# Patient Record
Sex: FEMALE | Race: BLACK OR AFRICAN AMERICAN | Employment: OTHER | URBAN - METROPOLITAN AREA
[De-identification: names, ages, dates, MRNs, and addresses within clinical notes are randomized per-mention and may not be internally consistent; named-entity substitution may affect disease eponyms.]

---

## 2017-01-05 ENCOUNTER — OFFICE VISIT (OUTPATIENT)
Dept: INTERNAL MEDICINE CLINIC | Age: 67
End: 2017-01-05

## 2017-01-05 VITALS
OXYGEN SATURATION: 99 % | TEMPERATURE: 97.7 F | DIASTOLIC BLOOD PRESSURE: 80 MMHG | HEART RATE: 80 BPM | BODY MASS INDEX: 26.01 KG/M2 | RESPIRATION RATE: 18 BRPM | WEIGHT: 175.6 LBS | HEIGHT: 69 IN | SYSTOLIC BLOOD PRESSURE: 132 MMHG

## 2017-01-05 DIAGNOSIS — R06.83 SNORING: ICD-10-CM

## 2017-01-05 DIAGNOSIS — D57.3 SICKLE CELL TRAIT (HCC): ICD-10-CM

## 2017-01-05 DIAGNOSIS — E55.9 VITAMIN D DEFICIENCY: ICD-10-CM

## 2017-01-05 DIAGNOSIS — E78.5 DYSLIPIDEMIA: Primary | ICD-10-CM

## 2017-01-05 NOTE — PROGRESS NOTES
Chief Complaint   Patient presents with    Cholesterol Problem    Vitamin D Deficiency       HPI:     Danay Palacios is a 78 y.o.  female with history of dyslipidemia and vitamin D deficiency  here for the above complaint. She denies any chest pain, abdominal pain, dizziness, shortness of breath. She has headaches, snores at night, but does not know if have apneic spells. Past Medical History   Diagnosis Date    Advance directive discussed with patient 09/06/2016    Dyslipidemia     H/O colonoscopy 08/12/14     Done by Dr. Gandhi Scales: diverticular dz & hemorrhoids, repeat in 10 years    Microcalcifications of the breast 2013     left breast s/p Vacuum-assisted core needle biopsy of the left breast with placement of localizing clip  6/18/2013. Being followed by  Gianni Bon Aqua. Suppose to have mammoram in 6 months. Path showed firbrocystic changes    Sickle cell trait ([facility] [address]     Vitamin D deficiency      Past Surgical History   Procedure Laterality Date    Hx tonsil and adenoidectomy  1962    Pr knee scope,med or lat menis repair  2006     right knee    Hx hysterectomy  1994     unsure of exact date [id number], partial hysterectomy. kept both ovaries    Harriet biopsy breast stereotactic Left 2013     benign    Hx breast biopsy Bilateral 1994     b[facility]     Current Outpatient Prescriptions   Medication Sig    atorvastatin (LIPITOR) 10 mg tablet TAKE 1 TABLET BY MOUTH DAILY    cholecalciferol, vitamin D3, (VITAMIN D3) 2,000 unit tab Take 2,000 Int'l Units by mouth daily.  omega-3 fatty acids-vitamin e (FISH OIL) 1,000 mg cap Take 2 Caps by mouth daily.  iron 50 mg iron Tab Take  by mouth daily. Take one po daily    acetaminophen (TYLENOL ARTHRITIS PAIN) 650 mg CR tablet Take 650 mg by mouth every six (6) hours as needed. No current facility-administered medications for this visit.       Health Maintenance   Topic Date Due    MEDICARE YEARLY EXAM 09/07/2017    GLAUCOMA SCREENING Q2Y  01/15/2018    Pneumococcal 65+ Low/Medium Risk (2 of 2 - PPSV23) 06/10/2018    BREAST CANCER SCRN MAMMOGRAM  07/25/2018    COLONOSCOPY  08/12/2024    DTaP/Tdap/Td series (2 - Td) 03/12/2026    Hepatitis C Screening  Completed    OSTEOPOROSIS SCREENING (DEXA)  Completed    ZOSTER VACCINE AGE 60>  Completed    INFLUENZA AGE 9 TO ADULT  Addressed     Immunization History   Administered Date(s) Administered    Influenza High Dose Vaccine PF 09/27/2015    Pneumococcal Conjugate (PCV-13) 04/25/2016    Pneumococcal Vaccine (Unspecified Type) 06/10/2013    Tdap 03/12/2016     No LMP recorded. Patient has had a hysterectomy. Allergies and Intolerances:   No Known Allergies    Family History:   Family History   Problem Relation Age of Onset    Hypertension Mother     Diabetes Mother     Hypertension Father     Hypertension Maternal Grandmother     Hypertension Maternal Grandfather        Social History:   She  reports that she has never smoked. She has never used smokeless tobacco.  She  reports that she drinks alcohol.               ROS:  · General: negative for - chills, fever, weight changes or malaise, night sweats  · HEENT: no sore throat, nasal congestion,  vision problems or ear problems  · Respiratory: no cough, shortness of breath, or wheezing  · Cardiovascular: no chest pain, palpitations, or dyspnea on exertion; no orthopnea or PND  · Gastrointestinal: no abdominal pain, N/V, change in bowel habits,  black or bloody stools, constipation or diarrhea  · Musculoskeletal: no back pain, joint pain, joint stiffness, muscle pain or muscle weakness  · Neurological: no numbness, tingling, headache or dizziness  · Psychological: negative for - anxiety, depression, sleep disturbances, suicidal or homicidal ideations    OBJECTIVE:   Physical exam:   Visit Vitals    /80 (BP 1 Location: Left arm, BP Patient Position: Sitting)    Pulse 80    Temp 97.7 °F (36.5 °C) (Oral)    Resp 18    Ht 5' 9\" (1.753 m)    Wt 175 lb 9.6 oz (79.7 kg)    SpO2 99%    BMI 25.93 kg/m2        Generally: Pleasant female in no acute distress  HEENT Exam: Head: atraumatic, acephalic                Eyes: PERRLA     Ears: bilaterally normal TM, no erythema or exudate, normal light reflex    Nares: mucosal membranes moist, no erythema    Mouth: Clear, no erythema or exudate    Neck: supple, no LAD    Cardiac Exam: regular, rate, and rhythm. Normal S1 and S2. No murmurs, gallops, or rubs  Pulmonary exam: Clear to auscultation bilaterally  Abdominal exam: Positive bowel sounds in all four quadrants, soft, nondistended, nontender  Extremities: 2+ dorsalis pedis pulses bilaterally. No pedal edema    bilaterally    LABS/RADIOLOGICAL TESTS:  Lab Results   Component Value Date/Time    WBC 5.3 06/17/2015 09:49 AM    HGB 13.3 06/17/2015 09:49 AM    HCT 39.6 06/17/2015 09:49 AM    PLATELET 572 12/85/1884 09:49 AM     Lab Results   Component Value Date/Time    Sodium 140 09/06/2016 10:06 AM    Potassium 3.9 09/06/2016 10:06 AM    Chloride 104 09/06/2016 10:06 AM    CO2 29 09/06/2016 10:06 AM    Glucose 80 09/06/2016 10:06 AM    BUN 16 09/06/2016 10:06 AM    Creatinine 0.62 09/06/2016 10:06 AM     Lab Results   Component Value Date/Time    Cholesterol, total 238 09/06/2016 10:06 AM    HDL Cholesterol 133 09/06/2016 10:06 AM    LDL, calculated 85.6 09/06/2016 10:06 AM    Triglyceride 97 09/06/2016 10:06 AM     No results found for: GPT    Previous labs    ASSESSMENT/PLAN:    1. Dyslipidemia: we will see what the labs show. Continue diet, exercise, and lipitor.   -     METABOLIC PANEL, COMPREHENSIVE; Future  -     LIPID PANEL; Future    2. Sickle cell trait ([facility] [address]): stable. -     CBC W/O DIFF; Future    3. Vitamin D deficiency: stable. Continue the vitamin D.   -     VITAMIN D, 25 HYDROXY; Future    4. Snoring  -     REFERRAL TO SLEEP STUDIES      5.  Patient verbalized understanding and agreement with the plan.    6. Patient was given an after-visit summary. 7. Follow-up Disposition:  Return in about 4 months (around 5/5/2017) for f/u lipids. or sooner if worsening symptoms.           Christine Calvo MD

## 2017-01-05 NOTE — PROGRESS NOTES
ROOM # 1    Pt presents today for 3 month follow up. Pt preferred language for health care discussion is english. Is someone accompanying this pt? no    Is the patient using any DME equipment during OV? no    Depression Screening completed. Yes    Learning Assessment completed. Yes    Abuse Screening completed. Yes    Health Maintenance reviewed and discussed per provider. Yes, up to date    Advance Directive:  1. Do you have an advance directive in place? Patient Reply: No    2. If not, would you like material regarding how to put one in place? Patient Reply: No    Coordination of Care:  1. Have you been to the ER, urgent care clinic since your last visit? Hospitalized since your last visit? No    2. Have you seen or consulted any other health care providers outside of the [address] [place] [address] since your last visit? Include any pap smears or colon screening.  No

## 2017-01-05 NOTE — MR AVS SNAPSHOT
Visit Information Date & Time Provider Department Dept. Phone Encounter #  
 1/5/2017  9:30 AM Wolf Gordon MD [phone] [id number] Follow-up Instructions Return in about 4 months (around 5/5/2017) for f/u lipids. Upcoming Health Maintenance Date Due  
 MEDICARE YEARLY EXAM 9/7/2017 GLAUCOMA SCREENING Q2Y 1/15/2018 Pneumococcal 65+ Low/Medium Risk (2 of 2 - PPSV23) 6/10/2018 BREAST CANCER SCRN MAMMOGRAM 7/25/2018 COLONOSCOPY 8/12/2024 DTaP/Tdap/Td series (2 - Td) 3/12/2026 Allergies as of 1/5/2017  Review Complete On: 1/5/2017 By: Wolf Gordon MD  
 No Known Allergies Current Immunizations  Reviewed on 9/6/2016 Name Date Influenza High Dose Vaccine PF 9/27/2015 Pneumococcal Conjugate (PCV-13) 4/25/2016 Pneumococcal Vaccine (Unspecified Type) 6/10/2013 Tdap 3/12/2016 11:04 AM  
  
 Not reviewed this visit You Were Diagnosed With   
  
 Codes Comments Dyslipidemia    -  Primary ICD-10-CM: E78.5 ICD-9-CM: 272.4 [facility]     ICD-10-CM: D57.3 ICD-9-CM: 282.5 Vitamin D deficiency     ICD-10-CM: E55.9 ICD-9-CM: 268.9 Vitals BP Pulse Temp Resp Height(growth percentile) Weight(growth percentile) 132/80 (BP 1 Location: Left arm, BP Patient Position: Sitting) 80 97.7 °F (36.5 °C) (Oral) 18 5' 9\" (1.753 m) 175 lb 9.6 oz (79.7 kg) SpO2 BMI OB Status Smoking Status 99% 25.93 kg/m2 Hysterectomy Never Smoker Vitals History BMI and BSA Data Body Mass Index Body Surface Area  
 25.93 kg/m 2 1.97 m 2 Preferred Pharmacy Pharmacy Name Phone [facility] [address] [place] [address] [facility] Susan Perdomo Your Updated Medication List  
  
   
This list is accurate as of: 1/5/17 10:04 AM.  Always use your most recent med list.  
  
  
  
  
 atorvastatin 10 mg tablet Commonly known as:  LIPITOR  
TAKE 1 TABLET BY MOUTH DAILY FISH OIL 1,000 mg Cap Generic drug:  omega-3 fatty acids-vitamin e Take 2 Caps by mouth daily. iron 50 mg iron Tab Take  by mouth daily. Take one po daily TYLENOL ARTHRITIS PAIN 650 mg CR tablet Generic drug:  acetaminophen Take 650 mg by mouth every six (6) hours as needed. VITAMIN D3 2,000 unit Tab Generic drug:  cholecalciferol (vitamin D3) Take 2,000 Int'l Units by mouth daily. Follow-up Instructions Return in about 4 months (around 5/5/2017) for f/u lipids. To-Do List   
 01/05/2017 Lab:  CBC W/O DIFF   
  
 01/05/2017 Lab:  LIPID PANEL   
  
 01/05/2017 Lab:  METABOLIC PANEL, COMPREHENSIVE   
  
 01/05/2017 Lab:  VITAMIN D, 25 HYDROXY Patient Instructions 1) follow-up in 4 months or sooner if worsening symptoms. Introducing [facility]! Marquez Martini introduces [employer] patient portal. Now you can access parts of your medical record, email your doctor's office, and request medication refills online. 1. In your internet browser, go to [email or web address] 2. Click on the First Time User? Click Here link in the Sign In box. You will see the New Member Sign Up page. 3. Enter your EUDOWEB Access Code exactly as it appears below. You will not need to use this code after youve completed the sign-up process. If you do not sign up before the expiration date, you must request a new code. · E[employer] Access Code: [email or web address] Expires: 4/5/2017 10:03 AM 
 
4. Enter the last four digits of your Social Security Number (xxxx) and Date of Birth (mm/dd/yyyy) as indicated and click Submit. You will be taken to the next sign-up page. 5. Create a EUDOWEB ID. This will be your EUDOWEB login ID and cannot be changed, so think of one that is secure and easy to remember. 6. Create a EUDOWEB password. You can change your password at any time. 7. Enter your Password Reset Question and Answer. This can be used at a later time if you forget your password. 8. Enter your e-mail address. You will receive e-mail notification when new information is available in [address] 9. Click Sign Up. You can now view and download portions of your medical record. 10. Click the Download Summary menu link to download a portable copy of your medical information. If you have questions, please visit the Frequently Asked Questions section of the [employer] website. Remember, [employer] is NOT to be used for urgent needs. For medical emergencies, dial 911. Now available from your iPhone and Android! Please provide this summary of care documentation to your next provider. Your primary care clinician is listed as Lolly Bruno. If you have any questions after today's visit, please call [phone].

## 2017-01-06 ENCOUNTER — HOSPITAL ENCOUNTER (OUTPATIENT)
Dept: LAB | Age: 67
Discharge: HOME | End: 2017-01-06
Payer: MEDICARE

## 2017-01-06 LAB
ALBUMIN SERPL BCP-MCNC: 3.9 G/DL (ref 3.4–5)
ALBUMIN/GLOB SERPL: 1.4 {RATIO} (ref 0.8–1.7)
ALP SERPL-CCNC: 108 U/L (ref 45–117)
ALT SERPL-CCNC: 29 U/L (ref 13–56)
ANION GAP BLD CALC-SCNC: 6 MMOL/L (ref 3–18)
AST SERPL W P-5'-P-CCNC: 19 U/L (ref 15–37)
BILIRUB SERPL-MCNC: 0.7 MG/DL (ref 0.2–1)
BUN SERPL-MCNC: 13 MG/DL (ref 7–18)
BUN/CREAT SERPL: 25 (ref 12–20)
CALCIUM SERPL-MCNC: 8.8 MG/DL (ref 8.5–10.1)
CHLORIDE SERPL-SCNC: 105 MMOL/L (ref 100–108)
CHOLEST SERPL-MCNC: 209 MG/DL
CO2 SERPL-SCNC: 30 MMOL/L (ref 21–32)
CREAT SERPL-MCNC: 0.53 MG/DL (ref 0.6–1.3)
ERYTHROCYTE [DISTWIDTH] IN BLOOD BY AUTOMATED COUNT: 14.3 % (ref 11.6–14.5)
GLOBULIN SER CALC-MCNC: 2.8 G/DL (ref 2–4)
GLUCOSE SERPL-MCNC: 70 MG/DL (ref 74–99)
HCT VFR BLD AUTO: 37.2 % (ref 35–45)
HDLC SERPL-MCNC: 119 MG/DL (ref 40–60)
HDLC SERPL: 1.8 {RATIO} (ref 0–5)
HGB BLD-MCNC: 12.7 G/DL (ref 12–16)
LDLC SERPL CALC-MCNC: 81.6 MG/DL (ref 0–100)
LIPID PROFILE,FLP: ABNORMAL
MCH RBC QN AUTO: 30 PG (ref 24–34)
MCHC RBC AUTO-ENTMCNC: 34.1 G/DL (ref 31–37)
MCV RBC AUTO: 87.9 FL (ref 74–97)
PLATELET # BLD AUTO: 280 K/UL (ref 135–420)
PMV BLD AUTO: 10.6 FL (ref 9.2–11.8)
POTASSIUM SERPL-SCNC: 3.7 MMOL/L (ref 3.5–5.5)
PROT SERPL-MCNC: 6.7 G/DL (ref 6.4–8.2)
RBC # BLD AUTO: 4.23 M/UL (ref 4.2–5.3)
SODIUM SERPL-SCNC: 141 MMOL/L (ref 136–145)
TRIGL SERPL-MCNC: 42 MG/DL (ref ?–150)
VLDLC SERPL CALC-MCNC: 8.4 MG/DL
WBC # BLD AUTO: 5.3 K/UL (ref 4.6–13.2)

## 2017-01-06 PROCEDURE — 82306 VITAMIN D 25 HYDROXY: CPT | Performed by: INTERNAL MEDICINE

## 2017-01-06 PROCEDURE — 80053 COMPREHEN METABOLIC PANEL: CPT | Performed by: INTERNAL MEDICINE

## 2017-01-06 PROCEDURE — 36415 COLL VENOUS BLD VENIPUNCTURE: CPT | Performed by: INTERNAL MEDICINE

## 2017-01-06 PROCEDURE — 80061 LIPID PANEL: CPT | Performed by: INTERNAL MEDICINE

## 2017-01-06 PROCEDURE — 85027 COMPLETE CBC AUTOMATED: CPT | Performed by: INTERNAL MEDICINE

## 2017-01-09 ENCOUNTER — TELEPHONE (OUTPATIENT)
Dept: INTERNAL MEDICINE CLINIC | Age: 67
End: 2017-01-09

## 2017-01-09 NOTE — TELEPHONE ENCOUNTER
Contact patient, informed patient of result note below, patient verbalized understanding and agrees with plan. Patient states that she try not to miss any doses of Lipitor,  patient states that every now and then she misses a dose, patient states that with in the past week she has only missed one dose of Lipitor.

## 2017-01-09 NOTE — TELEPHONE ENCOUNTER
----- Message from Jessie Aguillon MD sent at 1/9/2017 11:59 AM EST -----  Please let pt know that labs were normal except:    1) lipids still little up. Has she missed any dosages of her lipitor? 2) work on diet and exercise. 3) vitamin D pending. Will notify her of results.

## 2017-01-09 NOTE — PROGRESS NOTES
Please let pt know that labs were normal except:    1) lipids still little up. Has she missed any dosages of her lipitor? 2) work on diet and exercise. 3) vitamin D pending. Will notify her of results.

## 2017-01-11 LAB — 25(OH)D3 SERPL-MCNC: 35.7 NG/ML (ref 30–100)

## 2017-01-11 NOTE — TELEPHONE ENCOUNTER
Pt contacted at home number. 2 pt identifiers confirmed. Pt informed of below. Pt verbalized understanding. No other questions at this time.

## 2017-01-11 NOTE — PROGRESS NOTES
Result letter generated for the vitamin D results. She has also been notified of her other results through phone call. Letter will be mailed to pt.

## 2017-03-02 ENCOUNTER — TELEPHONE (OUTPATIENT)
Dept: SLEEP MEDICINE | Age: 67
End: 2017-03-02

## 2017-03-02 ENCOUNTER — HOSPITAL ENCOUNTER (OUTPATIENT)
Dept: SLEEP MEDICINE | Age: 67
Discharge: HOME | End: 2017-03-02
Payer: MEDICARE

## 2017-03-02 DIAGNOSIS — G47.00 INSOMNIA: ICD-10-CM

## 2017-03-02 DIAGNOSIS — G47.33 OSA (OBSTRUCTIVE SLEEP APNEA): ICD-10-CM

## 2017-03-02 DIAGNOSIS — R06.83 SNORING: ICD-10-CM

## 2017-03-02 PROCEDURE — 95810 POLYSOM 6/> YRS 4/> PARAM: CPT

## 2017-03-03 ENCOUNTER — DOCUMENTATION ONLY (OUTPATIENT)
Dept: SLEEP MEDICINE | Age: 67
End: 2017-03-03

## 2017-03-14 DIAGNOSIS — E78.5 DYSLIPIDEMIA: ICD-10-CM

## 2017-03-14 RX ORDER — ATORVASTATIN CALCIUM 10 MG/1
TABLET, FILM COATED ORAL
Qty: 90 TAB | Refills: 3 | Status: SHIPPED | OUTPATIENT
Start: 2017-03-14 | End: 2018-03-10 | Stop reason: SDUPTHER

## 2017-03-14 NOTE — TELEPHONE ENCOUNTER
Requested Prescriptions     Pending Prescriptions Disp Refills    atorvastatin (LIPITOR) 10 mg tablet 90 Tab 3     Sig: TAKE 1 TABLET BY MOUTH DAILY

## 2017-03-15 PROBLEM — G47.33 OSA (OBSTRUCTIVE SLEEP APNEA): Status: ACTIVE | Noted: 2017-03-02

## 2017-03-16 ENCOUNTER — HOSPITAL ENCOUNTER (OUTPATIENT)
Dept: SLEEP MEDICINE | Age: 67
Discharge: HOME | End: 2017-03-16
Payer: MEDICARE

## 2017-03-16 ENCOUNTER — TELEPHONE (OUTPATIENT)
Dept: SLEEP MEDICINE | Age: 67
End: 2017-03-16

## 2017-03-16 DIAGNOSIS — G47.33 OSA (OBSTRUCTIVE SLEEP APNEA): ICD-10-CM

## 2017-03-16 PROCEDURE — 95811 POLYSOM 6/>YRS CPAP 4/> PARM: CPT

## 2017-03-17 ENCOUNTER — DOCUMENTATION ONLY (OUTPATIENT)
Dept: SLEEP MEDICINE | Age: 67
End: 2017-03-17

## 2017-03-17 NOTE — PROGRESS NOTES
Sleep study scored per the American Academy of Sleep Medicine (AASM) Manual for the Scoring of Sleep and Associated Events, Rules, Terminology and Technical Specifications Visual and Cardiac Rules, 2007. V2.2    Preliminary titration sleep study report scanned in to patient's chart.

## 2017-05-09 ENCOUNTER — OFFICE VISIT (OUTPATIENT)
Dept: INTERNAL MEDICINE CLINIC | Age: 67
End: 2017-05-09

## 2017-05-09 VITALS
SYSTOLIC BLOOD PRESSURE: 132 MMHG | BODY MASS INDEX: 25.45 KG/M2 | HEART RATE: 68 BPM | HEIGHT: 69 IN | DIASTOLIC BLOOD PRESSURE: 82 MMHG | WEIGHT: 171.8 LBS | OXYGEN SATURATION: 100 % | RESPIRATION RATE: 16 BRPM | TEMPERATURE: 97.7 F

## 2017-05-09 DIAGNOSIS — E78.5 DYSLIPIDEMIA: Primary | ICD-10-CM

## 2017-05-09 DIAGNOSIS — E55.9 VITAMIN D DEFICIENCY: ICD-10-CM

## 2017-05-09 NOTE — MR AVS SNAPSHOT
Visit Information Date & Time Provider Department Dept. Phone Encounter #  
 5/9/2017  9:30 AM Juan F Padilla MD [address] [phone] [id number] Follow-up Instructions Return in about 4 months (around 9/9/2017) for f/u lipids, medicare wellness subsequent exam. Upcoming Health Maintenance Date Due INFLUENZA AGE 9 TO ADULT 8/1/2017 MEDICARE YEARLY EXAM 9/7/2017 GLAUCOMA SCREENING Q2Y 1/15/2018 Pneumococcal 65+ Low/Medium Risk (2 of 2 - PPSV23) 6/10/2018 BREAST CANCER SCRN MAMMOGRAM 7/25/2018 COLONOSCOPY 8/12/2024 DTaP/Tdap/Td series (2 - Td) 3/12/2026 Allergies as of 5/9/2017  Review Complete On: 5/9/2017 By: Juan F Padilla MD  
 No Known Allergies Current Immunizations  Reviewed on 9/6/2016 Name Date Influenza High Dose Vaccine PF 9/27/2015 Pneumococcal Conjugate (PCV-13) 4/25/2016 Pneumococcal Vaccine (Unspecified Type) 6/10/2013 Tdap 3/12/2016 11:04 AM  
  
 Not reviewed this visit You Were Diagnosed With   
  
 Codes Comments Dyslipidemia    -  Primary ICD-10-CM: E78.5 ICD-9-CM: 272.4 Vitamin D deficiency     ICD-10-CM: E55.9 ICD-9-CM: 268.9 Vitals BP Pulse Temp Resp Height(growth percentile) Weight(growth percentile) 132/82 (BP 1 Location: Right arm, BP Patient Position: Sitting) 68 97.7 °F (36.5 °C) (Oral) 16 5' 9\" (1.753 m) 171 lb 12.8 oz (77.9 kg) SpO2 BMI OB Status Smoking Status 100% 25.37 kg/m2 Hysterectomy Never Smoker Vitals History BMI and BSA Data Body Mass Index Body Surface Area  
 25.37 kg/m 2 1.95 m 2 Preferred Pharmacy Pharmacy Name Phone [facility] [address] [place] [address] [facility] Susan Perdomo Your Updated Medication List  
  
   
This list is accurate as of: 5/9/17  9:53 AM.  Always use your most recent med list.  
  
  
  
  
 atorvastatin 10 mg tablet Commonly known as:  LIPITOR  
TAKE 1 TABLET BY MOUTH DAILY FISH OIL 1,000 mg Cap Generic drug:  omega-3 fatty acids-vitamin e Take 2 Caps by mouth daily. iron 50 mg iron Tab Take  by mouth daily. Take one po daily TYLENOL ARTHRITIS PAIN 650 mg CR tablet Generic drug:  acetaminophen Take 650 mg by mouth every six (6) hours as needed. VITAMIN D3 2,000 unit Tab Generic drug:  cholecalciferol (vitamin D3) Take 2,000 Int'l Units by mouth daily. Follow-up Instructions Return in about 4 months (around 9/9/2017) for f/u lipids, medicare wellness subsequent exam. To-Do List   
 05/09/2017 Lab:  LIPID PANEL   
  
 05/09/2017 Lab:  METABOLIC PANEL, COMPREHENSIVE   
  
 05/09/2017 Lab:  VITAMIN D, 25 HYDROXY Patient Instructions 1) follow-up in 4 months or sooner if worsening symptoms. Introducing \[facility]\"" & HEALTH SERVICES! Verona Torres introduces [employer] patient portal. Now you can access parts of your medical record, email your doctor's office, and request medication refills online. 1. In your internet browser, go to [email or web address] 2. Click on the First Time User? Click Here link in the Sign In box. You will see the New Member Sign Up page. 3. Enter your [employer] Access Code exactly as it appears below. You will not need to use this code after youve completed the sign-up process. If you do not sign up before the expiration date, you must request a new code. · [employer] Access Code: [email or web address] Expires: 8/7/2017  9:53 AM 
 
4. Enter the last four digits of your Social Security Number (xxxx) and Date of Birth (mm/dd/yyyy) as indicated and click Submit. You will be taken to the next sign-up page. 5. Create a [employer] ID. This will be your [employer] login ID and cannot be changed, so think of one that is secure and easy to remember. 6. Create a [employer] password. You can change your password at any time. 7. Enter your Password Reset Question and Answer. This can be used at a later time if you forget your password. 8. Enter your e-mail address. You will receive e-mail notification when new information is available in [address] 9. Click Sign Up. You can now view and download portions of your medical record. 10. Click the Download Summary menu link to download a portable copy of your medical information. If you have questions, please visit the Frequently Asked Questions section of the [employer] website. Remember, [employer] is NOT to be used for urgent needs. For medical emergencies, dial 911. Now available from your iPhone and Android! Please provide this summary of care documentation to your next provider. Your primary care clinician is listed as Lolly Bruno. If you have any questions after today's visit, please call [phone].

## 2017-05-09 NOTE — PROGRESS NOTES
ROOM # 1    Nova Crews presents today for   Chief Complaint   Patient presents with    Cholesterol Problem       Nova Crews preferred language for health care discussion is english/other. Is someone accompanying this pt? no    Is the patient using any DME equipment during OV? no    Depression Screening:  PHQ over the last two weeks 5/9/2017 5/9/2017 1/5/2017 9/6/2016 4/25/2016 3/9/2015 3/24/2014   Little interest or pleasure in doing things Not at all Not at all Not at all Not at all Not at all Not at all Not at all   Feeling down, depressed or hopeless Not at all - Not at all Not at all Not at all Not at all Not at all   Total Score PHQ 2 0 - 0 0 0 0 0       Learning Assessment:  Learning Assessment 3/9/2015 1/28/2014   PRIMARY LEARNER Patient Patient   HIGHEST LEVEL OF EDUCATION - PRIMARY LEARNER  > 4 [address]   LEARNER PREFERENCE PRIMARY DEMONSTRATION READING   ANSWERED BY Patient pt   RELATIONSHIP SELF SELF       Abuse Screening:  Abuse Screening Questionnaire 5/9/2017 11/25/2015   Do you ever feel afraid of your partner? N N   Are you in a relationship with someone who physically or mentally threatens you? N N   Is it safe for you to go home? Y Y       Fall Risk  Fall Risk Assessment, last 12 mths 5/9/2017 1/5/2017 9/6/2016 4/25/2016 3/9/2015 3/24/2014   Able to walk? Yes Yes Yes Yes Yes Yes   Fall in past 12 months? Yes No No No Yes No   Fall with injury? No - - - No -   Number of falls in past 12 months 1 - - - 2 -   Fall Risk Score 1 - - - 2 -       Health Maintenance reviewed and discussed per provider. Yes    Advance Directive:  1. Do you have an advance directive in place? Patient Reply: no    2. If not, would you like material regarding how to put one in place? Patient Reply: no    Coordination of Care:  1. Have you been to the ER, urgent care clinic since your last visit? Hospitalized since your last visit? no    2.  Have you seen or consulted any other health care providers outside of the [address] [place] [address] since your last visit? Include any pap smears or colon screening.  Neurology for CPAP

## 2017-05-09 NOTE — PROGRESS NOTES
Chief Complaint   Patient presents with    Cholesterol Problem       HPI:     Juan Antonio Delatorre is a 75 y.o.  female with history of dyslipidemia and PAOLA   here for the above complaint. She denies any chest pain, shortness of breath, abdominal pain, headaches or dizziness. She has her CPAP machine for the PAOLA. Past Medical History:   Diagnosis Date    Advance directive discussed with patient 09/06/2016    Dyslipidemia     H/O colonoscopy 08/12/14    Done by Dr. Elaine Rivers: diverticular dz & hemorrhoids, repeat in 10 years    Microcalcifications of the breast 2013    left breast s/p Vacuum-assisted core needle biopsy of the left breast with placement of localizing clip  6/18/2013. Being followed by Dr. Silvana Rea. Suppose to have mammoram in 6 months. Path showed firbrocystic changes    PAOLA (obstructive sleep apnea) 03/02/2017    on CPAP machine    Sickle cell trait ([address]     Vitamin D deficiency      Past Surgical History:   Procedure Laterality Date    HX BREAST BIOPSY Bilateral 1994    bilateral biopsy-[facility]    HX HYSTERECTOMY  1994    unsure of exact date [id number], partial hysterectomy. kept both ovaries    HX TONSIL AND ADENOIDECTOMY  1962    KNEE SCOPE,MED [facility]  2006    right knee    ANIBAL BIOPSY BREAST STEREOTACTIC Left 2013    benign     Current Outpatient Prescriptions   Medication Sig    atorvastatin (LIPITOR) 10 mg tablet TAKE 1 TABLET BY MOUTH DAILY    cholecalciferol, vitamin D3, (VITAMIN D3) 2,000 unit tab Take 2,000 Int'l Units by mouth daily.  omega-3 fatty acids-vitamin e (FISH OIL) 1,000 mg cap Take 2 Caps by mouth daily.  iron 50 mg iron Tab Take  by mouth daily. Take one po daily    acetaminophen (TYLENOL ARTHRITIS PAIN) 650 mg CR tablet Take 650 mg by mouth every six (6) hours as needed. No current facility-administered medications for this visit.       Health Maintenance   Topic Date Due    INFLUENZA AGE 9 TO ADULT 08/01/2017    MEDICARE YEARLY EXAM  09/07/2017    GLAUCOMA SCREENING Q2Y  01/15/2018    Pneumococcal 65+ Low/Medium Risk (2 of 2 - PPSV23) 06/10/2018    BREAST CANCER SCRN MAMMOGRAM  07/25/2018    COLONOSCOPY  08/12/2024    DTaP/Tdap/Td series (2 - Td) 03/12/2026    Hepatitis C Screening  Completed    OSTEOPOROSIS SCREENING (DEXA)  Completed    ZOSTER VACCINE AGE 60>  Completed     Immunization History   Administered Date(s) Administered    Influenza High Dose Vaccine PF 09/27/2015    Pneumococcal Conjugate (PCV-13) 04/25/2016    Pneumococcal Vaccine (Unspecified Type) 06/10/2013    Tdap 03/12/2016     No LMP recorded. Patient has had a hysterectomy. Allergies and Intolerances:   No Known Allergies    Family History:   Family History   Problem Relation Age of Onset    Hypertension Mother     Diabetes Mother     Hypertension Father     Hypertension Maternal Grandmother     Hypertension Maternal Grandfather        Social History:   She  reports that she has never smoked. She has never used smokeless tobacco.  She  reports that she drinks alcohol. ·     OBJECTIVE:   Physical exam:   Visit Vitals    /82 (BP 1 Location: Right arm, BP Patient Position: Sitting)    Pulse 68    Temp 97.7 °F (36.5 °C) (Oral)    Resp 16    Ht 5' 9\" (1.753 m)    Wt 171 lb 12.8 oz (77.9 kg)    SpO2 100%    BMI 25.37 kg/m2        Generally: Pleasant female in no acute distress  Cardiac Exam: regular, rate, and rhythm. Normal S1 and S2. No murmurs, gallops, or rubs  Pulmonary exam: Clear to auscultation bilaterally  Abdominal exam: Positive bowel sounds in all four quadrants, soft, nondistended, nontender  Extremities: 2+ dorsalis pedis pulses bilaterally.  No pedal edema    bilaterally    LABS/RADIOLOGICAL TESTS:  Lab Results   Component Value Date/Time    WBC 5.3 01/06/2017 11:23 AM    HGB 12.7 01/06/2017 11:23 AM    HCT 37.2 01/06/2017 11:23 AM    PLATELET 472 13/58/2864 11:23 AM     Lab Results   Component Value Date/Time    Sodium 141 01/06/2017 11:23 AM    Potassium 3.7 01/06/2017 11:23 AM    Chloride 105 01/06/2017 11:23 AM    CO2 30 01/06/2017 11:23 AM    Glucose 70 01/06/2017 11:23 AM    BUN 13 01/06/2017 11:23 AM    Creatinine 0.53 01/06/2017 11:23 AM     Lab Results   Component Value Date/Time    Cholesterol, total 209 01/06/2017 11:23 AM    HDL Cholesterol 119 01/06/2017 11:23 AM    LDL, calculated 81.6 01/06/2017 11:23 AM    Triglyceride 42 01/06/2017 11:23 AM     No results found for: GPT    Previous labs    ASSESSMENT/PLAN:    1. Dyslipidemia: we will see what the labs show. Continue diet, exercise and lipitor. She wondered if she can take red yeast rice in addition to the lipitor. Told she should not as it is a natural version of lovastatin. She said she took 2 this AM. Told her to not take anymore. -     LIPID PANEL; Future  -     METABOLIC PANEL, COMPREHENSIVE; Future    2. Vitamin D deficiency: stable. Continue the vitamin D.   -     VITAMIN D, 25 HYDROXY; Future      3. Patient verbalized understanding and agreement with the plan. 4. Patient was given an after-visit summary. 5.   Follow-up Disposition:  Return in about 4 months (around 9/9/2017) for f/u lipids, medicare wellness subsequent exam. or sooner if worsening symptoms.           Noah Spring MD

## 2017-05-10 ENCOUNTER — TELEPHONE (OUTPATIENT)
Dept: INTERNAL MEDICINE CLINIC | Age: 67
End: 2017-05-10

## 2017-05-10 NOTE — TELEPHONE ENCOUNTER
I cannot write letter for her to get a wheelchair. Pt does not have any medical condition to warrant the wheelchair or this type of letter.

## 2017-05-10 NOTE — TELEPHONE ENCOUNTER
2 pt. Identifiers confirmed. Pt. Notified of below. Suggested any pharmacy to by out of pocket DME if she truly feels she may require a wheelchair. No other questions/concerns at this time.

## 2017-05-10 NOTE — TELEPHONE ENCOUNTER
Patient requesting Kaya Mcclellan note for permission to have a wheelchair and early boarding pass for [employer] flight from [place] traveling to [facility] and [facility]. Patient further requests statement to require aisle seat on left side of airframe. Patient requests 2nd note for return flight.

## 2017-05-11 ENCOUNTER — LAB ONLY (OUTPATIENT)
Dept: INTERNAL MEDICINE CLINIC | Age: 67
End: 2017-05-11

## 2017-05-11 ENCOUNTER — HOSPITAL ENCOUNTER (OUTPATIENT)
Dept: LAB | Age: 67
Discharge: HOME | End: 2017-05-11
Payer: MEDICARE

## 2017-05-11 DIAGNOSIS — E78.5 DYSLIPIDEMIA: ICD-10-CM

## 2017-05-11 DIAGNOSIS — E55.9 VITAMIN D DEFICIENCY: ICD-10-CM

## 2017-05-11 LAB
ALBUMIN SERPL BCP-MCNC: 4.4 G/DL (ref 3.4–5)
ALBUMIN/GLOB SERPL: 1.3 {RATIO} (ref 0.8–1.7)
ALP SERPL-CCNC: 121 U/L (ref 45–117)
ALT SERPL-CCNC: 29 U/L (ref 13–56)
ANION GAP BLD CALC-SCNC: 9 MMOL/L (ref 3–18)
AST SERPL W P-5'-P-CCNC: 22 U/L (ref 15–37)
BILIRUB SERPL-MCNC: 0.7 MG/DL (ref 0.2–1)
BUN SERPL-MCNC: 12 MG/DL (ref 7–18)
BUN/CREAT SERPL: 21 (ref 12–20)
CALCIUM SERPL-MCNC: 9.3 MG/DL (ref 8.5–10.1)
CHLORIDE SERPL-SCNC: 103 MMOL/L (ref 100–108)
CHOLEST SERPL-MCNC: 228 MG/DL
CO2 SERPL-SCNC: 29 MMOL/L (ref 21–32)
CREAT SERPL-MCNC: 0.58 MG/DL (ref 0.6–1.3)
GLOBULIN SER CALC-MCNC: 3.3 G/DL (ref 2–4)
GLUCOSE SERPL-MCNC: 76 MG/DL (ref 74–99)
HDLC SERPL-MCNC: 114 MG/DL (ref 40–60)
HDLC SERPL: 2 {RATIO} (ref 0–5)
LDLC SERPL CALC-MCNC: 102 MG/DL (ref 0–100)
LIPID PROFILE,FLP: ABNORMAL
POTASSIUM SERPL-SCNC: 3.7 MMOL/L (ref 3.5–5.5)
PROT SERPL-MCNC: 7.7 G/DL (ref 6.4–8.2)
SODIUM SERPL-SCNC: 141 MMOL/L (ref 136–145)
TRIGL SERPL-MCNC: 60 MG/DL (ref ?–150)
VLDLC SERPL CALC-MCNC: 12 MG/DL

## 2017-05-11 PROCEDURE — 36415 COLL VENOUS BLD VENIPUNCTURE: CPT | Performed by: INTERNAL MEDICINE

## 2017-05-11 PROCEDURE — 80053 COMPREHEN METABOLIC PANEL: CPT | Performed by: INTERNAL MEDICINE

## 2017-05-11 PROCEDURE — 80061 LIPID PANEL: CPT | Performed by: INTERNAL MEDICINE

## 2017-05-11 PROCEDURE — 82306 VITAMIN D 25 HYDROXY: CPT | Performed by: INTERNAL MEDICINE

## 2017-05-12 LAB — 25(OH)D3 SERPL-MCNC: 37.8 NG/ML (ref 30–100)

## 2017-05-15 ENCOUNTER — TELEPHONE (OUTPATIENT)
Dept: INTERNAL MEDICINE CLINIC | Age: 67
End: 2017-05-15

## 2017-05-15 NOTE — TELEPHONE ENCOUNTER
----- Message from José Miguel Dean MD sent at 5/12/2017  2:51 PM EDT -----  Please let pt know that labs were normal except:    1) Alk phos little up. Is she having an RUQ pain, n/v, yellowing of eyes or skin, n/v?    2) lipids are okay. LDL little up at 102 and needs to 100 or less. Continue to work on diet and exercise.

## 2017-07-30 DIAGNOSIS — Z12.31 ENCOUNTER FOR SCREENING MAMMOGRAM FOR BREAST CANCER: Primary | ICD-10-CM

## 2017-08-10 DIAGNOSIS — Z12.31 ENCOUNTER FOR SCREENING MAMMOGRAM FOR BREAST CANCER: ICD-10-CM

## 2017-09-11 ENCOUNTER — OFFICE VISIT (OUTPATIENT)
Dept: INTERNAL MEDICINE CLINIC | Age: 67
End: 2017-09-11

## 2017-09-11 ENCOUNTER — HOSPITAL ENCOUNTER (OUTPATIENT)
Dept: LAB | Age: 67
Discharge: HOME | End: 2017-09-11
Payer: MEDICARE

## 2017-09-11 VITALS
RESPIRATION RATE: 16 BRPM | TEMPERATURE: 97.9 F | SYSTOLIC BLOOD PRESSURE: 160 MMHG | HEART RATE: 71 BPM | OXYGEN SATURATION: 100 % | BODY MASS INDEX: 26.33 KG/M2 | HEIGHT: 69 IN | WEIGHT: 177.8 LBS | DIASTOLIC BLOOD PRESSURE: 98 MMHG

## 2017-09-11 DIAGNOSIS — E78.5 DYSLIPIDEMIA: ICD-10-CM

## 2017-09-11 DIAGNOSIS — Z23 ENCOUNTER FOR IMMUNIZATION: ICD-10-CM

## 2017-09-11 DIAGNOSIS — E55.9 VITAMIN D DEFICIENCY: ICD-10-CM

## 2017-09-11 DIAGNOSIS — R03.0 ELEVATED BLOOD PRESSURE READING: ICD-10-CM

## 2017-09-11 DIAGNOSIS — Z00.00 ENCOUNTER FOR MEDICARE ANNUAL WELLNESS EXAM: Primary | ICD-10-CM

## 2017-09-11 LAB
25(OH)D3 SERPL-MCNC: 33.7 NG/ML (ref 30–100)
ALBUMIN SERPL-MCNC: 4.1 G/DL (ref 3.4–5)
ALBUMIN/GLOB SERPL: 1.4 {RATIO} (ref 0.8–1.7)
ALP SERPL-CCNC: 103 U/L (ref 45–117)
ALT SERPL-CCNC: 31 U/L (ref 13–56)
ANION GAP SERPL CALC-SCNC: 8 MMOL/L (ref 3–18)
AST SERPL-CCNC: 23 U/L (ref 15–37)
BILIRUB SERPL-MCNC: 0.7 MG/DL (ref 0.2–1)
BUN SERPL-MCNC: 14 MG/DL (ref 7–18)
BUN/CREAT SERPL: 24 (ref 12–20)
CALCIUM SERPL-MCNC: 9.1 MG/DL (ref 8.5–10.1)
CHLORIDE SERPL-SCNC: 103 MMOL/L (ref 100–108)
CHOLEST SERPL-MCNC: 240 MG/DL
CO2 SERPL-SCNC: 29 MMOL/L (ref 21–32)
CREAT SERPL-MCNC: 0.58 MG/DL (ref 0.6–1.3)
GLOBULIN SER CALC-MCNC: 2.9 G/DL (ref 2–4)
GLUCOSE SERPL-MCNC: 76 MG/DL (ref 74–99)
HDLC SERPL-MCNC: 134 MG/DL (ref 40–60)
HDLC SERPL: 1.8 {RATIO} (ref 0–5)
LDLC SERPL CALC-MCNC: 94.8 MG/DL (ref 0–100)
LIPID PROFILE,FLP: ABNORMAL
POTASSIUM SERPL-SCNC: 3.8 MMOL/L (ref 3.5–5.5)
PROT SERPL-MCNC: 7 G/DL (ref 6.4–8.2)
SODIUM SERPL-SCNC: 140 MMOL/L (ref 136–145)
TRIGL SERPL-MCNC: 56 MG/DL (ref ?–150)
VLDLC SERPL CALC-MCNC: 11.2 MG/DL

## 2017-09-11 PROCEDURE — 80061 LIPID PANEL: CPT | Performed by: INTERNAL MEDICINE

## 2017-09-11 PROCEDURE — 80053 COMPREHEN METABOLIC PANEL: CPT | Performed by: INTERNAL MEDICINE

## 2017-09-11 PROCEDURE — 36415 COLL VENOUS BLD VENIPUNCTURE: CPT | Performed by: INTERNAL MEDICINE

## 2017-09-11 PROCEDURE — 82306 VITAMIN D 25 HYDROXY: CPT | Performed by: INTERNAL MEDICINE

## 2017-09-11 NOTE — ACP (ADVANCE CARE PLANNING)
Advance Care Planning (ACP) Provider Conversation Snapshot    Date of ACP Conversation: 09/11/17  Persons included in Conversation:  patient  Length of ACP Conversation in minutes:  <16 minutes (Non-Billable)    Authorized Decision Maker (if patient is incapable of making informed decisions):    This person is:   Healthcare Agent/Medical Power of  under Advance Directive          For Patients with Decision Making Capacity:   Values/Goals: Exploration of values, goals, and preferences if recovery is not expected, even with continued medical treatment in the event of:  Imminent death  Severe, permanent brain injury    Conversation Outcomes / Follow-Up Plan:   Recommended completion of Advance Directive form after review of ACP materials and conversation with prospective healthcare agent

## 2017-09-11 NOTE — MR AVS SNAPSHOT
Visit Information Date & Time Provider Department Dept. Phone Encounter #  
 9/11/2017  9:30 AM Sarika Shah MD [address] [phone] [id number] Follow-up Instructions Return in about 1 week (around 9/18/2017) for f/u BP. Upcoming Health Maintenance Date Due  
 GLAUCOMA SCREENING Q2Y 1/15/2018 Pneumococcal 65+ Low/Medium Risk (2 of 2 - PPSV23) 6/10/2018 BREAST CANCER SCRN MAMMOGRAM 7/25/2018 MEDICARE YEARLY EXAM 9/12/2018 COLONOSCOPY 8/12/2024 DTaP/Tdap/Td series (2 - Td) 3/12/2026 Allergies as of 9/11/2017  Review Complete On: 9/11/2017 By: Sarika Shah MD  
  
 Severity Noted Reaction Type Reactions Other Food  09/11/2017    Other (comments)  
 balsamic vinegar/vinegar: swelling in leg Current Immunizations  Reviewed on 9/6/2016 Name Date Influenza High Dose Vaccine PF  Incomplete, 9/27/2015 Pneumococcal Conjugate (PCV-13) 4/25/2016 Pneumococcal Vaccine (Unspecified Type) 6/10/2013 Tdap 3/12/2016 11:04 AM  
  
 Not reviewed this visit You Were Diagnosed With   
  
 Codes Comments Encounter for Medicare annual wellness exam    -  Primary ICD-10-CM: Z00.00 ICD-9-CM: V70.0 Dyslipidemia     ICD-10-CM: E78.5 ICD-9-CM: 272.4 Vitamin D deficiency     ICD-10-CM: E55.9 ICD-9-CM: 268.9 Encounter for immunization     ICD-10-CM: G90 ICD-9-CM: V03.89 Vitals BP Pulse Temp Resp Height(growth percentile) Weight(growth percentile) (!) 160/98 (BP 1 Location: Right arm, BP Patient Position: Sitting) 71 97.9 °F (36.6 °C) (Oral) 16 5' 9\" (1.753 m) 177 lb 12.8 oz (80.6 kg) SpO2 BMI OB Status Smoking Status 100% 26.26 kg/m2 Hysterectomy Never Smoker Vitals History BMI and BSA Data Body Mass Index Body Surface Area  
 26.26 kg/m 2 1.98 m 2 Preferred Pharmacy Pharmacy Name Phone  [address] [place] Your Updated Medication List  
  
   
This list is accurate as of: 9/11/17  9:47 AM.  Always use your most recent med list.  
  
  
  
  
 atorvastatin 10 mg tablet Commonly known as:  LIPITOR  
TAKE 1 TABLET BY MOUTH DAILY FISH OIL 1,000 mg Cap Generic drug:  omega-3 fatty acids-vitamin e Take 2 Caps by mouth daily. iron 50 mg iron Tab Take  by mouth daily. Take one po daily TYLENOL ARTHRITIS PAIN 650 mg CR tablet Generic drug:  acetaminophen Take 650 mg by mouth every six (6) hours as needed. VITAMIN D3 2,000 unit Tab Generic drug:  cholecalciferol (vitamin D3) Take 2,000 Int'l Units by mouth daily. We Performed the Following ADMIN INFLUENZA VIRUS VAC [ [facility]] INFLUENZA VIRUS VACCINE, HIGH DOSE SEASONAL, PRESERVATIVE FREE [90779 CPT(R)] Follow-up Instructions Return in about 1 week (around 9/18/2017) for f/u BP. To-Do List   
 09/11/2017 Lab:  LIPID PANEL   
  
 09/11/2017 Lab:  METABOLIC PANEL, COMPREHENSIVE   
  
 09/11/2017 Lab:  VITAMIN D, 25 HYDROXY Patient Instructions 1) keep BP log and let us know if too high or too low. Best time is first thing in AM or before going to bed at night. Schedule of Personalized Health Plan (Provide Copy to Patient) The best way to stay healthy is to live a healthy lifestyle. A healthy lifestyle includes regular exercise, eating a well-balanced diet, keeping a healthy weight and not smoking. Regular physical exams and screening tests are another important way to take care of yourself. Preventive exams provided by health care providers can find health problems early when treatment works best and can keep you from getting certain diseases or illnesses. Preventive services include exams, lab tests, screenings, shots, monitoring and information to help you take care of your own health. All people over 65 should have a pneumonia shot. Pneumonia shots are usually only needed once in a lifetime unless your doctor decides differently. All people over 65 should have a yearly flu shot. People over 65 are at medium to high risk for Hepatitis B. Three shots are needed for complete protection. In addition to your physical exam, some screening tests are recommended: 
 
Bone mass measurement (dexa scan) is recommended every two years Diabetes Mellitus screening is recommended every year. Glaucoma is an eye disease caused by high pressure in the eye. An eye exam is recommended every year. Cardiovascular screening tests that check your cholesterol and other blood fat (lipid) levels are recommended every five years. Colorectal Cancer screening tests help to find pre-cancerous polyps (growths in the colon) so they can be removed before they turn into cancer. Tests ordered for screening depend on your personal and family history risk factors. Screening for Breast Cancer is recommended yearly with a mammogram. 
 
Screening for Cervical Cancer is recommended every two years (annually for certain risk factors, such as previous history of STD or abnormal PAP in past 7 years), with a Pelvic Exam with PAP Here is a list of your current Health Maintenance items with a due date: 
Health Maintenance Topic Date Due  
 INFLUENZA AGE 9 TO ADULT  08/01/2017  MEDICARE YEARLY EXAM  09/07/2017  GLAUCOMA SCREENING Q2Y  01/15/2018  Pneumococcal 65+ Low/Medium Risk (2 of 2 - PPSV23) 06/10/2018  BREAST CANCER SCRN MAMMOGRAM  07/25/2018  COLONOSCOPY  08/12/2024  
 DTaP/Tdap/Td series (2 - Td) 03/12/2026  Hepatitis C Screening  Completed  OSTEOPOROSIS SCREENING (DEXA)  Completed  ZOSTER VACCINE AGE 60>  Completed Advance Care Planning: Care Instructions Your Care Instructions It can be hard to live with an illness that cannot be cured.  But if your health is getting worse, you may want to make decisions about end-of-life care. Planning for the end of your life does not mean that you are giving up. It is a way to make sure that your wishes are met. Clearly stating your wishes can make it easier for your loved ones. Making plans while you are still able may also ease your mind and make your final days less stressful and more meaningful. Follow-up care is a key part of your treatment and safety. Be sure to make and go to all appointments, and call your doctor if you are having problems. It's also a good idea to know your test results and keep a list of the medicines you take. What can you do to plan for the end of life? · You can bring these issues up with your doctor. You do not need to wait until your doctor starts the conversation. You might start with \"I would not be willing to live with . James Opoka James Opoka James Opoka \" When you complete this sentence it helps your doctor understand your wishes. · Talk openly and honestly with your doctor. This is the best way to understand the decisions you will need to make as your health changes. Know that you can always change your mind. · Ask your doctor about commonly used life-support measures. These include tube feedings, breathing machines, and fluids given through a vein (IV). Understanding these treatments will help you decide whether you want them. · You may choose to have these life-supporting treatments for a limited time. This allows a trial period to see whether they will help you. You may also decide that you want your doctor to take only certain measures to keep you alive. It is important to spell out these conditions so that your doctor and family understand them. · Talk to your doctor about how long you are likely to live. He or she may be able to give you an idea of what usually happens with your specific illness. · Think about preparing papers that state your wishes.  This way there will not be any confusion about what you want. You can change your instructions at any time. Which papers should you prepare? Advance directives are legal papers that tell doctors how you want to be cared for at the end of your life. You do not need a  to write these papers. Ask your doctor or your [facility] department for information on how to write your advance directives. They may have the forms for each of these types of papers. Make sure your doctor has a copy of these on file, and give a copy to a family member or close friend. · Consider a do-not-resuscitate order (DNR). This order asks that no extra treatments be done if your heart stops or you stop breathing. Extra treatments may include cardiopulmonary resuscitation (CPR), electrical shock to restart your heart, or a machine to breathe for you. If you decide to have a DNR order, ask your doctor to explain and write it. Place the order in your home where everyone can easily see it. · Consider a living will. A living will explains your wishes about life support and other treatments at the end of your life if you become unable to speak for yourself. Living rubio tell doctors to use or not use treatments that would keep you alive. You must have one or two witnesses or a notary present when you sign this form. · Consider a durable power of  for health care. This allows you to name a person to make decisions about your care if you are not able to. Most people ask a close friend or family member. Talk to this person about the kinds of treatments you want and those that you do not want. Make sure this person understands your wishes. These legal papers are simple to change. Tell your doctor what you want to change, and ask him or her to make a note in your medical file. Give your family updated copies of the papers. Where can you learn more? Go to http://sheila-esdras.info/. Enter P184 in the search box to learn more about \"Advance Care Planning: Care Instructions. \" Current as of: November 17, 2016 Content Version: 11.3 © 5317-4043 [employer]. Care instructions adapted under license by [email or web address] (which disclaims liability or warranty for this information). If you have questions about a medical condition or this instruction, always ask your healthcare professional. [address] any warranty or liability for your use of this information. Learning About Durable Power of  for Health Care What is a durable power of  for health care? A durable power of  for health care is one type of the legal forms called advance directives. It lets you decide who you want to make treatment decisions for you if you cannot speak or decide for yourself. The person you choose is called your health care agent. Another type of advance directive is a living will. It lets you write down what kinds of treatment or life support you want or do not want. What should you think about when choosing a health care agent? Choose your health care agent carefully. This person may or may not be a family member. Talk to the person before you make your final decision. Make sure he or she is comfortable with this responsibility. It's a good idea to choose someone who: · Is at least 25years old. · Knows you well and understands what makes life meaningful for you. · Understands your Evangelical and moral values. · Will do what you want, not what he or she wants. · Will be able to make difficult choices at a stressful time. · Will be able to refuse or stop treatment, if that is what you would want, even if you could die. · Will be firm and confident with health professionals if needed. · Will ask questions to get necessary information. · Lives near you or agrees to travel to you if needed. Your family may help you make medical decisions while you can still be part of that process. But it is important to choose one person to be your health care agent in case you are not able to make decisions for yourself. If you don't fill out the legal form and name a health care agent, the decisions your family can make may be limited. Who will make decisions for you if you do not have a health care agent? If you don't have a health care agent or a living will, your family members may disagree about your medical care. And then some medical professionals who may not know you as well might have to make decisions for you. In some cases, a  makes the decisions. When you name a health care agent, it is very clear who has the power to make health decisions for you. How do you name a health care agent? You name your health care agent on a legal form. It is usually called a durable power of  for health care. Ask your hospital, state bar association, or office on aging where to find these forms. You must sign the form to make it legal. Some states require you to get the form notarized. This means that a person called a  watches you sign the form and then he or she signs the form. Some states also require that two or more witnesses sign the form. Be sure to tell your family members and doctors who your health care agent is. Keep your forms in a safe place. But make sure that your loved ones know where the forms are. This could be in your desk where you keep other important papers. Make sure your doctor has a copy of your forms. Where can you learn more? Go to http://sheila-esdras.info/. Enter [id number] in the search box to learn more about \"Learning About Durable Power of  for [facility]. \" Current as of: November 17, 2016 Content Version: 11.3 © 9408-6103 [employer].  Care instructions adapted under license by [address] (which disclaims liability or warranty for this information). If you have questions about a medical condition or this instruction, always ask your healthcare professional. [address] any warranty or liability for your use of this information. Deciding About Life-Prolonging Treatment What is life-prolonging treatment? There are many kinds of treatment that can help you live longer. These may be needed for only a short time until your illness improves. Or you may use them over the long term to help keep you alive. Some treatments include the use of: · Medicines to slow the progress of certain diseases, such as heart disease, diabetes, cancer, AIDS, or Alzheimer's disease. · Antibiotics to treat serious infections, such as pneumonia. · Dialysis to clean your blood if your kidneys stop working. · A breathing machine to help you breathe if you can't breathe on your own. This machine pumps air into your lungs through a tube put into your throat. · A feeding tube or an intravenous (IV) line to give you food and fluids if you can't eat or drink. · Cardiopulmonary resuscitation (CPR) to try to restart your heart. The decision to receive treatments that may help you live longer is a personal one. You may want your doctor to do everything possible to keep you alive, even when your chance for recovery is small. Or you may choose to only have care to manage your pain and other symptoms. What are key points about this decision? · If there is a good chance that your illness can be cured or managed, your doctor may advise you to first try available treatments. If these don't work, then you might think about stopping treatment. · If you stop treatment, you will still receive care that focuses on pain relief and comfort. · A decision to stop treatment that keeps you alive does not have to be permanent.  You can always change your mind if your health starts to improve. · Even though treatment focuses on helping you live longer, it may cause side effects that can greatly affect your quality of life. And it could affect how you spend time with your family and friends. · If you still have personal goals that you want to pursue, you may want treatment that keeps you alive long enough to reach them. Why might you choose life-prolonging treatment? · There is a good chance that your illness can be cured or managed. · You think you can manage the possible side effects of treatment. · You don't think treatment will get in the way of your quality of life. · You have personal goals that you still want to pursue and achieve. Why might you choose to stop life-prolonging treatment? · Your chance of surviving your illness is very low. · You have tried all possible treatments for your illness, but they have not helped. · You can no longer deal with the side effects of treatment. · You have already met the goals you set out to achieve in your life. Your decision Thinking about the facts and your feelings can help you make a decision that is right for you. Be sure you understand the benefits and risks of your options, and think about what else you need to do before you make the decision. Where can you learn more? Go to http://sheila-esdras.info/. Enter A670 in the search box to learn more about \"Deciding About Life-Prolonging Treatment. \" Current as of: August 8, 2016 Content Version: 11.3 © 8170-6643 [employer]. Care instructions adapted under license by [employer] (which disclaims liability or warranty for this information). If you have questions about a medical condition or this instruction, always ask your healthcare professional. [address] any warranty or liability for your use of this information. Jeanette Rothman 5[address] What is a living will? A living will is a legal form you use to write down the kind of care you want at the end of your life. It is used by the health professionals who will treat you if you aren't able to decide for yourself. If you put your wishes in writing, your loved ones and others will know what kind of care you want. They won't need to guess. This can ease your mind and be helpful to others. A living will is not the same as an estate or property will. An estate will explains what you want to happen with your money and property after you die. Is a living will a legal document? A living will is a legal document. Each state has its own laws about living rubio. If you move to another state, make sure that your living will is legal in the state where you now live. Or you might use a universal form that has been approved by many states. This kind of form can sometimes be completed and stored online. Your electronic copy will then be available wherever you have a connection to the Internet. In most cases, doctors will respect your wishes even if you have a form from a different state. · You don't need an  to complete a living will. But legal advice can be helpful if your state's laws are unclear, your health history is complicated, or your family can't agree on what should be in your living will. · You can change your living will at any time. Some people find that their wishes about end-of-life care change as their health changes. · In addition to making a living will, think about completing a medical power of  form. This form lets you name the person you want to make end-of-life treatment decisions for you (your \"health care agent\") if you're not able to. Many hospitals and nursing homes will give you the forms you need to complete a living will and a medical power of .  
· Your living will is used only if you can't make or communicate decisions for yourself anymore. If you become able to make decisions again, you can accept or refuse any treatment, no matter what you wrote in your living will. · Your state may offer an online registry. This is a place where you can store your living will online so the doctors and nurses who need to treat you can find it right away. What should you think about when creating a living will? Talk about your end-of-life wishes with your family members and your doctor. Let them know what you want. That way the people making decisions for you won't be surprised by your choices. Think about these questions as you make your living will: · Do you know enough about life support methods that might be used? If not, talk to your doctor so you know what might be done if you can't breathe on your own, your heart stops, or you're unable to swallow. · What things would you still want to be able to do after you receive life-support methods? Would you want to be able to walk? To speak? To eat on your own? To live without the help of machines? · If you have a choice, where do you want to be cared for? In your home? At a hospital or nursing home? · Do you want certain Moravian practices performed if you become very ill? · If you have a choice at the end of your life, where would you prefer to die? At home? In a hospital or nursing home? Somewhere else? · Would you prefer to be buried or cremated? · Do you want your organs to be donated after you die? What should you do with your living will? · Make sure that your family members and your health care agent have copies of your living will. · Give your doctor a copy of your living will to keep in your medical record. If you have more than one doctor, make sure that each one has a copy. · You may want to put a copy of your living will where it can be easily found. Where can you learn more? Go to http://sheila-esdras.info/. Enter F090 in the search box to learn more about \"Learning About Living [employer]. \" Current as of: August 8, 2016 Content Version: 11.3 © 8386-6788 [employer]. Care instructions adapted under license by [employer] (which disclaims liability or warranty for this information). If you have questions about a medical condition or this instruction, always ask your healthcare professional. [address] any warranty or liability for your use of this information. Deciding About Being on a Ventilator When You Have a Terminal Illness Your Care Instructions A ventilator is a life-support machine that helps you breathe if you can no longer breathe on your own. The machine provides oxygen to your lungs through a tube. The tube enters your mouth and goes down your throat to your lungs. Most people on ventilators have to be fed through another tube that goes into the stomach. You may feel that being on a ventilator would prevent a \"natural\" death or would keep you alive longer than necessary. Or you may feel that being on a ventilator would extend your life so you can do certain things, such as saying good-bye to loved ones. The decision about whether to be on a ventilator is a personal one. Be sure to talk it over with your doctor and loved ones. Follow-up care is a key part of your treatment and safety. Be sure to make and go to all appointments, and call your doctor if you are having problems. It's also a good idea to know your test results and keep a list of the medicines you take. Why might you want to be on a ventilator? · You think you may be able to return to your normal activities. · You need help breathing because of a short illness or a problem that is not related to your terminal illness. · You would like more time to say good-bye. · You feel that there are more benefits than risks. Why might you not want to be on a ventilator? · You have other long-term health problems. · You may not be able to return to your normal activities. · You want a calm, peaceful death. You do not want to spend the rest of your life on a ventilator. · You feel that there are more risks than benefits. When should you call for help? Be sure to contact your doctor if: 
· You want to learn more about being on a ventilator. · You change your mind about being on a ventilator. Where can you learn more? Go to http://sheila-esdras.info/. Enter Z870 in the search box to learn more about \"Deciding About Being on a Ventilator When You Have a Terminal Illness. \" Current as of: September 24, 2016 Content Version: 11.3 © 4836-4147 [employer]. Care instructions adapted under license by [employer] (which disclaims liability or warranty for this information). If you have questions about a medical condition or this instruction, always ask your healthcare professional. [place] [address] any warranty or liability for your use of this information. Advance Directives: Care Instructions Your Care Instructions An advance directive is a legal way to state your wishes at the end of your life. It tells your family and your doctor what to do if you can no longer say what you want. There are two main types of advance directives. You can change them any time that your wishes change. · A living will tells your family and your doctor your wishes about life support and other treatment. · A durable power of  for health care lets you name a person to make treatment decisions for you when you can't speak for yourself. This person is called a health care agent. If you do not have an advance directive, decisions about your medical care may be made by a doctor or a  who doesn't know you.  
It may help to think of an advance directive as a gift to the people who care for you. If you have one, they won't have to make tough decisions by themselves. Follow-up care is a key part of your treatment and safety. Be sure to make and go to all appointments, and call your doctor if you are having problems. It's also a good idea to know your test results and keep a list of the medicines you take. How can you care for yourself at home? · Discuss your wishes with your loved ones and your doctor. This way, there are no surprises. · Many states have a unique form. Or you might use a universal form that has been approved by many states. This kind of form can sometimes be completed and stored online. Your electronic copy will then be available wherever you have a connection to the Internet. In most cases, doctors will respect your wishes even if you have a form from a different state. · You don't need a  to do an advance directive. But you may want to get legal advice. · Think about these questions when you prepare an advance directive: ¨ Who do you want to make decisions about your medical care if you are not able to? Many people choose a family member or close friend. ¨ Do you know enough about life support methods that might be used? If not, talk to your doctor so you understand. ¨ What are you most afraid of that might happen? You might be afraid of having pain, losing your independence, or being kept alive by machines. ¨ Where would you prefer to die? Choices include your home, a hospital, or a nursing home. ¨ Would you like to have information about hospice care to support you and your family? ¨ Do you want to donate organs when you die? ¨ Do you want certain Muslim practices performed before you die? If so, put your wishes in the advance directive. · Read your advance directive every year, and make changes as needed. When should you call for help? Be sure to contact your doctor if you have any questions. Where can you learn more? Go to http://sheila-esdras.info/. Enter R264 in the search box to learn more about \"Advance Directives: Care Instructions. \" Current as of: November 17, 2016 Content Version: 11.3 © 9881-5822 [employer]. Care instructions adapted under license by [employer] (which disclaims liability or warranty for this information). If you have questions about a medical condition or this instruction, always ask your healthcare professional. [address] any warranty or liability for your use of this information. Home Blood Pressure Test: About This Test 
What is it? A home blood pressure test allows you to keep track of your blood pressure at home. Blood pressure is a measure of the force of blood against the walls of your arteries. Blood pressure readings include two numbers, such as 130/80 (say \"130 over 80\"). The first number is the systolic pressure. The second number is the diastolic pressure. Why is this test done? You may do this test at home to: · Find out if you have high blood pressure. · Track your blood pressure if you have high blood pressure. · Track how well medicine is working to reduce high blood pressure. · Check how lifestyle changes, such as weight loss and exercise, are affecting blood pressure. How can you prepare for the test? 
· Do not use caffeine, tobacco, or medicines known to raise blood pressure (such as nasal decongestant sprays) for at least 30 minutes before taking your blood pressure. · Do not exercise for at least 30 minutes before taking your blood pressure. What happens before the test? 
Take your blood pressure while you feel comfortable and relaxed. Sit quietly with both feet on the floor for at least 5 minutes before the test. 
What happens during the test? 
· Sit with your arm slightly bent and resting on a table so that your upper arm is at the same level as your heart. · Roll up your sleeve or take off your shirt to expose your upper arm. · Wrap the blood pressure cuff around your upper arm so that the lower edge of the cuff is about 1 inch above the bend of your elbow. Proceed with the following steps depending on if you are using an automatic or manual pressure monitor. Automatic blood pressure monitors · Press the on/off button on the automatic monitor and wait until the ready-to-measure \"heart\" symbol appears next to zero in the display window. · Press the start button. The cuff will inflate and deflate by itself. · Your blood pressure numbers will appear on the screen. · Write your numbers in your log book, along with the date and time. Manual blood pressure monitors · Place the earpieces of a stethoscope in your ears, and place the bell of the stethoscope over the artery, just below the cuff. · Close the valve on the rubber inflating bulb. · Squeeze the bulb rapidly with your opposite hand to inflate the cuff until the dial or column of mercury reads about 30 mm Hg higher than your usual systolic pressure. If you do not know your usual pressure, inflate the cuff to 210 mm Hg or until the pulse at your wrist disappears. · Open the pressure valve just slightly by twisting or pressing the valve on the bulb. · As you watch the pressure slowly fall, note the level on the dial at which you first start to hear a pulsing or tapping sound through the stethoscope. This is your systolic blood pressure. · Continue letting the air out slowly. The sounds will become muffled and will finally disappear. Note the pressure when the sounds completely disappear. This is your diastolic blood pressure. Let out all the remaining air. · Write your numbers in your log book, along with the date and time. What else should you know about the test? 
Results for adults ages 25 and older (mm Hg): · Normal (ideal): Systolic 020 or below. Diastolic 79 or below. · Prehypertension: Systolic 484 to 145. Diastolic 80 to 89. · Hypertension: Systolic 725 or above. Diastolic 90 or above. Follow-up care is a key part of your treatment and safety. Be sure to make and go to all appointments, and call your doctor if you are having problems. It's also a good idea to keep a list of the medicines you take. Where can you learn more? Go to http://sheila-esdras.info/. Enter C427 in the search box to learn more about \"Home Blood Pressure Test: About This Test.\" Current as of: November 3, 2016 Content Version: 11.3 © 4956-1712 [employer]. Care instructions adapted under license by [employer] (which disclaims liability or warranty for this information). If you have questions about a medical condition or this instruction, always ask your healthcare professional. [address] any warranty or liability for your use of this information. DASH Diet: Care Instructions Your Care Instructions The DASH diet is an eating plan that can help lower your blood pressure. DASH stands for Dietary Approaches to Stop Hypertension. Hypertension is high blood pressure. The DASH diet focuses on eating foods that are high in calcium, potassium, and magnesium. These nutrients can lower blood pressure. The foods that are highest in these nutrients are fruits, vegetables, low-fat dairy products, nuts, seeds, and legumes. But taking calcium, potassium, and magnesium supplements instead of eating foods that are high in those nutrients does not have the same effect. The DASH diet also includes whole grains, fish, and poultry. The DASH diet is one of several lifestyle changes your doctor may recommend to lower your high blood pressure. Your doctor may also want you to decrease the amount of sodium in your diet. Lowering sodium while following the DASH diet can lower blood pressure even further than just the DASH diet alone. Follow-up care is a key part of your treatment and safety. Be sure to make and go to all appointments, and call your doctor if you are having problems. It's also a good idea to know your test results and keep a list of the medicines you take. How can you care for yourself at home? Following the DASH diet · Eat 4 to 5 servings of fruit each day. A serving is 1 medium-sized piece of fruit, ½ cup chopped or canned fruit, 1/4 cup dried fruit, or 4 ounces (½ cup) of fruit juice. Choose fruit more often than fruit juice. · Eat 4 to 5 servings of vegetables each day. A serving is 1 cup of lettuce or raw leafy vegetables, ½ cup of chopped or cooked vegetables, or 4 ounces (½ cup) of vegetable juice. Choose vegetables more often than vegetable juice. · Get 2 to 3 servings of low-fat and fat-free dairy each day. A serving is 8 ounces of milk, 1 cup of yogurt, or 1 ½ ounces of cheese. · Eat 6 to 8 servings of grains each day. A serving is 1 slice of bread, 1 ounce of dry cereal, or ½ cup of cooked rice, pasta, or cooked cereal. Try to choose whole-grain products as much as possible. · Limit lean meat, poultry, and fish to 2 servings each day. A serving is 3 ounces, about the size of a deck of cards. · Eat 4 to 5 servings of nuts, seeds, and legumes (cooked dried beans, lentils, and split peas) each week. A serving is 1/3 cup of nuts, 2 tablespoons of seeds, or ½ cup of cooked beans or peas. · Limit fats and oils to 2 to 3 servings each day. A serving is 1 teaspoon of vegetable oil or 2 tablespoons of salad dressing. · Limit sweets and added sugars to 5 servings or less a week. A serving is 1 tablespoon jelly or jam, ½ cup sorbet, or 1 cup of lemonade. · Eat less than 2,300 milligrams (mg) of sodium a day. If you limit your sodium to 1,500 mg a day, you can lower your blood pressure even more. Tips for success · Start small.  Do not try to make dramatic changes to your diet all at once. You might feel that you are missing out on your favorite foods and then be more likely to not follow the plan. Make small changes, and stick with them. Once those changes become habit, add a few more changes. · Try some of the following: ¨ Make it a goal to eat a fruit or vegetable at every meal and at snacks. This will make it easy to get the recommended amount of fruits and vegetables each day. ¨ Try yogurt topped with fruit and nuts for a snack or healthy dessert. ¨ Add lettuce, tomato, cucumber, and onion to sandwiches. ¨ Combine a ready-made pizza crust with low-fat mozzarella cheese and lots of vegetable toppings. Try using tomatoes, squash, spinach, broccoli, carrots, cauliflower, and onions. ¨ Have a variety of cut-up vegetables with a low-fat dip as an appetizer instead of chips and dip. ¨ Sprinkle sunflower seeds or chopped almonds over salads. Or try adding chopped walnuts or almonds to cooked vegetables. ¨ Try some vegetarian meals using beans and peas. Add garbanzo or kidney beans to salads. Make burritos and tacos with mashed henderson beans or black beans. Where can you learn more? Go to http://sheila-esdras.info/. Enter M248 in the search box to learn more about \"DASH Diet: Care Instructions. \" Current as of: April 3, 2017 Content Version: 11.3 © 0879-6441 [employer]. Care instructions adapted under license by [employer] (which disclaims liability or warranty for this information). If you have questions about a medical condition or this instruction, always ask your healthcare professional. [place] [address] any warranty or liability for your use of this information. Low Sodium Diet (2,000 Milligram): Care Instructions Your Care Instructions Too much sodium causes your body to hold on to extra water. This can raise your blood pressure and force your heart and kidneys to work harder.  In very serious cases, this could cause you to be put in the hospital. It might even be life-threatening. By limiting sodium, you will feel better and lower your risk of serious problems. The most common source of sodium is salt. People get most of the salt in their diet from canned, prepared, and packaged foods. Fast food and restaurant meals also are very high in sodium. Your doctor will probably limit your sodium to less than 2,000 milligrams (mg) a day. This limit counts all the sodium in prepared and packaged foods and any salt you add to your food. Follow-up care is a key part of your treatment and safety. Be sure to make and go to all appointments, and call your doctor if you are having problems. It's also a good idea to know your test results and keep a list of the medicines you take. How can you care for yourself at home? Read food labels · Read labels on cans and food packages. The labels tell you how much sodium is in each serving. Make sure that you look at the serving size. If you eat more than the serving size, you have eaten more sodium. · Food labels also tell you the Percent Daily Value for sodium. Choose products with low Percent Daily Values for sodium. · Be aware that sodium can come in forms other than salt, including monosodium glutamate (MSG), sodium citrate, and sodium bicarbonate (baking soda). MSG is often added to Asian food. When you eat out, you can sometimes ask for food without MSG or added salt. Buy low-sodium foods · Buy foods that are labeled \"unsalted\" (no salt added), \"sodium-free\" (less than 5 mg of sodium per serving), or \"low-sodium\" (less than 140 mg of sodium per serving). Foods labeled \"reduced-sodium\" and \"light sodium\" may still have too much sodium. Be sure to read the label to see how much sodium you are getting. · Buy fresh vegetables, or frozen vegetables without added sauces. Buy low-sodium versions of canned vegetables, soups, and other canned goods. Prepare low-sodium meals · Cut back on the amount of salt you use in cooking. This will help you adjust to the taste. Do not add salt after cooking. One teaspoon of salt has about 2,300 mg of sodium. · Take the salt shaker off the table. · Flavor your food with garlic, lemon juice, onion, vinegar, herbs, and spices. Do not use soy sauce, lite soy sauce, steak sauce, onion salt, garlic salt, celery salt, mustard, or ketchup on your food. · Use low-sodium salad dressings, sauces, and ketchup. Or make your own salad dressings and sauces without adding salt. · Use less salt (or none) when recipes call for it. You can often use half the salt a recipe calls for without losing flavor. Other foods such as rice, pasta, and grains do not need added salt. · Rinse canned vegetables, and cook them in fresh water. This removes somebut not allof the salt. · Avoid water that is naturally high in sodium or that has been treated with water softeners, which add sodium. Call your local water company to find out the sodium content of your water supply. If you buy bottled water, read the label and choose a sodium-free brand. Avoid high-sodium foods · Avoid eating: ¨ Smoked, cured, salted, and canned meat, fish, and poultry. ¨ Ham, vargas, hot dogs, and luncheon meats. ¨ Regular, hard, and processed cheese and regular peanut butter. ¨ Crackers with salted tops, and other salted snack foods such as pretzels, chips, and salted popcorn. ¨ Frozen prepared meals, unless labeled low-sodium. ¨ Canned and dried soups, broths, and bouillon, unless labeled sodium-free or low-sodium. ¨ Canned vegetables, unless labeled sodium-free or low-sodium. ¨ [place] Donna fries, pizza, tacos, and other fast foods. ¨ Pickles, olives, ketchup, and other condiments, especially soy sauce, unless labeled sodium-free or low-sodium. Where can you learn more? Go to http://jose.info/. Enter P211 in the search box to learn more about \"Low Sodium Diet (2,000 Milligram): Care Instructions. \" Current as of: July 26, 2016 Content Version: 11.3 © 5208-9267 [employer]. Care instructions adapted under license by [employer] (which disclaims liability or warranty for this information). If you have questions about a medical condition or this instruction, always ask your healthcare professional. [place] [address] any warranty or liability for your use of this information. Introducing [facility]! [employer] introduces [employer] patient portal. Now you can access parts of your medical record, email your doctor's office, and request medication refills online. 1. In your internet browser, go to [email or web address] 2. Click on the First Time User? Click Here link in the Sign In box. You will see the New Member Sign Up page. 3. Enter your [employer] Access Code exactly as it appears below. You will not need to use this code after youve completed the sign-up process. If you do not sign up before the expiration date, you must request a new code. · [employer] Access Code: [email or web address] Expires: 12/10/2017  9:38 AM 
 
4. Enter the last four digits of your Social Security Number (xxxx) and Date of Birth (mm/dd/yyyy) as indicated and click Submit. You will be taken to the next sign-up page. 5. Create a [employer] ID. This will be your [employer] login ID and cannot be changed, so think of one that is secure and easy to remember. 6. Create a [employer] password. You can change your password at any time. 7. Enter your Password Reset Question and Answer. This can be used at a later time if you forget your password. 8. Enter your e-mail address. You will receive e-mail notification when new information is available in [address] 9. Click Sign Up. You can now view and download portions of your medical record. 10. Click the Download Summary menu link to download a portable copy of your medical information. If you have questions, please visit the Frequently Asked Questions section of the [employer] website. Remember, [employer] is NOT to be used for urgent needs. For medical emergencies, dial 911. Now available from your iPhone and Android! Please provide this summary of care documentation to your next provider. Your primary care clinician is listed as Lolly Bruno. If you have any questions after today's visit, please call [phone].

## 2017-09-11 NOTE — PROGRESS NOTES
Chief Complaint   Patient presents with    Hypertension    Cholesterol Problem         HPI:     Dutch Garber is a 75 y.o.  female with history of dyslipidemia and vitamin D deficiency here for the above complaint. She denies any chest pain, shortness of breath, abdominal pain, dizziness. Headache: little bit at back of head on left and does not last. Does not happen all the time. Denies any recent trauma. She denies blurred vision or dizziness. She will monitor. She may have eaten a lot of salt yesterday. She has some swelling in both her legs. Past Medical History:   Diagnosis Date    Advance directive discussed with patient 09/06/2016    Dyslipidemia     H/O colonoscopy 08/12/14    Done by Dr. Mathur Ends: diverticular dz & hemorrhoids, repeat in 10 years    Microcalcifications of the breast 2013    left breast s/p Vacuum-assisted core needle biopsy of the left breast with placement of localizing clip  6/18/2013. Being followed by Dr. Dima Simon. Suppose to have mammoram in 6 months. Path showed firbrocystic changes    PAOLA (obstructive sleep apnea) 03/02/2017    on CPAP machine    Sickle cell trait ([facility] [address]     Vitamin D deficiency        Past Surgical History:   Procedure Laterality Date    HX BREAST BIOPSY Bilateral 1994    bilateral biopsy-[facility]    HX HYSTERECTOMY  1994    unsure of exact date 5944-3858, partial hysterectomy.  kept both ovaries    HX TONSIL AND ADENOIDECTOMY  1962    KNEE SCOPE,MED OR LAT [facility]  2006    right knee    ANIBAL BIOPSY BREAST STEREOTACTIC Left 2013    benign           MEDICATION ALLERGIES/INTOLERANCES:   Allergies   Allergen Reactions    Other Food Other (comments)     balsamic vinegar/vinegar: swelling in leg             CURRENT MEDICATIONS:    Current Outpatient Prescriptions   Medication Sig    atorvastatin (LIPITOR) 10 mg tablet TAKE 1 TABLET BY MOUTH DAILY    cholecalciferol, vitamin D3, (VITAMIN D3) 2,000 unit tab Take 2,000 Int'l Units by mouth daily.  omega-3 fatty acids-vitamin e (FISH OIL) 1,000 mg cap Take 2 Caps by mouth daily.  iron 50 mg iron Tab Take  by mouth daily. Take one po daily    acetaminophen (TYLENOL ARTHRITIS PAIN) 650 mg CR tablet Take 650 mg by mouth every six (6) hours as needed. No current facility-administered medications for this visit. Health Maintenance   Topic Date Due    GLAUCOMA SCREENING Q2Y  01/15/2018    Pneumococcal 65+ Low/Medium Risk (2 of 2 - PPSV23) 06/10/2018    BREAST CANCER SCRN MAMMOGRAM  07/25/2018    MEDICARE YEARLY EXAM  09/12/2018    COLONOSCOPY  08/12/2024    DTaP/Tdap/Td series (2 - Td) 03/12/2026    Hepatitis C Screening  Completed    OSTEOPOROSIS SCREENING (DEXA)  Completed    ZOSTER VACCINE AGE 60>  Completed    INFLUENZA AGE 9 TO ADULT  Completed         FAMILY HISTORY:   Family History   Problem Relation Age of Onset    Hypertension Mother     Diabetes Mother     Hypertension Father     Hypertension Maternal Grandmother     Hypertension Maternal Grandfather        SOCIAL HISTORY:   She  reports that she has never smoked. She has never used smokeless tobacco.  She  reports that she drinks alcohol.           ROS:   General: negative for - chills, fatigue, fever, weight loss or weight gain, night sweats  HEENT: negative for - no sore throat, nasal congestion, vision problems or ear problems  Resp: negative for - cough, shortness of breath or wheezing  CV: negative for - chest pain, palpitations, orthopnea or PND,  GI: negative for - abdominal pain, change in bowel habits, constipation, diarrhea, blood or black tarry stools, or nausea/vomiting  : negative for - dysuria, hematuria, incontinence, pelvic pain or vulvar/vaginal symptoms  Heme: negative for -excessive bleeding or bruising  Endo: negative for - hot flashes, polydipsia/polyuria or hot or cold intolerance  MSK: negative for - joint pain, joint swelling or muscle pain  Neuro: negative for - numbness, tingling, or dizziness, positive for headaches. Derm: negative for - dry skin, hair changes, rash or skin lesion changes  Psych: negative for - anxiety, depression, irritability or mood swings or insomnia    OBJECTIVE:  PHYSICAL EXAM: Vitals:   Vitals:    09/11/17 0917 09/11/17 0946 09/11/17 0947   BP: 164/87 160/90 (!) 160/98   Pulse: 71     Resp: 16     Temp: 97.9 °F (36.6 °C)     TempSrc: Oral     SpO2: 100%     Weight: 177 lb 12.8 oz (80.6 kg)     Height: 5' 9\" (1.753 m)       Generally: Pleasant female in no acute distress    HEENT exam: Head: atraumatic       Cardiac exam: regular, rate, and rhythm. No murmurs, gallops, or rubs. Normal S1 and S2.    Pulmonary exam: Clear to ausculation bilaterally    Abdominal exam: Positive bowel sounds in all four quadrants, soft, nondistended, nontender. No hepatosplenomegaly. Extremities: 2+ dorsalis pedis bilaterally. 1+ pedal edema bilaterally. LABS/RADIOLOGICAL TESTS:   Lab Results   Component Value Date/Time    WBC 5.3 01/06/2017 11:23 AM    HGB 12.7 01/06/2017 11:23 AM    HCT 37.2 01/06/2017 11:23 AM    PLATELET 330 42/11/7653 11:23 AM     Lab Results   Component Value Date/Time    Sodium 141 05/11/2017 10:34 AM    Potassium 3.7 05/11/2017 10:34 AM    Chloride 103 05/11/2017 10:34 AM    CO2 29 05/11/2017 10:34 AM    Glucose 76 05/11/2017 10:34 AM    BUN 12 05/11/2017 10:34 AM    Creatinine 0.58 05/11/2017 10:34 AM     Lab Results   Component Value Date/Time    Cholesterol, total 228 05/11/2017 10:34 AM    HDL Cholesterol 114 05/11/2017 10:34 AM    LDL, calculated 102 05/11/2017 10:34 AM    Triglyceride 60 05/11/2017 10:34 AM     No results found for: GPT     Previous labs      ASSESSMENT/PLAN:      ICD-10-CM ICD-9-CM    1. Encounter for Medicare annual wellness exam Z00.00 V70.0    2. Dyslipidemia E78.5 272.4 We will see what the labs show. Continue diet, exercise and lipitor and fish oil.   METABOLIC PANEL, COMPREHENSIVE      LIPID PANEL 3. Elevated blood pressure reading R03.0 796.2 She will monitor her Blood pressure and get omron. She will let us know if too high or too low. This could be from the salt yesterday. 4. Vitamin D deficiency E55.9 268.9 VITAMIN D, 25 HYDROXY  Stable. Continue the vitamin D.    5. Encounter for immunization Z23 V03.89 INFLUENZA VIRUS VACCINE, HIGH DOSE SEASONAL, PRESERVATIVE FREE      ADMIN INFLUENZA VIRUS VAC     6. Patient verbalized understanding and agreement with the plan. 7. Patient was given after visit summary. 8.   Follow-up Disposition:  Return in about 1 week (around 9/18/2017) for f/u BP. or sooner if worsening symptoms.         Hernesto Willis MD

## 2017-09-11 NOTE — PROGRESS NOTES
Shayy Liu is a 75 y.o. female and presents for annual Medicare Wellness Visit. Problem List: Reviewed with patient and discussed risk factors. Patient Active Problem List   Diagnosis Code    Dyslipidemia E78.5    Sickle cell trait (HCC) D57.3    Microcalcifications of the breast R92.0    Vitamin D deficiency E55.9    Fibrocystic breast changes N60.19    H/O colonoscopy Z98.890    Advance directive discussed with patient Z70.80    PAOLA (obstructive sleep apnea) G47.33       Current medical providers:  Patient Care Team:  Gia Stark MD as PCP - General (Internal Medicine)  Eddie Rhodes RN as Nurse Navigator  Adry Knight MD (General Surgery)    [facility] Reviewed with patient  Past Surgical History:   Procedure Laterality Date    HX BREAST BIOPSY Bilateral 1994    bilateral biopsy-[facility]    HX HYSTERECTOMY  1994    unsure of exact date 4856-0825, partial hysterectomy. kept both ovaries    HX TONSIL AND ADENOIDECTOMY  1962    KNEE SCOPE,MED OR LAT [facility]  2006    right knee    ANIBAL BIOPSY BREAST STEREOTACTIC Left 2013    benign        SH: Reviewed with patient  Social History   Substance Use Topics    Smoking status: Never Smoker    Smokeless tobacco: Never Used      Comment: pt counseled to continue to not smoke.  Alcohol use 0.0 oz/week      Comment: occ       FH: Reviewed with patient  Family History   Problem Relation Age of Onset    Hypertension Mother     Diabetes Mother     Hypertension Father     Hypertension Maternal Grandmother     Hypertension Maternal Grandfather        Medications/Allergies: Reviewed with patient  Current Outpatient Prescriptions on File Prior to Visit   Medication Sig Dispense Refill    atorvastatin (LIPITOR) 10 mg tablet TAKE 1 TABLET BY MOUTH DAILY 90 Tab 3    cholecalciferol, vitamin D3, (VITAMIN D3) 2,000 unit tab Take 2,000 Int'l Units by mouth daily.       omega-3 fatty acids-vitamin e (FISH OIL) 1,000 mg cap Take 2 Caps by mouth daily.      iron 50 mg iron Tab Take  by mouth daily. Take one po daily      acetaminophen (TYLENOL ARTHRITIS PAIN) 650 mg CR tablet Take 650 mg by mouth every six (6) hours as needed. No current facility-administered medications on file prior to visit. No Known Allergies    Objective:  Visit Vitals    /87 (BP 1 Location: Left arm, BP Patient Position: Sitting)    Pulse 71    Temp 97.9 °F (36.6 °C) (Oral)    Resp 16    Ht 5' 9\" (1.753 m)    Wt 177 lb 12.8 oz (80.6 kg)    SpO2 100%    BMI 26.26 kg/m2    Body mass index is 26.26 kg/(m^2). Assessment of cognitive impairment: Alert and oriented x 3  Depression Screen:   PHQ over the last two weeks 9/11/2017   Little interest or pleasure in doing things Not at all   Feeling down, depressed or hopeless Not at all   Total Score PHQ 2 0       Fall Risk Assessment:    Fall Risk Assessment, last 12 mths 9/11/2017   Able to walk? Yes   Fall in past 12 months? Yes   Fall with injury? No   Number of falls in past 12 months 2   Fall Risk Score 2       Functional Ability:   Does the patient exhibit a steady gait? yes   How long did it take the patient to get up and walk from a sitting position? 5 seconds   Is the patient self reliant?  (ie can do own laundry, meals, household chores)  yes     Does the patient handle his/her own medications? yes     Does the patient handle his/her own money? yes     Is the patients home safe (ie good lighting, handrails on stairs and bath, etc.)? yes     Did you notice or did patient express any hearing difficulties? no     Did you notice or did patient express any vision difficulties? Yes, pt wears bifocals. Were distance and reading eye charts used? no       Advance Care Planning:   Patient was offered the opportunity to discuss advance care planning:  yes     Does patient have an Advance Directive:  no   If no, did you provide information on Caring Connections?   yes       Plan:      No orders of the defined types were placed in this encounter. Health Maintenance   Topic Date Due    INFLUENZA AGE 9 TO ADULT  08/01/2017    MEDICARE YEARLY EXAM  09/07/2017    GLAUCOMA SCREENING Q2Y  01/15/2018    Pneumococcal 65+ Low/Medium Risk (2 of 2 - PPSV23) 06/10/2018    BREAST CANCER SCRN MAMMOGRAM  07/25/2018    COLONOSCOPY  08/12/2024    DTaP/Tdap/Td series (2 - Td) 03/12/2026    Hepatitis C Screening  Completed    OSTEOPOROSIS SCREENING (DEXA)  Completed    ZOSTER VACCINE AGE 60>  Completed       *Patient verbalized understanding and agreement with the plan. A copy of the After Visit Summary with personalized health plan was given to the patient today. ROS:   General: negative for - chills, fatigue, fever, weight loss or weight gain, night sweats  HEENT: negative for - no sore throat, nasal congestion, vision problems or ear problems  Resp: negative for - cough, shortness of breath or wheezing  CV: negative for - chest pain, palpitations, orthopnea or PND,  GI: negative for - abdominal pain, change in bowel habits, constipation, diarrhea, blood or black tarry stools, or nausea/vomiting  : negative for - dysuria, hematuria, incontinence, pelvic pain or vulvar/vaginal symptoms  Heme: negative for -excessive bleeding or bruising  Endo: negative for - hot flashes, polydipsia/polyuria or hot or cold intolerance  MSK: negative for - joint pain, joint swelling or muscle pain  Neuro: negative for - numbness, tingling, or dizziness, positive for headaches.    Derm: negative for - dry skin, hair changes, rash or skin lesion changes  Psych: negative for - anxiety, depression, irritability or mood swings or insomnia    OBJECTIVE:  PHYSICAL EXAM: Vitals:   Vitals:    09/11/17 0917 09/11/17 0946 09/11/17 0947   BP: 164/87 160/90 (!) 160/98   Pulse: 71     Resp: 16     Temp: 97.9 °F (36.6 °C)     TempSrc: Oral     SpO2: 100%     Weight: 177 lb 12.8 oz (80.6 kg)     Height: 5' 9\" (1.753 m)       Generally: Pleasant female in no acute distress    HEENT exam: Head: atraumatic       Cardiac exam: regular, rate, and rhythm. No murmurs, gallops, or rubs. Normal S1 and S2.    Pulmonary exam: Clear to ausculation bilaterally    Abdominal exam: Positive bowel sounds in all four quadrants, soft, nondistended, nontender. No hepatosplenomegaly. Extremities: 2+ dorsalis pedis bilaterally. 1+ pedal edema bilaterally. LABS/RADIOLOGICAL TESTS:   Lab Results   Component Value Date/Time    WBC 5.3 01/06/2017 11:23 AM    HGB 12.7 01/06/2017 11:23 AM    HCT 37.2 01/06/2017 11:23 AM    PLATELET 448 20/46/9688 11:23 AM     Lab Results   Component Value Date/Time    Sodium 141 05/11/2017 10:34 AM    Potassium 3.7 05/11/2017 10:34 AM    Chloride 103 05/11/2017 10:34 AM    CO2 29 05/11/2017 10:34 AM    Glucose 76 05/11/2017 10:34 AM    BUN 12 05/11/2017 10:34 AM    Creatinine 0.58 05/11/2017 10:34 AM     Lab Results   Component Value Date/Time    Cholesterol, total 228 05/11/2017 10:34 AM    HDL Cholesterol 114 05/11/2017 10:34 AM    LDL, calculated 102 05/11/2017 10:34 AM    Triglyceride 60 05/11/2017 10:34 AM     No results found for: GPT     Previous labs      ASSESSMENT/PLAN:      ICD-10-CM ICD-9-CM    1. Encounter for Medicare annual wellness exam Z00.00 V70.0    2. Dyslipidemia E78.5 272.4 We will see what the labs show. Continue diet, exercise and lipitor and fish oil. METABOLIC PANEL, COMPREHENSIVE      LIPID PANEL   3. Elevated blood pressure reading R03.0 796.2 She will monitor her Blood pressure and get omron. She will let us know if too high or too low. This could be from the salt yesterday. 4. Vitamin D deficiency E55.9 268.9 VITAMIN D, 25 HYDROXY  Stable. Continue the vitamin D.    5. Encounter for immunization Z23 V03.89 INFLUENZA VIRUS VACCINE, HIGH DOSE SEASONAL, PRESERVATIVE FREE      ADMIN INFLUENZA VIRUS VAC     6. Patient verbalized understanding and agreement with the plan.     7. Patient was given after visit summary. 8.   Follow-up Disposition:  Return in about 1 week (around 9/18/2017) for f/u BP. or sooner if worsening symptoms.         Pallavi Sandy MD

## 2017-09-11 NOTE — PATIENT INSTRUCTIONS
1) keep BP log and let us know if too high or too low. Best time is first thing in AM or before going to bed at night. Schedule of Personalized Health Plan  (Provide Copy to Patient)  The best way to stay healthy is to live a healthy lifestyle. A healthy lifestyle includes regular exercise, eating a well-balanced diet, keeping a healthy weight and not smoking. Regular physical exams and screening tests are another important way to take care of yourself. Preventive exams provided by health care providers can find health problems early when treatment works best and can keep you from getting certain diseases or illnesses. Preventive services include exams, lab tests, screenings, shots, monitoring and information to help you take care of your own health. All people over 65 should have a pneumonia shot. Pneumonia shots are usually only needed once in a lifetime unless your doctor decides differently. All people over 65 should have a yearly flu shot. People over 65 are at medium to high risk for Hepatitis B. Three shots are needed for complete protection. In addition to your physical exam, some screening tests are recommended:    Bone mass measurement (dexa scan) is recommended every two years  Diabetes Mellitus screening is recommended every year. Glaucoma is an eye disease caused by high pressure in the eye. An eye exam is recommended every year. Cardiovascular screening tests that check your cholesterol and other blood fat (lipid) levels are recommended every five years. Colorectal Cancer screening tests help to find pre-cancerous polyps (growths in the colon) so they can be removed before they turn into cancer. Tests ordered for screening depend on your personal and family history risk factors.     Screening for Breast Cancer is recommended yearly with a mammogram.    Screening for Cervical Cancer is recommended every two years (annually for certain risk factors, such as previous history of STD or abnormal PAP in past 7 years), with a Pelvic Exam with PAP    Here is a list of your current Health Maintenance items with a due date:  Health Maintenance   Topic Date Due    INFLUENZA AGE 9 TO ADULT  08/01/2017    MEDICARE YEARLY EXAM  09/07/2017    GLAUCOMA SCREENING Q2Y  01/15/2018    Pneumococcal 65+ Low/Medium Risk (2 of 2 - PPSV23) 06/10/2018    BREAST CANCER SCRN MAMMOGRAM  07/25/2018    COLONOSCOPY  08/12/2024    DTaP/Tdap/Td series (2 - Td) 03/12/2026    Hepatitis C Screening  Completed    OSTEOPOROSIS SCREENING (DEXA)  Completed    ZOSTER VACCINE AGE 60>  Completed          Advance Care Planning: Care Instructions  Your Care Instructions  It can be hard to live with an illness that cannot be cured. But if your health is getting worse, you may want to make decisions about end-of-life care. Planning for the end of your life does not mean that you are giving up. It is a way to make sure that your wishes are met. Clearly stating your wishes can make it easier for your loved ones. Making plans while you are still able may also ease your mind and make your final days less stressful and more meaningful. Follow-up care is a key part of your treatment and safety. Be sure to make and go to all appointments, and call your doctor if you are having problems. It's also a good idea to know your test results and keep a list of the medicines you take. What can you do to plan for the end of life? · You can bring these issues up with your doctor. You do not need to wait until your doctor starts the conversation. You might start with \"I would not be willing to live with . Janet Kunal Janetevert Thompsoner Janetevert Thompsoner \" When you complete this sentence it helps your doctor understand your wishes. · Talk openly and honestly with your doctor. This is the best way to understand the decisions you will need to make as your health changes. Know that you can always change your mind. · Ask your doctor about commonly used life-support measures. These include tube feedings, breathing machines, and fluids given through a vein (IV). Understanding these treatments will help you decide whether you want them. · You may choose to have these life-supporting treatments for a limited time. This allows a trial period to see whether they will help you. You may also decide that you want your doctor to take only certain measures to keep you alive. It is important to spell out these conditions so that your doctor and family understand them. · Talk to your doctor about how long you are likely to live. He or she may be able to give you an idea of what usually happens with your specific illness. · Think about preparing papers that state your wishes. This way there will not be any confusion about what you want. You can change your instructions at any time. Which papers should you prepare? Advance directives are legal papers that tell doctors how you want to be cared for at the end of your life. You do not need a  to write these papers. Ask your doctor or your state [facility] department for information on how to write your advance directives. They may have the forms for each of these types of papers. Make sure your doctor has a copy of these on file, and give a copy to a family member or close friend. · Consider a do-not-resuscitate order (DNR). This order asks that no extra treatments be done if your heart stops or you stop breathing. Extra treatments may include cardiopulmonary resuscitation (CPR), electrical shock to restart your heart, or a machine to breathe for you. If you decide to have a DNR order, ask your doctor to explain and write it. Place the order in your home where everyone can easily see it. · Consider a living will. A living will explains your wishes about life support and other treatments at the end of your life if you become unable to speak for yourself. Living rubio tell doctors to use or not use treatments that would keep you alive.  You must have one or two witnesses or a notary present when you sign this form. · Consider a durable power of  for health care. This allows you to name a person to make decisions about your care if you are not able to. Most people ask a close friend or family member. Talk to this person about the kinds of treatments you want and those that you do not want. Make sure this person understands your wishes. These legal papers are simple to change. Tell your doctor what you want to change, and ask him or her to make a note in your medical file. Give your family updated copies of the papers. Where can you learn more? Go to http://sheila-esdras.info/. Enter P184 in the search box to learn more about \"Advance Care Planning: Care Instructions. \"  Current as of: November 17, 2016  Content Version: 11.3  © 3082-6779 [employer]. Care instructions adapted under license by [employer] (which disclaims liability or warranty for this information). If you have questions about a medical condition or this instruction, always ask your healthcare professional. [address] any warranty or liability for your use of this information. Learning About Durable Power of  for Health Care  What is a durable power of  for health care? A durable power of  for health care is one type of the legal forms called advance directives. It lets you decide who you want to make treatment decisions for you if you cannot speak or decide for yourself. The person you choose is called your health care agent. Another type of advance directive is a living will. It lets you write down what kinds of treatment or life support you want or do not want. What should you think about when choosing a health care agent? Choose your health care agent carefully. This person may or may not be a family member. Talk to the person before you make your final decision.  Make sure he or she is comfortable with this responsibility. It's a good idea to choose someone who:  · Is at least 25years old. · Knows you well and understands what makes life meaningful for you. · Understands your [employer] and moral values. · Will do what you want, not what he or she wants. · Will be able to make difficult choices at a stressful time. · Will be able to refuse or stop treatment, if that is what you would want, even if you could die. · Will be firm and confident with health professionals if needed. · Will ask questions to get necessary information. · Lives near you or agrees to travel to you if needed. Your family may help you make medical decisions while you can still be part of that process. But it is important to choose one person to be your health care agent in case you are not able to make decisions for yourself. If you don't fill out the legal form and name a health care agent, the decisions your family can make may be limited. Who will make decisions for you if you do not have a health care agent? If you don't have a health care agent or a living will, your family members may disagree about your medical care. And then some medical professionals who may not know you as well might have to make decisions for you. In some cases, a  makes the decisions. When you name a health care agent, it is very clear who has the power to make health decisions for you. How do you name a health care agent? You name your health care agent on a legal form. It is usually called a durable power of  for health care. Ask your hospital, state bar association, or office on aging where to find these forms. You must sign the form to make it legal. Some states require you to get the form notarized. This means that a person called a  watches you sign the form and then he or she signs the form. Some states also require that two or more witnesses sign the form.   Be sure to tell your family members and doctors who your health care agent is. Keep your forms in a safe place. But make sure that your loved ones know where the forms are. This could be in your desk where you keep other important papers. Make sure your doctor has a copy of your forms. Where can you learn more? Go to http://sheila-esdras.info/. Enter [id number] in the search box to learn more about \"Learning About Durable Power of  for [facility]. \"  Current as of: November 17, 2016  Content Version: 11.3  © 1973-6507 [employer]. Care instructions adapted under license by [employer] (which disclaims liability or warranty for this information). If you have questions about a medical condition or this instruction, always ask your healthcare professional. [address] any warranty or liability for your use of this information. Deciding About Life-Prolonging Treatment  What is life-prolonging treatment? There are many kinds of treatment that can help you live longer. These may be needed for only a short time until your illness improves. Or you may use them over the long term to help keep you alive. Some treatments include the use of:  · Medicines to slow the progress of certain diseases, such as heart disease, diabetes, cancer, AIDS, or Alzheimer's disease. · Antibiotics to treat serious infections, such as pneumonia. · Dialysis to clean your blood if your kidneys stop working. · A breathing machine to help you breathe if you can't breathe on your own. This machine pumps air into your lungs through a tube put into your throat. · A feeding tube or an intravenous (IV) line to give you food and fluids if you can't eat or drink. · Cardiopulmonary resuscitation (CPR) to try to restart your heart. The decision to receive treatments that may help you live longer is a personal one.  You may want your doctor to do everything possible to keep you alive, even when your chance for recovery is small. Or you may choose to only have care to manage your pain and other symptoms. What are key points about this decision? · If there is a good chance that your illness can be cured or managed, your doctor may advise you to first try available treatments. If these don't work, then you might think about stopping treatment. · If you stop treatment, you will still receive care that focuses on pain relief and comfort. · A decision to stop treatment that keeps you alive does not have to be permanent. You can always change your mind if your health starts to improve. · Even though treatment focuses on helping you live longer, it may cause side effects that can greatly affect your quality of life. And it could affect how you spend time with your family and friends. · If you still have personal goals that you want to pursue, you may want treatment that keeps you alive long enough to reach them. Why might you choose life-prolonging treatment? · There is a good chance that your illness can be cured or managed. · You think you can manage the possible side effects of treatment. · You don't think treatment will get in the way of your quality of life. · You have personal goals that you still want to pursue and achieve. Why might you choose to stop life-prolonging treatment? · Your chance of surviving your illness is very low. · You have tried all possible treatments for your illness, but they have not helped. · You can no longer deal with the side effects of treatment. · You have already met the goals you set out to achieve in your life. Your decision  Thinking about the facts and your feelings can help you make a decision that is right for you. Be sure you understand the benefits and risks of your options, and think about what else you need to do before you make the decision. Where can you learn more? Go to http://sheila-esdras.info/.   Enter Q344 in the search box to learn more about \"Deciding About Life-Prolonging Treatment. \"  Current as of: August 8, 2016  Content Version: 11.3  © 3759-6198 [employer]. Care instructions adapted under license by [employer]) (which disclaims liability or warranty for this information). If you have questions about a medical condition or this instruction, always ask your healthcare professional. [address] any warranty or liability for your use of this information. Learning About Living Perroy  What is a living will? A living will is a legal form you use to write down the kind of care you want at the end of your life. It is used by the health professionals who will treat you if you aren't able to decide for yourself. If you put your wishes in writing, your loved ones and others will know what kind of care you want. They won't need to guess. This can ease your mind and be helpful to others. A living will is not the same as an estate or property will. An estate will explains what you want to happen with your money and property after you die. Is a living will a legal document? A living will is a legal document. Each state has its own laws about living rubio. If you move to another state, make sure that your living will is legal in the state where you now live. Or you might use a universal form that has been approved by many states. This kind of form can sometimes be completed and stored online. Your electronic copy will then be available wherever you have a connection to the Internet. In most cases, doctors will respect your wishes even if you have a form from a different state. · You don't need an  to complete a living will. But legal advice can be helpful if your state's laws are unclear, your health history is complicated, or your family can't agree on what should be in your living will. · You can change your living will at any time.  Some people find that their wishes about end-of-life care change as their health changes. · In addition to making a living will, think about completing a medical power of  form. This form lets you name the person you want to make end-of-life treatment decisions for you (your \"health care agent\") if you're not able to. Many hospitals and nursing homes will give you the forms you need to complete a living will and a medical power of . · Your living will is used only if you can't make or communicate decisions for yourself anymore. If you become able to make decisions again, you can accept or refuse any treatment, no matter what you wrote in your living will. · Your state may offer an online registry. This is a place where you can store your living will online so the doctors and nurses who need to treat you can find it right away. What should you think about when creating a living will? Talk about your end-of-life wishes with your family members and your doctor. Let them know what you want. That way the people making decisions for you won't be surprised by your choices. Think about these questions as you make your living will:  · Do you know enough about life support methods that might be used? If not, talk to your doctor so you know what might be done if you can't breathe on your own, your heart stops, or you're unable to swallow. · What things would you still want to be able to do after you receive life-support methods? Would you want to be able to walk? To speak? To eat on your own? To live without the help of machines? · If you have a choice, where do you want to be cared for? In your home? At a hospital or nursing home? · Do you want certain Mu-ism practices performed if you become very ill? · If you have a choice at the end of your life, where would you prefer to die? At home? In a hospital or nursing home? Somewhere else? · Would you prefer to be buried or cremated? · Do you want your organs to be donated after you die?   What should you do with your living will? · Make sure that your family members and your health care agent have copies of your living will. · Give your doctor a copy of your living will to keep in your medical record. If you have more than one doctor, make sure that each one has a copy. · You may want to put a copy of your living will where it can be easily found. Where can you learn more? Go to http://sheila-esdras.info/. Enter H107 in the search box to learn more about \"Learning About Living Perroy. \"  Current as of: August 8, 2016  Content Version: 11.3  © 4988-4672 [employer]. Care instructions adapted under license by [employer] (which disclaims liability or warranty for this information). If you have questions about a medical condition or this instruction, always ask your healthcare professional. [address] any warranty or liability for your use of this information. Deciding About Being on a Ventilator When You Have a Terminal Illness  Your Care Instructions  A ventilator is a life-support machine that helps you breathe if you can no longer breathe on your own. The machine provides oxygen to your lungs through a tube. The tube enters your mouth and goes down your throat to your lungs. Most people on ventilators have to be fed through another tube that goes into the stomach. You may feel that being on a ventilator would prevent a \"natural\" death or would keep you alive longer than necessary. Or you may feel that being on a ventilator would extend your life so you can do certain things, such as saying good-bye to loved ones. The decision about whether to be on a ventilator is a personal one. Be sure to talk it over with your doctor and loved ones. Follow-up care is a key part of your treatment and safety. Be sure to make and go to all appointments, and call your doctor if you are having problems.  It's also a good idea to know your test results and keep a list of the medicines you take. Why might you want to be on a ventilator? · You think you may be able to return to your normal activities. · You need help breathing because of a short illness or a problem that is not related to your terminal illness. · You would like more time to say good-bye. · You feel that there are more benefits than risks. Why might you not want to be on a ventilator? · You have other long-term health problems. · You may not be able to return to your normal activities. · You want a calm, peaceful death. You do not want to spend the rest of your life on a ventilator. · You feel that there are more risks than benefits. When should you call for help? Be sure to contact your doctor if:  · You want to learn more about being on a ventilator. · You change your mind about being on a ventilator. Where can you learn more? Go to http://sheila-esdras.info/. Enter L108 in the search box to learn more about \"Deciding About Being on a Ventilator When You Have a Terminal Illness. \"  Current as of: September 24, 2016  Content Version: 11.3  © 0832-1389 [employer]. Care instructions adapted under license by [employer] (which disclaims liability or warranty for this information). If you have questions about a medical condition or this instruction, always ask your healthcare professional. [address] any warranty or liability for your use of this information. Advance Directives: Care Instructions  Your Care Instructions  An advance directive is a legal way to state your wishes at the end of your life. It tells your family and your doctor what to do if you can no longer say what you want. There are two main types of advance directives. You can change them any time that your wishes change. · A living will tells your family and your doctor your wishes about life support and other treatment.   · A durable power of  for health care lets you name a person to make treatment decisions for you when you can't speak for yourself. This person is called a health care agent. If you do not have an advance directive, decisions about your medical care may be made by a doctor or a  who doesn't know you. It may help to think of an advance directive as a gift to the people who care for you. If you have one, they won't have to make tough decisions by themselves. Follow-up care is a key part of your treatment and safety. Be sure to make and go to all appointments, and call your doctor if you are having problems. It's also a good idea to know your test results and keep a list of the medicines you take. How can you care for yourself at home? · Discuss your wishes with your loved ones and your doctor. This way, there are no surprises. · Many states have a unique form. Or you might use a universal form that has been approved by many states. This kind of form can sometimes be completed and stored online. Your electronic copy will then be available wherever you have a connection to the Internet. In most cases, doctors will respect your wishes even if you have a form from a different state. · You don't need a  to do an advance directive. But you may want to get legal advice. · Think about these questions when you prepare an advance directive:  ¨ Who do you want to make decisions about your medical care if you are not able to? Many people choose a family member or close friend. ¨ Do you know enough about life support methods that might be used? If not, talk to your doctor so you understand. ¨ What are you most afraid of that might happen? You might be afraid of having pain, losing your independence, or being kept alive by machines. ¨ Where would you prefer to die? Choices include your home, a hospital, or a nursing home. ¨ Would you like to have information about hospice care to support you and your family?   ¨ Do you want to donate organs when you die? ¨ Do you want certain Buddhism practices performed before you die? If so, put your wishes in the advance directive. · Read your advance directive every year, and make changes as needed. When should you call for help? Be sure to contact your doctor if you have any questions. Where can you learn more? Go to http://sheila-esdras.info/. Enter R264 in the search box to learn more about \"Advance Directives: Care Instructions. \"  Current as of: November 17, 2016  Content Version: 11.3  © 2008-4381 [employer]. Care instructions adapted under license by [employer] (which disclaims liability or warranty for this information). If you have questions about a medical condition or this instruction, always ask your healthcare professional. [address] any warranty or liability for your use of this information. Home Blood Pressure Test: About This Test  What is it? A home blood pressure test allows you to keep track of your blood pressure at home. Blood pressure is a measure of the force of blood against the walls of your arteries. Blood pressure readings include two numbers, such as 130/80 (say \"130 over 80\"). The first number is the systolic pressure. The second number is the diastolic pressure. Why is this test done? You may do this test at home to:  · Find out if you have high blood pressure. · Track your blood pressure if you have high blood pressure. · Track how well medicine is working to reduce high blood pressure. · Check how lifestyle changes, such as weight loss and exercise, are affecting blood pressure. How can you prepare for the test?  · Do not use caffeine, tobacco, or medicines known to raise blood pressure (such as nasal decongestant sprays) for at least 30 minutes before taking your blood pressure. · Do not exercise for at least 30 minutes before taking your blood pressure.   What happens before the test?  Take your blood pressure while you feel comfortable and relaxed. Sit quietly with both feet on the floor for at least 5 minutes before the test.  What happens during the test?  · Sit with your arm slightly bent and resting on a table so that your upper arm is at the same level as your heart. · Roll up your sleeve or take off your shirt to expose your upper arm. · Wrap the blood pressure cuff around your upper arm so that the lower edge of the cuff is about 1 inch above the bend of your elbow. Proceed with the following steps depending on if you are using an automatic or manual pressure monitor. Automatic blood pressure monitors  · Press the on/off button on the automatic monitor and wait until the ready-to-measure \"heart\" symbol appears next to zero in the display window. · Press the start button. The cuff will inflate and deflate by itself. · Your blood pressure numbers will appear on the screen. · Write your numbers in your log book, along with the date and time. Manual blood pressure monitors  · Place the earpieces of a stethoscope in your ears, and place the bell of the stethoscope over the artery, just below the cuff. · Close the valve on the rubber inflating bulb. · Squeeze the bulb rapidly with your opposite hand to inflate the cuff until the dial or column of mercury reads about 30 mm Hg higher than your usual systolic pressure. If you do not know your usual pressure, inflate the cuff to 210 mm Hg or until the pulse at your wrist disappears. · Open the pressure valve just slightly by twisting or pressing the valve on the bulb. · As you watch the pressure slowly fall, note the level on the dial at which you first start to hear a pulsing or tapping sound through the stethoscope. This is your systolic blood pressure. · Continue letting the air out slowly. The sounds will become muffled and will finally disappear. Note the pressure when the sounds completely disappear.  This is your diastolic blood pressure. Let out all the remaining air. · Write your numbers in your log book, along with the date and time. What else should you know about the test?  Results for adults ages 25 and older (mm Hg):  · Normal (ideal): Systolic 379 or below. Diastolic 79 or below. · Prehypertension: Systolic 830 to 911. Diastolic 80 to 89. · Hypertension: Systolic 498 or above. Diastolic 90 or above. Follow-up care is a key part of your treatment and safety. Be sure to make and go to all appointments, and call your doctor if you are having problems. It's also a good idea to keep a list of the medicines you take. Where can you learn more? Go to http://sheila-esdras.info/. Enter C427 in the search box to learn more about \"Home Blood Pressure Test: About This Test.\"  Current as of: November 3, 2016  Content Version: 11.3  © 8284-7025 [employer]. Care instructions adapted under license by [employer] (which disclaims liability or warranty for this information). If you have questions about a medical condition or this instruction, always ask your healthcare professional. [place] [address] any warranty or liability for your use of this information. DASH Diet: Care Instructions  Your Care Instructions  The DASH diet is an eating plan that can help lower your blood pressure. DASH stands for Dietary Approaches to Stop Hypertension. Hypertension is high blood pressure. The DASH diet focuses on eating foods that are high in calcium, potassium, and magnesium. These nutrients can lower blood pressure. The foods that are highest in these nutrients are fruits, vegetables, low-fat dairy products, nuts, seeds, and legumes. But taking calcium, potassium, and magnesium supplements instead of eating foods that are high in those nutrients does not have the same effect. The DASH diet also includes whole grains, fish, and poultry.   The DASH diet is one of several lifestyle changes your doctor may recommend to lower your high blood pressure. Your doctor may also want you to decrease the amount of sodium in your diet. Lowering sodium while following the DASH diet can lower blood pressure even further than just the DASH diet alone. Follow-up care is a key part of your treatment and safety. Be sure to make and go to all appointments, and call your doctor if you are having problems. It's also a good idea to know your test results and keep a list of the medicines you take. How can you care for yourself at home? Following the DASH diet  · Eat 4 to 5 servings of fruit each day. A serving is 1 medium-sized piece of fruit, ½ cup chopped or canned fruit, 1/4 cup dried fruit, or 4 ounces (½ cup) of fruit juice. Choose fruit more often than fruit juice. · Eat 4 to 5 servings of vegetables each day. A serving is 1 cup of lettuce or raw leafy vegetables, ½ cup of chopped or cooked vegetables, or 4 ounces (½ cup) of vegetable juice. Choose vegetables more often than vegetable juice. · Get 2 to 3 servings of low-fat and fat-free dairy each day. A serving is 8 ounces of milk, 1 cup of yogurt, or 1 ½ ounces of cheese. · Eat 6 to 8 servings of grains each day. A serving is 1 slice of bread, 1 ounce of dry cereal, or ½ cup of cooked rice, pasta, or cooked cereal. Try to choose whole-grain products as much as possible. · Limit lean meat, poultry, and fish to 2 servings each day. A serving is 3 ounces, about the size of a deck of cards. · Eat 4 to 5 servings of nuts, seeds, and legumes (cooked dried beans, lentils, and split peas) each week. A serving is 1/3 cup of nuts, 2 tablespoons of seeds, or ½ cup of cooked beans or peas. · Limit fats and oils to 2 to 3 servings each day. A serving is 1 teaspoon of vegetable oil or 2 tablespoons of salad dressing. · Limit sweets and added sugars to 5 servings or less a week.  A serving is 1 tablespoon jelly or jam, ½ cup sorbet, or 1 cup of lemonade. · Eat less than 2,300 milligrams (mg) of sodium a day. If you limit your sodium to 1,500 mg a day, you can lower your blood pressure even more. Tips for success  · Start small. Do not try to make dramatic changes to your diet all at once. You might feel that you are missing out on your favorite foods and then be more likely to not follow the plan. Make small changes, and stick with them. Once those changes become habit, add a few more changes. · Try some of the following:  ¨ Make it a goal to eat a fruit or vegetable at every meal and at snacks. This will make it easy to get the recommended amount of fruits and vegetables each day. ¨ Try yogurt topped with fruit and nuts for a snack or healthy dessert. ¨ Add lettuce, tomato, cucumber, and onion to sandwiches. ¨ Combine a ready-made pizza crust with low-fat mozzarella cheese and lots of vegetable toppings. Try using tomatoes, squash, spinach, broccoli, carrots, cauliflower, and onions. ¨ Have a variety of cut-up vegetables with a low-fat dip as an appetizer instead of chips and dip. ¨ Sprinkle sunflower seeds or chopped almonds over salads. Or try adding chopped walnuts or almonds to cooked vegetables. ¨ Try some vegetarian meals using beans and peas. Add garbanzo or kidney beans to salads. Make burritos and tacos with mashed henderson beans or black beans. Where can you learn more? Go to http://sheila-esdras.info/. Enter E027 in the search box to learn more about \"DASH Diet: Care Instructions. \"  Current as of: April 3, 2017  Content Version: 11.3  © 9115-4419 [employer]. Care instructions adapted under license by [employer] (which disclaims liability or warranty for this information). If you have questions about a medical condition or this instruction, always ask your healthcare professional. [address] any warranty or liability for your use of this information.        Low Sodium Diet (2,000 Milligram): Care Instructions  Your Care Instructions  Too much sodium causes your body to hold on to extra water. This can raise your blood pressure and force your heart and kidneys to work harder. In very serious cases, this could cause you to be put in the hospital. It might even be life-threatening. By limiting sodium, you will feel better and lower your risk of serious problems. The most common source of sodium is salt. People get most of the salt in their diet from canned, prepared, and packaged foods. Fast food and restaurant meals also are very high in sodium. Your doctor will probably limit your sodium to less than 2,000 milligrams (mg) a day. This limit counts all the sodium in prepared and packaged foods and any salt you add to your food. Follow-up care is a key part of your treatment and safety. Be sure to make and go to all appointments, and call your doctor if you are having problems. It's also a good idea to know your test results and keep a list of the medicines you take. How can you care for yourself at home? Read food labels  · Read labels on cans and food packages. The labels tell you how much sodium is in each serving. Make sure that you look at the serving size. If you eat more than the serving size, you have eaten more sodium. · Food labels also tell you the Percent Daily Value for sodium. Choose products with low Percent Daily Values for sodium. · Be aware that sodium can come in forms other than salt, including monosodium glutamate (MSG), sodium citrate, and sodium bicarbonate (baking soda). MSG is often added to Asian food. When you eat out, you can sometimes ask for food without MSG or added salt. Buy low-sodium foods  · Buy foods that are labeled \"unsalted\" (no salt added), \"sodium-free\" (less than 5 mg of sodium per serving), or \"low-sodium\" (less than 140 mg of sodium per serving). Foods labeled \"reduced-sodium\" and \"light sodium\" may still have too much sodium.  Be sure to read the label to see how much sodium you are getting. · Buy fresh vegetables, or frozen vegetables without added sauces. Buy low-sodium versions of canned vegetables, soups, and other canned goods. Prepare low-sodium meals  · Cut back on the amount of salt you use in cooking. This will help you adjust to the taste. Do not add salt after cooking. One teaspoon of salt has about 2,300 mg of sodium. · Take the salt shaker off the table. · Flavor your food with garlic, lemon juice, onion, vinegar, herbs, and spices. Do not use soy sauce, lite soy sauce, steak sauce, onion salt, garlic salt, celery salt, mustard, or ketchup on your food. · Use low-sodium salad dressings, sauces, and ketchup. Or make your own salad dressings and sauces without adding salt. · Use less salt (or none) when recipes call for it. You can often use half the salt a recipe calls for without losing flavor. Other foods such as rice, pasta, and grains do not need added salt. · Rinse canned vegetables, and cook them in fresh water. This removes somebut not allof the salt. · Avoid water that is naturally high in sodium or that has been treated with water softeners, which add sodium. Call your local water company to find out the sodium content of your water supply. If you buy bottled water, read the label and choose a sodium-free brand. Avoid high-sodium foods  · Avoid eating:  ¨ Smoked, cured, salted, and canned meat, fish, and poultry. ¨ Ham, vargas, hot dogs, and luncheon meats. ¨ Regular, hard, and processed cheese and regular peanut butter. ¨ Crackers with salted tops, and other salted snack foods such as pretzels, chips, and salted popcorn. ¨ Frozen prepared meals, unless labeled low-sodium. ¨ Canned and dried soups, broths, and bouillon, unless labeled sodium-free or low-sodium. ¨ Canned vegetables, unless labeled sodium-free or low-sodium. ¨ Western Donna fries, pizza, tacos, and other fast foods.   ¨ Pickles, olives, ketchup, and other condiments, especially soy sauce, unless labeled sodium-free or low-sodium. Where can you learn more? Go to http://sheila-esdras.info/. Enter P148 in the search box to learn more about \"Low Sodium Diet (2,000 Milligram): Care Instructions. \"  Current as of: July 26, 2016  Content Version: 11.3  © 0168-1948 [employer]. Care instructions adapted under license by [employer] (which disclaims liability or warranty for this information). If you have questions about a medical condition or this instruction, always ask your healthcare professional. [place] [address] any warranty or liability for your use of this information.

## 2017-09-11 NOTE — PROGRESS NOTES
Pt presents today for F/U hypertension, cholesterol and a medicare wellness visit. Pt preferred language for health care discussion is english. Is someone accompanying this pt? no    Is the patient using any DME equipment during OV? no    Depression Screening completed. yes    Abuse Screening completed. yes    Health Maintenance reviewed and discussed per provider. Yes    Advance Directive:  1. Do you have an advance directive in place? Patient Reply:  No papers given. Coordination of Care:  1. Have you been to the ER, urgent care clinic since your last visit? Hospitalized since your last visit? no    2. Have you seen or consulted any other health care providers outside of the [address] [place] [address] since your last visit? Include any pap smears or colon screening.  no

## 2017-09-12 ENCOUNTER — TELEPHONE (OUTPATIENT)
Dept: INTERNAL MEDICINE CLINIC | Age: 67
End: 2017-09-12

## 2017-09-12 NOTE — PROGRESS NOTES
Please let pt know that labs were normal except:    1) lipids are up. Has she missed any dosages of her lipitor 10mg one po daily? If so, she needs to take. Otherwise, we will increase her lipitor to 10mg 2 po daily. Work on diet and exercise. 2) how is her swelling and Blood pressure doing?

## 2017-09-12 NOTE — TELEPHONE ENCOUNTER
----- Message from Mikayla Diaz MD sent at 9/12/2017  9:08 AM EDT -----  Please let pt know that labs were normal except:    1) lipids are up. Has she missed any dosages of her lipitor 10mg one po daily? If so, she needs to take. Otherwise, we will increase her lipitor to 10mg 2 po daily. Work on diet and exercise. 2) how is her swelling and Blood pressure doing?

## 2017-09-13 NOTE — TELEPHONE ENCOUNTER
Spoke with patient, verified with 2 identifiers. Patient advised of below. Verbalized understanding. Patient stated that her swelling has gone down and she has not had a chance to  her BP machine yet but she is going out today to do so. Patient stated that she will call back at end of week with numbers.

## 2017-09-21 NOTE — TELEPHONE ENCOUNTER
2 pt. Identifiers confirmed. Pt. States she took her BP on tues and it was better than it was at the visit. She is not at home and states that she will call back with the reading. No other questions/concerns at this time.

## 2017-12-19 ENCOUNTER — HOSPITAL ENCOUNTER (OUTPATIENT)
Dept: LAB | Age: 67
Discharge: HOME | End: 2017-12-19
Payer: MEDICARE

## 2017-12-19 ENCOUNTER — OFFICE VISIT (OUTPATIENT)
Dept: INTERNAL MEDICINE CLINIC | Age: 67
End: 2017-12-19

## 2017-12-19 VITALS
HEIGHT: 69 IN | TEMPERATURE: 97.5 F | OXYGEN SATURATION: 100 % | RESPIRATION RATE: 18 BRPM | SYSTOLIC BLOOD PRESSURE: 126 MMHG | BODY MASS INDEX: 26.48 KG/M2 | HEART RATE: 76 BPM | WEIGHT: 178.8 LBS | DIASTOLIC BLOOD PRESSURE: 86 MMHG

## 2017-12-19 DIAGNOSIS — G47.33 OSA ON CPAP: ICD-10-CM

## 2017-12-19 DIAGNOSIS — E78.5 DYSLIPIDEMIA: Primary | ICD-10-CM

## 2017-12-19 DIAGNOSIS — E78.5 DYSLIPIDEMIA: ICD-10-CM

## 2017-12-19 DIAGNOSIS — E55.9 VITAMIN D DEFICIENCY: ICD-10-CM

## 2017-12-19 DIAGNOSIS — Z99.89 OSA ON CPAP: ICD-10-CM

## 2017-12-19 LAB
25(OH)D3 SERPL-MCNC: 36.4 NG/ML (ref 30–100)
ALBUMIN SERPL-MCNC: 3.9 G/DL (ref 3.4–5)
ALBUMIN/GLOB SERPL: 1.3 {RATIO} (ref 0.8–1.7)
ALP SERPL-CCNC: 112 U/L (ref 45–117)
ALT SERPL-CCNC: 22 U/L (ref 13–56)
ANION GAP SERPL CALC-SCNC: 7 MMOL/L (ref 3–18)
AST SERPL-CCNC: 18 U/L (ref 15–37)
BILIRUB SERPL-MCNC: 0.7 MG/DL (ref 0.2–1)
BUN SERPL-MCNC: 14 MG/DL (ref 7–18)
BUN/CREAT SERPL: 26 (ref 12–20)
CALCIUM SERPL-MCNC: 8.9 MG/DL (ref 8.5–10.1)
CHLORIDE SERPL-SCNC: 107 MMOL/L (ref 100–108)
CHOLEST SERPL-MCNC: 219 MG/DL
CO2 SERPL-SCNC: 30 MMOL/L (ref 21–32)
CREAT SERPL-MCNC: 0.53 MG/DL (ref 0.6–1.3)
GLOBULIN SER CALC-MCNC: 3 G/DL (ref 2–4)
GLUCOSE SERPL-MCNC: 80 MG/DL (ref 74–99)
HDLC SERPL-MCNC: 119 MG/DL (ref 40–60)
HDLC SERPL: 1.8 {RATIO} (ref 0–5)
LDLC SERPL CALC-MCNC: 88.2 MG/DL (ref 0–100)
LIPID PROFILE,FLP: ABNORMAL
POTASSIUM SERPL-SCNC: 3.5 MMOL/L (ref 3.5–5.5)
PROT SERPL-MCNC: 6.9 G/DL (ref 6.4–8.2)
SODIUM SERPL-SCNC: 144 MMOL/L (ref 136–145)
TRIGL SERPL-MCNC: 59 MG/DL (ref ?–150)
VLDLC SERPL CALC-MCNC: 11.8 MG/DL

## 2017-12-19 PROCEDURE — 80061 LIPID PANEL: CPT | Performed by: INTERNAL MEDICINE

## 2017-12-19 PROCEDURE — 36415 COLL VENOUS BLD VENIPUNCTURE: CPT | Performed by: INTERNAL MEDICINE

## 2017-12-19 PROCEDURE — 80053 COMPREHEN METABOLIC PANEL: CPT | Performed by: INTERNAL MEDICINE

## 2017-12-19 PROCEDURE — 82306 VITAMIN D 25 HYDROXY: CPT | Performed by: INTERNAL MEDICINE

## 2017-12-19 NOTE — PROGRESS NOTES
ROOM # 1    Stacia Mcgovern presents today for   Chief Complaint   Patient presents with    Cholesterol Problem     3 month f/u       Stacia Mcgovern preferred language for health care discussion is english/other. Is someone accompanying this pt? no    Is the patient using any DME equipment during OV? no    Depression Screening:  PHQ over the last two weeks 12/19/2017 9/11/2017 5/9/2017 5/9/2017 1/5/2017 9/6/2016 4/25/2016   Little interest or pleasure in doing things Not at all Not at all Not at all Not at all Not at all Not at all Not at all   Feeling down, depressed or hopeless Not at all Not at all Not at all - Not at all Not at all Not at all   Total Score PHQ 2 0 0 0 - 0 0 0       Learning Assessment:  Learning Assessment 3/9/2015 1/28/2014   PRIMARY LEARNER Patient Patient   HIGHEST LEVEL OF EDUCATION - PRIMARY LEARNER  > 4 [address]   LEARNER PREFERENCE PRIMARY DEMONSTRATION READING   ANSWERED BY Patient pt   RELATIONSHIP SELF SELF       Abuse Screening:  Abuse Screening Questionnaire 9/11/2017 5/9/2017 11/25/2015   Do you ever feel afraid of your partner? N N N   Are you in a relationship with someone who physically or mentally threatens you? N N N   Is it safe for you to go home? Fátima Martin       Fall Risk  Fall Risk Assessment, last 12 mths 12/19/2017 9/11/2017 5/9/2017 1/5/2017 9/6/2016 4/25/2016 3/9/2015   Able to walk? Yes Yes Yes Yes Yes Yes Yes   Fall in past 12 months? Yes Yes Yes No No No Yes   Fall with injury? No No No - - - No   Number of falls in past 12 months 5 2 1 - - - 2   Fall Risk Score 5 2 1 - - - 2       Health Maintenance reviewed and discussed per provider. Yes    Stacia Mcgovern is due for glaucoma screening. Please order/place referral if appropriate. Advance Directive:  1. Do you have an advance directive in place? Patient Reply: no    2. If not, would you like material regarding how to put one in place?  Patient Reply: no    Coordination of Care:  1. Have you been to the ER, urgent care clinic since your last visit? Hospitalized since your last visit? no    2. Have you seen or consulted any other health care providers outside of the [address] [place] [address] since your last visit? Include any pap smears or colon screening.  no

## 2017-12-19 NOTE — MR AVS SNAPSHOT
Visit Information Date & Time Provider Department Dept. Phone Encounter #  
 12/19/2017  9:15 AM Faviola Mendes MD [email or web address] [phone] [id number] Follow-up Instructions Return in about 4 months (around 4/19/2018) for f/u lipids. Upcoming Health Maintenance Date Due  
 GLAUCOMA SCREENING Q2Y 8/25/2018* Pneumococcal 65+ Low/Medium Risk (2 of 2 - PPSV23) 6/10/2018 MEDICARE YEARLY EXAM 9/12/2018 COLONOSCOPY 8/12/2024 DTaP/Tdap/Td series (2 - Td) 3/12/2026 *Topic was postponed. The date shown is not the original due date. Allergies as of 12/19/2017  Review Complete On: 12/19/2017 By: Shanna Barcenas MD  
  
 Severity Noted Reaction Type Reactions Other Food  09/11/2017    Other (comments)  
 balsamic vinegar/vinegar: swelling in leg Current Immunizations  Reviewed on 12/18/2017 Name Date Influenza High Dose Vaccine PF 9/11/2017  9:59 AM, 9/27/2015 Pneumococcal Conjugate (PCV-13) 4/25/2016 Pneumococcal Vaccine (Unspecified Type) 6/10/2013 Tdap 3/12/2016 11:04 AM  
  
 Not reviewed this visit You Were Diagnosed With   
  
 Codes Comments Dyslipidemia    -  Primary ICD-10-CM: E78.5 ICD-9-CM: 272.4 Vitamin D deficiency     ICD-10-CM: E55.9 ICD-9-CM: 268.9 Vitals BP Pulse Temp Resp Height(growth percentile) Weight(growth percentile) 126/86 (BP 1 Location: Left arm, BP Patient Position: Sitting) 76 97.5 °F (36.4 °C) (Oral) 18 5' 9\" (1.753 m) 178 lb 12.8 oz (81.1 kg) SpO2 BMI OB Status Smoking Status 100% 26.4 kg/m2 Hysterectomy Never Smoker Vitals History BMI and BSA Data Body Mass Index Body Surface Area  
 26.4 kg/m 2 1.99 m 2 Preferred Pharmacy Pharmacy Name Phone [facility] [address] [place] [address] Eduar Perdomo Your Updated Medication List  
  
   
 This list is accurate as of: 12/19/17  9:34 AM.  Always use your most recent med list.  
  
  
  
  
 atorvastatin 10 mg tablet Commonly known as:  LIPITOR  
TAKE 1 TABLET BY MOUTH DAILY FISH OIL 1,000 mg Cap Generic drug:  omega-3 fatty acids-vitamin e Take 2 Caps by mouth daily. iron 50 mg iron Tab Take  by mouth daily. Take one po daily TYLENOL ARTHRITIS PAIN 650 mg Hetal Said Generic drug:  acetaminophen Take 650 mg by mouth every six (6) hours as needed. VITAMIN D3 2,000 unit Tab Generic drug:  cholecalciferol (vitamin D3) Take 2,000 Int'l Units by mouth daily. Follow-up Instructions Return in about 4 months (around 4/19/2018) for f/u lipids. To-Do List   
 12/19/2017 Lab:  LIPID PANEL   
  
 12/19/2017 Lab:  METABOLIC PANEL, COMPREHENSIVE   
  
 12/19/2017 Lab:  VITAMIN D, 25 HYDROXY Patient Instructions 1) follow-up in 4 months or sooner if worsening symptoms. Introducing \[facility] [place] [facility]\"" & HEALTH SERVICES! Alvaro Carver introduces [employer] patient portal. Now you can access parts of your medical record, email your doctor's office, and request medication refills online. 1. In your internet browser, go to [email or web address] 2. Click on the First Time User? Click Here link in the Sign In box. You will see the New Member Sign Up page. 3. Enter your [employer] Access Code exactly as it appears below. You will not need to use this code after youve completed the sign-up process. If you do not sign up before the expiration date, you must request a new code. · [employer] Access Code: [email or web address] Expires: 3/19/2018  9:25 AM 
 
4. Enter the last four digits of your Social Security Number (xxxx) and Date of Birth (mm/dd/yyyy) as indicated and click Submit. You will be taken to the next sign-up page. 5. Create a [employer] ID.  This will be your [employer] login ID and cannot be changed, so think of one that is secure and easy to remember. 6. Create a [employer] password. You can change your password at any time. 7. Enter your Password Reset Question and Answer. This can be used at a later time if you forget your password. 8. Enter your e-mail address. You will receive e-mail notification when new information is available in [address] 9. Click Sign Up. You can now view and download portions of your medical record. 10. Click the Download Summary menu link to download a portable copy of your medical information. If you have questions, please visit the Frequently Asked Questions section of the [employer] website. Remember, [employer] is NOT to be used for urgent needs. For medical emergencies, dial 911. Now available from your iPhone and Android! Please provide this summary of care documentation to your next provider. Your primary care clinician is listed as Lolly Bruno. If you have any questions after today's visit, please call [phone].

## 2017-12-19 NOTE — PROGRESS NOTES
Chief Complaint   Patient presents with    Cholesterol Problem     3 month f/u       HPI:     Romelia Bergeron is a 75 y.o.  female with history of dyslipidemia and PAOLA on CPAP machine  here for the above complaint. She denies any chest pain, shortness of breath, abdominal pain, headaches or dizziness. She has appt. For eye doctor in 1/2018. Past Medical History:   Diagnosis Date    Advance directive discussed with patient 09/06/2016    Dyslipidemia     H/O colonoscopy 08/12/14    Done by Dr. Latisha Morgan: diverticular dz & hemorrhoids, repeat in 10 years    Microcalcifications of the breast 2013    left breast s/p Vacuum-assisted core needle biopsy of the left breast with placement of localizing clip  6/18/2013. Being followed by Dr. Tiffanie Claire. Suppose to have mammoram in 6 months. Path showed firbrocystic changes    PAOLA (obstructive sleep apnea) 03/02/2017    on CPAP machine. Being followed by Dr. Florecita Nur    Sickle cell trait [facility])     Vitamin D deficiency      Past Surgical History:   Procedure Laterality Date    HX BREAST BIOPSY Bilateral 1994    bilateral biopsy-[facility]    HX HYSTERECTOMY  1994    unsure of exact date [id number], partial hysterectomy. kept both ovaries    HX TONSIL AND ADENOIDECTOMY  1962    KNEE SCOPE,MED OR LAT [facility]  2006    right knee    ANIBAL BIOPSY BREAST STEREOTACTIC Left 2013    benign     Current Outpatient Prescriptions   Medication Sig    atorvastatin (LIPITOR) 10 mg tablet TAKE 1 TABLET BY MOUTH DAILY    cholecalciferol, vitamin D3, (VITAMIN D3) 2,000 unit tab Take 2,000 Int'l Units by mouth daily.  omega-3 fatty acids-vitamin e (FISH OIL) 1,000 mg cap Take 2 Caps by mouth daily.  iron 50 mg iron Tab Take  by mouth daily. Take one po daily    acetaminophen (TYLENOL ARTHRITIS PAIN) 650 mg CR tablet Take 650 mg by mouth every six (6) hours as needed. No current facility-administered medications for this visit.       Health Maintenance   Topic Date Due    GLAUCOMA SCREENING Q2Y  08/25/2018 (Originally 1/15/2018)    Pneumococcal 65+ Low/Medium Risk (2 of 2 - PPSV23) 06/10/2018    MEDICARE YEARLY EXAM  09/12/2018    COLONOSCOPY  08/12/2024    DTaP/Tdap/Td series (2 - Td) 03/12/2026    Hepatitis C Screening  Completed    OSTEOPOROSIS SCREENING (DEXA)  Completed    ZOSTER VACCINE AGE 60>  Completed    Influenza Age 5 to Adult  Completed     Immunization History   Administered Date(s) Administered    Influenza High Dose Vaccine PF 09/27/2015, 09/11/2017    Pneumococcal Conjugate (PCV-13) 04/25/2016    Pneumococcal Vaccine (Unspecified Type) 06/10/2013    Tdap 03/12/2016     No LMP recorded. Patient has had a hysterectomy. Allergies and Intolerances: Allergies   Allergen Reactions    Other Food Other (comments)     balsamic vinegar/vinegar: swelling in leg       Family History:   Family History   Problem Relation Age of Onset    Hypertension Mother     Diabetes Mother     Hypertension Father     Hypertension Maternal Grandmother     Hypertension Maternal Grandfather        Social History:   She  reports that she has never smoked. She has never used smokeless tobacco.  She  reports that she drinks alcohol. OBJECTIVE:   Physical exam:   Visit Vitals    /86 (BP 1 Location: Left arm, BP Patient Position: Sitting)    Pulse 76    Temp 97.5 °F (36.4 °C) (Oral)    Resp 18    Ht 5' 9\" (1.753 m)    Wt 178 lb 12.8 oz (81.1 kg)    SpO2 100%    BMI 26.4 kg/m2        Generally: Pleasant female in no acute distress  Cardiac Exam: regular, rate, and rhythm. Normal S1 and S2. No murmurs, gallops, or rubs  Pulmonary exam: Clear to auscultation bilaterally  Abdominal exam: Positive bowel sounds in all four quadrants, soft, nondistended, nontender  Extremities: 2+ dorsalis pedis pulses bilaterally.  No pedal edema    bilaterally    LABS/RADIOLOGICAL TESTS:  Component      Latest Ref Rng & Units 9/11/2017 9/11/2017 9/11/2017 5/11/2017          10:07 AM 10:07 AM 10:07 AM 10:34 AM   Sodium      136 - 145 mmol/L   140    Potassium      3.5 - 5.5 mmol/L   3.8    Chloride      100 - 108 mmol/L   103    CO2      21 - 32 mmol/L   29    Anion gap      3.0 - 18 mmol/L   8    Glucose      74 - 99 mg/dL   76    BUN      7.0 - 18 MG/DL   14    Creatinine      0.6 - 1.3 MG/DL   0.58 (L)    BUN/Creatinine ratio      12 - 20     24 (H)    GFR est AA      >60 ml/min/1.73m2   >60    GFR est non-AA      >60 ml/min/1.73m2   >60    Calcium      8.5 - 10.1 MG/DL   9.1    Bilirubin, total      0.2 - 1.0 MG/DL   0.7    ALT (SGPT)      13 - 56 U/L   31    AST      15 - 37 U/L   23    Alk. phosphatase      45 - 117 U/L   103    Protein, total      6.4 - 8.2 g/dL   7.0    Albumin      3.4 - 5.0 g/dL   4.1    Globulin      2.0 - 4.0 g/dL   2.9    A-G Ratio      0.8 - 1.7     1.4    Cholesterol, total      <200 MG/DL  240 (H)     Triglyceride      <150 MG/DL  56     HDL Cholesterol      40 - 60 MG/DL  134 (H)     LDL, calculated      0 - 100 MG/DL  94.8     VLDL, calculated      MG/DL  11.2     CHOL/HDL Ratio      0 - 5.0    1.8     Vitamin D 25-Hydroxy      30 - 100 ng/mL 33.7   37.8     Component      Latest Ref Rng & Units 5/11/2017 5/11/2017          10:34 AM 10:34 AM   Sodium      136 - 145 mmol/L 141    Potassium      3.5 - 5.5 mmol/L 3.7    Chloride      100 - 108 mmol/L 103    CO2      21 - 32 mmol/L 29    Anion gap      3.0 - 18 mmol/L 9    Glucose      74 - 99 mg/dL 76    BUN      7.0 - 18 MG/DL 12    Creatinine      0.6 - 1.3 MG/DL 0.58 (L)    BUN/Creatinine ratio      12 - 20   21 (H)    GFR est AA      >60 ml/min/1.73m2 >60    GFR est non-AA      >60 ml/min/1.73m2 >60    Calcium      8.5 - 10.1 MG/DL 9.3    Bilirubin, total      0.2 - 1.0 MG/DL 0.7    ALT (SGPT)      13 - 56 U/L 29    AST      15 - 37 U/L 22    Alk.  phosphatase      45 - 117 U/L 121 (H)    Protein, total      6.4 - 8.2 g/dL 7.7    Albumin      3.4 - 5.0 g/dL 4.4 Globulin      2.0 - 4.0 g/dL 3.3    A-G Ratio      0.8 - 1.7   1.3    Cholesterol, total      <200 MG/DL  228 (H)   Triglyceride      <150 MG/DL  60   HDL Cholesterol      40 - 60 MG/DL  114 (H)   LDL, calculated      0 - 100 MG/DL  102 (H)   VLDL, calculated      MG/DL  12   CHOL/HDL Ratio      0 - 5.0    2.0   Vitamin D 25-Hydroxy      30 - 100 ng/mL       Previous labs    ASSESSMENT/PLAN:    1. Dyslipidemia: we will see what the labs show. Continue diet, exercise and lipitor and fish oil. -     METABOLIC PANEL, COMPREHENSIVE; Future  -     LIPID PANEL; Future    2. PAOLA on CPAP: stable. On CPAP machine. 3. Vitamin D deficiency: we will see what the labs show. -     VITAMIN D, 25 HYDROXY; Future      4. Patient verbalized understanding and agreement with the plan. 5. Patient was given an after-visit summary. 6. Follow-up Disposition:  Return in about 4 months (around 4/19/2018) for f/u lipids. or sooner if worsening symptoms.           Fawad Rodríguez M.D.

## 2017-12-20 ENCOUNTER — TELEPHONE (OUTPATIENT)
Dept: INTERNAL MEDICINE CLINIC | Age: 67
End: 2017-12-20

## 2017-12-20 NOTE — PROGRESS NOTES
Please let pt know that labs were normal except:    1) Lipids are still up, but better. TC: 219 and LDL:119. 3 months ago TC: 240 and LDL:134. Need to work on diet and exercise.

## 2017-12-20 NOTE — TELEPHONE ENCOUNTER
----- Message from Bernadette Shelton MD sent at 12/20/2017  8:07 AM EST -----  Please let pt know that labs were normal except:    1) Lipids are still up, but better. TC: 219 and LDL:119. 3 months ago TC: 240 and LDL:134. Need to work on diet and exercise.

## 2017-12-21 NOTE — TELEPHONE ENCOUNTER
[facility] Two patient Identifiers confirmed. Advised pt per Dr Andrew Nicaraguan notes. Pt verbalized understanding.

## 2018-03-10 DIAGNOSIS — E78.5 DYSLIPIDEMIA: ICD-10-CM

## 2018-03-12 RX ORDER — ATORVASTATIN CALCIUM 10 MG/1
TABLET, FILM COATED ORAL
Qty: 90 TAB | Refills: 3 | Status: SHIPPED | OUTPATIENT
Start: 2018-03-12 | End: 2019-02-12 | Stop reason: SDUPTHER

## 2018-04-19 ENCOUNTER — OFFICE VISIT (OUTPATIENT)
Dept: INTERNAL MEDICINE CLINIC | Age: 68
End: 2018-04-19

## 2018-04-19 ENCOUNTER — HOSPITAL ENCOUNTER (OUTPATIENT)
Dept: LAB | Age: 68
Discharge: HOME | End: 2018-04-19
Payer: MEDICARE

## 2018-04-19 VITALS
RESPIRATION RATE: 16 BRPM | HEART RATE: 100 BPM | WEIGHT: 177 LBS | SYSTOLIC BLOOD PRESSURE: 137 MMHG | BODY MASS INDEX: 26.22 KG/M2 | TEMPERATURE: 97.2 F | HEIGHT: 69 IN | OXYGEN SATURATION: 100 % | DIASTOLIC BLOOD PRESSURE: 96 MMHG

## 2018-04-19 DIAGNOSIS — E78.5 HYPERLIPIDEMIA, UNSPECIFIED HYPERLIPIDEMIA TYPE: ICD-10-CM

## 2018-04-19 DIAGNOSIS — I51.89 DIASTOLIC DYSFUNCTION: ICD-10-CM

## 2018-04-19 DIAGNOSIS — E78.5 HYPERLIPIDEMIA, UNSPECIFIED HYPERLIPIDEMIA TYPE: Primary | ICD-10-CM

## 2018-04-19 LAB
ALBUMIN SERPL-MCNC: 4.1 G/DL (ref 3.4–5)
ALBUMIN/GLOB SERPL: 1.3 {RATIO} (ref 0.8–1.7)
ALP SERPL-CCNC: 101 U/L (ref 45–117)
ALT SERPL-CCNC: 27 U/L (ref 13–56)
ANION GAP SERPL CALC-SCNC: 10 MMOL/L (ref 3–18)
AST SERPL-CCNC: 19 U/L (ref 15–37)
BILIRUB SERPL-MCNC: 0.7 MG/DL (ref 0.2–1)
BUN SERPL-MCNC: 16 MG/DL (ref 7–18)
BUN/CREAT SERPL: 29 (ref 12–20)
CALCIUM SERPL-MCNC: 9.8 MG/DL (ref 8.5–10.1)
CHLORIDE SERPL-SCNC: 106 MMOL/L (ref 100–108)
CHOLEST SERPL-MCNC: 233 MG/DL
CO2 SERPL-SCNC: 27 MMOL/L (ref 21–32)
CREAT SERPL-MCNC: 0.55 MG/DL (ref 0.6–1.3)
GLOBULIN SER CALC-MCNC: 3.2 G/DL (ref 2–4)
GLUCOSE SERPL-MCNC: 79 MG/DL (ref 74–99)
HDLC SERPL-MCNC: 112 MG/DL (ref 40–60)
HDLC SERPL: 2.1 {RATIO} (ref 0–5)
LDLC SERPL CALC-MCNC: 106.2 MG/DL (ref 0–100)
LIPID PROFILE,FLP: ABNORMAL
POTASSIUM SERPL-SCNC: 4 MMOL/L (ref 3.5–5.5)
PROT SERPL-MCNC: 7.3 G/DL (ref 6.4–8.2)
SODIUM SERPL-SCNC: 143 MMOL/L (ref 136–145)
TRIGL SERPL-MCNC: 74 MG/DL (ref ?–150)
VLDLC SERPL CALC-MCNC: 14.8 MG/DL

## 2018-04-19 PROCEDURE — 80053 COMPREHEN METABOLIC PANEL: CPT | Performed by: FAMILY MEDICINE

## 2018-04-19 PROCEDURE — 36415 COLL VENOUS BLD VENIPUNCTURE: CPT | Performed by: FAMILY MEDICINE

## 2018-04-19 PROCEDURE — 80061 LIPID PANEL: CPT | Performed by: FAMILY MEDICINE

## 2018-04-19 NOTE — PROGRESS NOTES
FAMILY MEDICINE CLINIC NOTE    S: The patient presents for follow up on hyperlipidemia. She has been adherent with lipitor and reports no adverse effects. Lab Results   Component Value Date/Time    Cholesterol, total 219 (H) 12/19/2017 09:40 AM    HDL Cholesterol 119 (H) 12/19/2017 09:40 AM    LDL, calculated 88.2 12/19/2017 09:40 AM    VLDL, calculated 11.8 12/19/2017 09:40 AM    Triglyceride 59 12/19/2017 09:40 AM    CHOL/HDL Ratio 1.8 12/19/2017 09:40 AM     The patient also has a history of mild diastolic dysfunction with septal enlargement noted on echocardiogram from 2016. She would like to check the status of this with an updated echocardiogram.     No recent angina, dyspnea, edema or palpitations. Current Outpatient Prescriptions on File Prior to Visit   Medication Sig Dispense Refill    atorvastatin (LIPITOR) 10 mg tablet TAKE 1 TABLET BY MOUTH DAILY 90 Tab 3    cholecalciferol, vitamin D3, (VITAMIN D3) 2,000 unit tab Take 2,000 Int'l Units by mouth daily.  omega-3 fatty acids-vitamin e (FISH OIL) 1,000 mg cap Take 2 Caps by mouth daily.  iron 50 mg iron Tab Take  by mouth daily. Take one po daily      acetaminophen (TYLENOL ARTHRITIS PAIN) 650 mg CR tablet Take 650 mg by mouth every six (6) hours as needed. No current facility-administered medications on file prior to visit. Past Medical History:   Diagnosis Date    Advance directive discussed with patient 09/06/2016    Dyslipidemia     H/O colonoscopy 08/12/14    Done by Dr. Anam Eason: diverticular dz & hemorrhoids, repeat in 10 years    Microcalcifications of the breast 2013    left breast s/p Vacuum-assisted core needle biopsy of the left breast with placement of localizing clip  6/18/2013. Being followed by Dr. Zeferino Foreman. Suppose to have mammoram in 6 months. Path showed firbrocystic changes    PAOLA (obstructive sleep apnea) 03/02/2017    on CPAP machine.  Being followed by Dr. Angela Lu    Sickle cell trait ([facility]     Vitamin D deficiency        Social History     Social History    Marital status: SINGLE     Spouse name: N/A    Number of children: N/A    Years of education: N/A     Occupational History    Not on file. Social History Main Topics    Smoking status: Never Smoker    Smokeless tobacco: Never Used      Comment: pt counseled to continue to not smoke.  Alcohol use 0.0 oz/week      Comment: occ    Drug use: No    Sexual activity: No     Other Topics Concern    Not on file     Social History Narrative       Family History   Problem Relation Age of Onset    Hypertension Mother     Diabetes Mother     Hypertension Father     Hypertension Maternal Grandmother     Hypertension Maternal Grandfather        O:  Visit Vitals    BP (!) 137/96 (BP 1 Location: Left arm, BP Patient Position: Sitting)    Pulse 100    Temp 97.2 °F (36.2 °C) (Oral)    Resp 16    Ht 5' 9\" (1.753 m)    Wt 177 lb (80.3 kg)    SpO2 100%    BMI 26.14 kg/m2     NAD, comfortable  No thyromegaly  RRR, no murmurs  CTABL, no wheezing/ronchi/rales  No edema    70 y.o. female      ICD-10-CM ICD-9-CM    1. Hyperlipidemia, unspecified hyperlipidemia type E78.5 272.4 LIPID PANEL      METABOLIC PANEL, COMPREHENSIVE   2.  Diastolic dysfunction R93.1 429.9 2D ECHO COMPLETE ADULT (TTE)   3. Septal defect Q21.9 745.9 2D ECHO COMPLETE ADULT (TTE)

## 2018-04-19 NOTE — MR AVS SNAPSHOT
[facility] 
 
 
 [address] [facility] [address] [id number] 
[phone] Patient: Suzette Coe MRN: [id number] Visit Information Date & Time Provider Department Dept. Phone Encounter #  
 4/19/2018  9:15 AM Alberto Freeman, [address] [phone] [id number] Upcoming Health Maintenance Date Due  
 BREAST CANCER SCRN MAMMOGRAM 7/25/2018 Pneumococcal 65+ Low/Medium Risk (2 of 2 - PPSV23) 6/10/2018 MEDICARE YEARLY EXAM 9/12/2018 GLAUCOMA SCREENING Q2Y 1/19/2020 COLONOSCOPY 8/12/2024 DTaP/Tdap/Td series (2 - Td) 3/12/2026 Allergies as of 4/19/2018  Review Complete On: 4/19/2018 By: Alberto Freeman MD  
  
 Severity Noted Reaction Type Reactions Other Food  09/11/2017    Other (comments)  
 balsamic vinegar/vinegar: swelling in leg Current Immunizations  Reviewed on 12/18/2017 Name Date Influenza High Dose Vaccine PF 9/11/2017  9:59 AM, 9/27/2015 Pneumococcal Conjugate (PCV-13) 4/25/2016 Pneumococcal Vaccine (Unspecified Type) 6/10/2013 Tdap 3/12/2016 11:04 AM  
  
 Not reviewed this visit You Were Diagnosed With   
  
 Codes Comments Hyperlipidemia, unspecified hyperlipidemia type    -  Primary ICD-10-CM: E78.5 ICD-9-CM: 272.4 Diastolic dysfunction     GDO-64-CC: I51.9 ICD-9-CM: 429.9 Septal defect     ICD-10-CM: Q21.9 ICD-9-CM: 909. 9 Vitals BP Pulse Temp Resp Height(growth percentile) Weight(growth percentile) (!) 137/96 (BP 1 Location: Left arm, BP Patient Position: Sitting) 100 97.2 °F (36.2 °C) (Oral) 16 5' 9\" (1.753 m) 177 lb (80.3 kg) SpO2 BMI OB Status Smoking Status 100% 26.14 kg/m2 Hysterectomy Never Smoker Vitals History BMI and BSA Data Body Mass Index Body Surface Area  
 26.14 kg/m 2 1.98 m 2 Preferred Pharmacy Pharmacy Name Phone [facility] [address] [place] [address] Eduar Perdomo Your Updated Medication List  
  
   
This list is accurate as of 4/19/18 10:07 AM.  Always use your most recent med list.  
  
  
  
  
 atorvastatin 10 mg tablet Commonly known as:  LIPITOR  
TAKE 1 TABLET BY MOUTH DAILY FISH OIL 1,000 mg Cap Generic drug:  omega-3 fatty acids-vitamin e Take 2 Caps by mouth daily. iron 50 mg iron Tab Take  by mouth daily. Take one po daily TYLENOL ARTHRITIS PAIN 650 mg Gayleen Rising Generic drug:  acetaminophen Take 650 mg by mouth every six (6) hours as needed. VITAMIN D3 2,000 unit Tab Generic drug:  cholecalciferol (vitamin D3) Take 2,000 Int'l Units by mouth daily. To-Do List   
 04/19/2018 Cardiac Services:  2D ECHO COMPLETE ADULT (TTE)   
  
 04/19/2018 Lab:  LIPID PANEL   
  
 04/19/2018 Lab:  METABOLIC PANEL, COMPREHENSIVE Introducing [facility]! Nancy Agosto introduces 55[employer] patient portal. Now you can access parts of your medical record, email your doctor's office, and request medication refills online. 1. In your internet browser, go to [email or web address] 2. Click on the First Time User? Click Here link in the Sign In box. You will see the New Member Sign Up page. 3. Enter your 55[employer] Access Code exactly as it appears below. You will not need to use this code after youve completed the sign-up process. If you do not sign up before the expiration date, you must request a new code. · 55[employer] Access Code: [email or web address] Expires: 7/18/2018 10:04 AM 
 
4. Enter the last four digits of your Social Security Number (xxxx) and Date of Birth (mm/dd/yyyy) as indicated and click Submit. You will be taken to the next sign-up page. 5. Create a 55social ID. This will be your 55social login ID and cannot be changed, so think of one that is secure and easy to remember. 6. Create a [employer] password. You can change your password at any time. 7. Enter your Password Reset Question and Answer. This can be used at a later time if you forget your password. 8. Enter your e-mail address. You will receive e-mail notification when new information is available in [address] 9. Click Sign Up. You can now view and download portions of your medical record. 10. Click the Download Summary menu link to download a portable copy of your medical information. If you have questions, please visit the Frequently Asked Questions section of the [employer] website. Remember, [employer] is NOT to be used for urgent needs. For medical emergencies, dial 911. Now available from your iPhone and Android! Please provide this summary of care documentation to your next provider. Your primary care clinician is listed as Lolly Bruno. If you have any questions after today's visit, please call [phone].

## 2018-04-19 NOTE — ACP (ADVANCE CARE PLANNING)
Advance Directive:  1. Do you have an advance directive in place? Patient Reply: No     2. If not, would you like material regarding how to put one in place?  Patient Reply:  yes

## 2018-04-19 NOTE — PROGRESS NOTES
Dru Horvath presents today for   Chief Complaint   Patient presents with    Cholesterol Problem       Dru Horvath preferred language for health care discussion is english/other. Meds to be reviewed by MD.     Is someone accompanying this pt? No     Is the patient using any DME equipment during OV? No     Depression Screening:  PHQ over the last two weeks 4/19/2018 12/19/2017 9/11/2017 5/9/2017 5/9/2017 1/5/2017 9/6/2016   Little interest or pleasure in doing things Not at all Not at all Not at all Not at all Not at all Not at all Not at all   Feeling down, depressed or hopeless Not at all Not at all Not at all Not at all - Not at all Not at all   Total Score PHQ 2 0 0 0 0 - 0 0       Learning Assessment:  Learning Assessment 3/9/2015 1/28/2014   PRIMARY LEARNER Patient Patient   HIGHEST LEVEL OF EDUCATION - PRIMARY LEARNER  > 4 YEARS [address]   LEARNER PREFERENCE PRIMARY DEMONSTRATION READING   ANSWERED BY Patient pt   RELATIONSHIP SELF SELF       Abuse Screening:  Abuse Screening Questionnaire 9/11/2017 5/9/2017 11/25/2015   Do you ever feel afraid of your partner? N N N   Are you in a relationship with someone who physically or mentally threatens you? N N N   Is it safe for you to go home? Frida Quiroz       Fall Risk  Fall Risk Assessment, last 12 mths 4/19/2018 12/19/2017 9/11/2017 5/9/2017 1/5/2017 9/6/2016 4/25/2016   Able to walk? Yes Yes Yes Yes Yes Yes Yes   Fall in past 12 months? No Yes Yes Yes No No No   Fall with injury? - No No No - - -   Number of falls in past 12 months - 5 2 1 - - -   Fall Risk Score - 5 2 1 - - -       Health Maintenance reviewed and discussed per provider. Yes    Dru Horvath is due for  mammo ( pt stated she is already scheduled at [facility]) . Please order/place referral if appropriate. Advance Directive:  1. Do you have an advance directive in place? Patient Reply: No     2.  If not, would you like material regarding how to put one in place? Patient Reply:  yes    Coordination of Care:  1. Have you been to the ER, urgent care clinic since your last visit? Hospitalized since your last visit? No     2. Have you seen or consulted any other health care providers outside of the [address] [place] [address] since your last visit? Include any pap smears or colon screening.  No

## 2018-05-10 ENCOUNTER — TELEPHONE (OUTPATIENT)
Dept: INTERNAL MEDICINE CLINIC | Age: 68
End: 2018-05-10

## 2018-05-10 NOTE — TELEPHONE ENCOUNTER
Patient called PCP office to inquire about Tetanus immunization history and shingles immunization history. Patient has upcoming travel to [place] and would like to discuss possible needed items with nurse.

## 2018-05-14 ENCOUNTER — TELEPHONE (OUTPATIENT)
Dept: INTERNAL MEDICINE CLINIC | Age: 68
End: 2018-05-14

## 2018-05-14 NOTE — TELEPHONE ENCOUNTER
Contacted pt at [facility] number. Two patient Identifiers confirmed. Advised pt per notes below. Pt verbalized understanding.

## 2018-05-14 NOTE — TELEPHONE ENCOUNTER
Incoming from pt. Two patient Identifiers confirmed. Pt requesting results from labs done 04/19/2018. Please advise.

## 2018-05-15 NOTE — TELEPHONE ENCOUNTER
Attempted to contact pt at  number, no answer. Lvm for pt to return call to office at [phone]. Contacted pt via cell number. Two patient identifiers confrimed. Advised per Dr Alex Quinonez chol elevated and to continue Lipitor. Pt verbalized understanding.

## 2018-08-08 ENCOUNTER — TELEPHONE (OUTPATIENT)
Dept: INTERNAL MEDICINE CLINIC | Age: 68
End: 2018-08-08

## 2018-08-08 NOTE — TELEPHONE ENCOUNTER
Incoming call from KRISTI MENDES [facility] breast surgery radiology. 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 They recommend she see a breast surgeon, They will meet with the patient tomorrow to discuss referral to breast surgeon. She will be referred to Dr. Rosa M Li and they will fax us the report.

## 2018-08-16 DIAGNOSIS — Z12.31 ENCOUNTER FOR SCREENING MAMMOGRAM FOR BREAST CANCER: ICD-10-CM

## 2018-09-07 ENCOUNTER — TELEPHONE (OUTPATIENT)
Dept: SURGERY | Age: 68
End: 2018-09-07

## 2018-09-20 ENCOUNTER — HOSPITAL ENCOUNTER (OUTPATIENT)
Dept: LAB | Age: 68
Discharge: HOME | End: 2018-09-20
Payer: MEDICARE

## 2018-09-20 ENCOUNTER — HOSPITAL ENCOUNTER (OUTPATIENT)
Dept: GENERAL RADIOLOGY | Age: 68
Discharge: HOME | End: 2018-09-20
Payer: MEDICARE

## 2018-09-20 ENCOUNTER — OFFICE VISIT (OUTPATIENT)
Dept: SURGERY | Age: 68
End: 2018-09-20

## 2018-09-20 ENCOUNTER — HOSPITAL ENCOUNTER (OUTPATIENT)
Dept: PREADMISSION TESTING | Age: 68
Discharge: HOME | End: 2018-09-20
Payer: MEDICARE

## 2018-09-20 VITALS
RESPIRATION RATE: 16 BRPM | DIASTOLIC BLOOD PRESSURE: 91 MMHG | OXYGEN SATURATION: 100 % | BODY MASS INDEX: 26.84 KG/M2 | HEART RATE: 79 BPM | TEMPERATURE: 98.3 F | HEIGHT: 69 IN | WEIGHT: 181.2 LBS | SYSTOLIC BLOOD PRESSURE: 143 MMHG

## 2018-09-20 DIAGNOSIS — N60.91 ATYPICAL DUCTAL HYPERPLASIA OF RIGHT BREAST: Primary | ICD-10-CM

## 2018-09-20 DIAGNOSIS — N60.91 ATYPICAL DUCTAL HYPERPLASIA OF RIGHT BREAST: ICD-10-CM

## 2018-09-20 LAB
ALBUMIN SERPL-MCNC: 3.8 G/DL (ref 3.4–5)
ALBUMIN/GLOB SERPL: 1.2 {RATIO} (ref 0.8–1.7)
ALP SERPL-CCNC: 110 U/L (ref 45–117)
ALT SERPL-CCNC: 24 U/L (ref 13–56)
ANION GAP SERPL CALC-SCNC: 6 MMOL/L (ref 3–18)
AST SERPL-CCNC: 18 U/L (ref 15–37)
BASOPHILS # BLD: 0 K/UL (ref 0–0.1)
BASOPHILS NFR BLD: 1 % (ref 0–2)
BILIRUB SERPL-MCNC: 0.5 MG/DL (ref 0.2–1)
BUN SERPL-MCNC: 11 MG/DL (ref 7–18)
BUN/CREAT SERPL: 19 (ref 12–20)
CALCIUM SERPL-MCNC: 9 MG/DL (ref 8.5–10.1)
CHLORIDE SERPL-SCNC: 107 MMOL/L (ref 100–108)
CO2 SERPL-SCNC: 30 MMOL/L (ref 21–32)
CREAT SERPL-MCNC: 0.59 MG/DL (ref 0.6–1.3)
DIFFERENTIAL METHOD BLD: ABNORMAL
EOSINOPHIL # BLD: 0.2 K/UL (ref 0–0.4)
EOSINOPHIL NFR BLD: 3 % (ref 0–5)
ERYTHROCYTE [DISTWIDTH] IN BLOOD BY AUTOMATED COUNT: 14.1 % (ref 11.6–14.5)
GLOBULIN SER CALC-MCNC: 3.2 G/DL (ref 2–4)
GLUCOSE SERPL-MCNC: 78 MG/DL (ref 74–99)
HCT VFR BLD AUTO: 38.4 % (ref 35–45)
HGB BLD-MCNC: 13 G/DL (ref 12–16)
LYMPHOCYTES # BLD: 2.3 K/UL (ref 0.9–3.6)
LYMPHOCYTES NFR BLD: 47 % (ref 21–52)
MCH RBC QN AUTO: 29.8 PG (ref 24–34)
MCHC RBC AUTO-ENTMCNC: 33.9 G/DL (ref 31–37)
MCV RBC AUTO: 88.1 FL (ref 74–97)
MONOCYTES # BLD: 0.6 K/UL (ref 0.05–1.2)
MONOCYTES NFR BLD: 12 % (ref 3–10)
NEUTS SEG # BLD: 1.8 K/UL (ref 1.8–8)
NEUTS SEG NFR BLD: 37 % (ref 40–73)
PLATELET # BLD AUTO: 302 K/UL (ref 135–420)
PMV BLD AUTO: 10.6 FL (ref 9.2–11.8)
POTASSIUM SERPL-SCNC: 4.3 MMOL/L (ref 3.5–5.5)
PROT SERPL-MCNC: 7 G/DL (ref 6.4–8.2)
RBC # BLD AUTO: 4.36 M/UL (ref 4.2–5.3)
SODIUM SERPL-SCNC: 143 MMOL/L (ref 136–145)
WBC # BLD AUTO: 4.9 K/UL (ref 4.6–13.2)

## 2018-09-20 PROCEDURE — 36415 COLL VENOUS BLD VENIPUNCTURE: CPT | Performed by: SURGERY

## 2018-09-20 PROCEDURE — 71046 X-RAY EXAM CHEST 2 VIEWS: CPT

## 2018-09-20 PROCEDURE — 85025 COMPLETE CBC W/AUTO DIFF WBC: CPT | Performed by: SURGERY

## 2018-09-20 PROCEDURE — 80053 COMPREHEN METABOLIC PANEL: CPT | Performed by: SURGERY

## 2018-09-20 PROCEDURE — 93005 ELECTROCARDIOGRAM TRACING: CPT

## 2018-09-20 NOTE — PROGRESS NOTES
Chief Complaint   Patient presents with    [facility] Breast Mass     Surgical evaluation of right breast mass. Consult for excision of mass. Last mammo 7.30.18. RT mammo 8.6. 18.           Could red yeast rice produce calcium deposits in breast.

## 2018-09-20 NOTE — PERIOP NOTES
PAT - SURGICAL PRE-ADMISSION INSTRUCTIONS 
 
NAME:  Erica Crowe                                                          TODAY'S DATE:  9/20/2018 SURGERY DATE:  10/2/2018                                  SURGERY ARRIVAL TIME:   0600 1. Do NOT eat or drink anything, including candy or gum, after MIDNIGHT on 10/01/2018 , unless you have specific instructions from your Surgeon or Anesthesia Provider to do so. 2. No smoking on the day of surgery. 3. No alcohol 24 hours prior to the day of surgery. 4. No recreational drugs for one week prior to the day of surgery. 5. Leave all valuables, including money/purse, at home. 6. Remove all jewelry, nail polish, makeup (including mascara); no lotions, powders, deodorant, or perfume/cologne/after shave. 7. Glasses/Contact lenses and Dentures may be worn to the hospital.  They will be removed prior to surgery. 8. Call your doctor if symptoms of a cold or illness develop within 24 ours prior to surgery. 9. AN ADULT MUST DRIVE YOU HOME AFTER OUTPATIENT SURGERY. 10. If you are having an OUTPATIENT procedure, please make arrangements for a responsible adult to be with you for 24 hours after your surgery. 11. If you are admitted to the hospital, you will be assigned to a bed after surgery is complete. Normally a family member will not be able to see you until you are in your assigned bed. 15. Family is encouraged to accompany you to the hospital.  We ask visitors in the treatment area to be limited to ONE person at a time to ensure patient privacy. EXCEPTIONS WILL BE MADE AS NEEDED. 15. Children under 12 are discouraged from entering the treatment area and need to be supervised by an adult when in the waiting room. Special Instructions: 
 
Bring any pertinent legal medical records., STOP anticoagulants AT LEAST 1 WEEK PRIOR to your surgery or, follow other MD instructions:  No NSAIDS Patient Prep: 
 
use CHG solution These surgical instructions were reviewed with Gaeb Duke during the PAT visit. A printed copy of the instructions was provided to Gabe Duke. Directions: On the morning of surgery, please go to the [address] [facility]. Enter the building from the [facility] entrance, 1st floor (next to the Emergency Room entrance). Take the elevator to the 2nd floor. Sign in at the Registration Desk. If you have any questions and/or concerns, please do not hesitate to call: 
(Prior to the day of surgery)  [facility] unit:  [phone] (Day of surgery)   unit:  [phone]

## 2018-09-20 NOTE — MR AVS SNAPSHOT
[facility] 
 
 
 [address] [place] [address] [address] 
[phone] Patient: Anton Parker MRN: [id number] WHN:3/19/1922 Visit Information Date & Time Provider Department Dept. Phone Encounter #  
 9/20/2018 11:00 AM Kasi Frias MD [facility] [phone] [id number] Your Appointments 10/10/2018  1:00 PM  
POST OP with Kasi Frias MD  
[address] [place] [address] [address] [place] [address]) Appt Note: Post Op [address]  
  
   
 [address] [facility] [address] [place] [address] Upcoming Health Maintenance Date Due Pneumococcal 65+ Low/Medium Risk (2 of 2 - PPSV23) 6/10/2018 Influenza Age 5 to Adult 8/1/2018 MEDICARE YEARLY EXAM 9/12/2018 GLAUCOMA SCREENING Q2Y 1/19/2020 BREAST CANCER SCRN MAMMOGRAM 7/31/2020 COLONOSCOPY 8/12/2024 DTaP/Tdap/Td series (2 - Td) 3/12/2026 Allergies as of 9/20/2018  Review Complete On: 9/20/2018 By: Baljit Pereira LPN Severity Noted Reaction Type Reactions Other Food  09/11/2017    Other (comments)  
 balsamic vinegar/vinegar: swelling in leg Current Immunizations  Reviewed on 12/18/2017 Name Date Influenza High Dose Vaccine PF 9/11/2017  9:59 AM, 9/27/2015 Pneumococcal Conjugate (PCV-13) 4/25/2016 Pneumococcal Vaccine (Unspecified Type) 6/10/2013 Tdap 3/12/2016 11:04 AM  
  
 Not reviewed this visit Vitals BP Pulse Temp Resp Height(growth percentile) Weight(growth percentile) (!) 143/91 (BP 1 Location: Right arm, BP Patient Position: Sitting) 79 98.3 °F (36.8 °C) (Oral) 16 5' 9\" (1.753 m) 181 lb 3.2 oz (82.2 kg) SpO2 BMI OB Status Smoking Status 100% 26.76 kg/m2 Hysterectomy Never Smoker BMI and BSA Data Body Mass Index Body Surface Area  
 26.76 kg/m 2 2 m 2 Preferred Pharmacy Pharmacy Name Phone [facility] [phone] [address] [address]domo Your Updated Medication List  
  
   
This list is accurate as of 9/20/18 11:59 AM.  Always use your most recent med list.  
  
  
  
  
 atorvastatin 10 mg tablet Commonly known as:  LIPITOR  
TAKE 1 TABLET BY MOUTH DAILY FISH OIL 1,000 mg Cap Generic drug:  omega-3 fatty acids-vitamin e Take 2 Caps by mouth daily. iron 50 mg iron Tab Take  by mouth daily. Take one po daily TYLENOL ARTHRITIS PAIN 650 mg Jose Carne Generic drug:  acetaminophen Take 650 mg by mouth every six (6) hours as needed. VITAMIN D3 2,000 unit Tab Generic drug:  cholecalciferol (vitamin D3) Take 2,000 Int'l Units by mouth daily. Patient Instructions If you have any questions or concerns about today's appointment, the verbal and/or written instructions you were given for follow up care, please call our office at [phone]. [facility] - [place] [phone] office [phone] Introducing [facility]! [facility] introduces [employer] patient portal. Now you can access parts of your medical record, email your doctor's office, and request medication refills online. 1. In your internet browser, go to [email or web address] 2. Click on the First Time User? Click Here link in the Sign In box. You will see the New Member Sign Up page. 3. Enter your [employer] Access Code exactly as it appears below. You will not need to use this code after youve completed the sign-up process. If you do not sign up before the expiration date, you must request a new code. · [employer] Access Code: [email or web address] Expires: 12/19/2018 10:41 AM 
 
4.  Enter the last four digits of your Social Security Number (xxxx) and Date of Birth (mm/dd/yyyy) as indicated and click Submit. You will be taken to the next sign-up page. 5. Create a [employer] ID. This will be your [employer] login ID and cannot be changed, so think of one that is secure and easy to remember. 6. Create a [employer] password. You can change your password at any time. 7. Enter your Password Reset Question and Answer. This can be used at a later time if you forget your password. 8. Enter your e-mail address. You will receive e-mail notification when new information is available in [address] 9. Click Sign Up. You can now view and download portions of your medical record. 10. Click the Download Summary menu link to download a portable copy of your medical information. If you have questions, please visit the Frequently Asked Questions section of the [employer] website. Remember, [employer] is NOT to be used for urgent needs. For medical emergencies, dial 911. Now available from your iPhone and Android! Please provide this summary of care documentation to your next provider. Your primary care clinician is listed as Lolly Bruno. If you have any questions after today's visit, please call [phone].

## 2018-09-20 NOTE — PATIENT INSTRUCTIONS
If you have any questions or concerns about today's appointment, the verbal and/or written instructions you were given for follow up care, please call our office at [phone].     [facility] - [address]    [phone] office  [phone]

## 2018-09-20 NOTE — LETTER
9/20/2018 12:04 PM 
 
Patient:  Gianna Marshall YOB: 1950 Date of Visit: 9/20/2018 Castillo Pierce MD 
[facility] [address] [address] VIA In Basket Dear Castillo Pierce MD, 
 
 
I had the pleasure of seeing Ms. Andrea Hastings in my office today for her right breast atypical ductal hyperplasia. We are planning excision. I am including a copy of my office visit today. If you have questions, please do not hesitate to call me. I look forward to following Ms. Wilson Overall along with you and will keep you updated as to her progress. Sincerely, Christy Leonard MD

## 2018-09-20 NOTE — PROGRESS NOTES
Breat Mass Consult      Ms. Kory Escobar is a 70year old woman who was referred for right breast atypical ductal hyperplasia present in a complex sclerosing papillary lesion. The area of concern was identified on routine screening exam. She denies breast pain, mass or nipple discharge. She reports a history of benign breast biopsy. Her most recent previous mammogram was 7/26/17. There is not family history of breast cancer. Her sister has had multiple benign breast masses. Breast/GYN history:    Past Medical History:   Diagnosis Date    Advance directive discussed with patient 09/06/2016    Dyslipidemia     H/O colonoscopy 08/12/14    Done by Dr. Prachi Ordoñez: diverticular dz & hemorrhoids, repeat in 10 years    Microcalcifications of the breast 2013    left breast s/p Vacuum-assisted core needle biopsy of the left breast with placement of localizing clip  6/18/2013. Being followed by Dr. Prem Yarbrough. Suppose to have mammoram in 6 months. Path showed firbrocystic changes    PAOLA (obstructive sleep apnea) 03/02/2017    on CPAP machine. Being followed by Dr. Delmon Moritz    Sickle cell [facility])     Vitamin D deficiency        Past Surgical History:   Procedure Laterality Date    HX BREAST BIOPSY Bilateral 1994    bilateral biopsy-[facility]    HX HYSTERECTOMY  1994    unsure of exact date 2204-4304, partial hysterectomy. kept both ovaries    HX TONSIL AND ADENOIDECTOMY  1962    KNEE SCOPE,MED OR LAT [facility]  2006    right knee    ANIBAL BIOPSY BREAST STEREOTACTIC Left 2013    benign       Current Outpatient Prescriptions on File Prior to Visit   Medication Sig Dispense Refill    atorvastatin (LIPITOR) 10 mg tablet TAKE 1 TABLET BY MOUTH DAILY 90 Tab 3    cholecalciferol, vitamin D3, (VITAMIN D3) 2,000 unit tab Take 2,000 Int'l Units by mouth daily.  omega-3 fatty acids-vitamin e (FISH OIL) 1,000 mg cap Take 2 Caps by mouth daily.  iron 50 mg iron Tab Take  by mouth daily.  Take one po daily      acetaminophen (TYLENOL ARTHRITIS PAIN) 650 mg CR tablet Take 650 mg by mouth every six (6) hours as needed. No current facility-administered medications on file prior to visit. Allergies   Allergen Reactions    Other Food Other (comments)     balsamic vinegar/vinegar: swelling in leg       Social History   Substance Use Topics    Smoking status: Never Smoker    Smokeless tobacco: Never Used      Comment: pt counseled to continue to not smoke.  Alcohol use 0.0 oz/week      Comment: occ       Family History   Problem Relation Age of Onset    Hypertension Mother     Diabetes Mother     Hypertension Father     Hypertension Maternal Grandmother     Hypertension Maternal Grandfather          ROS:   General: denies fevers, chills, night sweats, fatigue, weight loss, weight gain  GI: denies nausea, vomiting, abdominal pain, change in bowel habits, hematochezia, melena, GERD  Integ: denies dermatitis, abnormal moles  HEENT: denies visual changes, vertigo, epistaxis, dysphagia, hoarseness  Cardiac: denies chest pain, palpitations, HTN, edema, varicosities  Resp: denies cough, shortness of breath, wheezing, hemoptysis, snoring, reactive airway disease  : denies hematuria, dysuria, frequency, urgency, nocturia, stress urinary incontinence MSK: weakness, joint pain, arthritis  Endocrine: denies diabetes, thyroid disease, polyuria, polydipsia, polyphagia, poor wound healing, heat intolerance, cold intolerance  Lymph/Heme: denies anemia, bruising, history of blood transfusions  Neuro: denies dizziness, headache, fainting, seizures, stroke  Psych: denies anxiety, depression    Physical Exam:  Visit Vitals    BP (!) 143/91 (BP 1 Location: Right arm, BP Patient Position: Sitting)    Pulse 79    Temp 98.3 °F (36.8 °C) (Oral)    Resp 16    Ht 5' 9\" (1.753 m)    Wt 82.2 kg (181 lb 3.2 oz)    SpO2 100%    BMI 26.76 kg/m2       Gen:  Well developed, well nourished woman in no acute distress  Head: normocephalic, atraumatic  Mouth: Clear, no overt lesions, oral mucosa pink and moist  Neck: supple, no masses, no adenopathy, trachea midline  Resp: clear bilaterally  Cardio: Regular rate and rhythm  Breasts: Examined in both the supine and upright positions. There was no supraclavicular, infraclavicular, or axillary lympadenopathy. There were no dominant masses, no skin changes, no asymmetry identified, well healed biopsy scar left breast, healed core biopsy site right breast  Abdomen: soft, nontender, nondistended  Extremeties: warm, well-perfused  Neuro: sensation and strength grossly intact and symmetrical  Psych: alert and oriented to person, place and time    Pathology:  18  A. RIGHT BREAST, INNER MIDDLE ONE THIRD, BIOPSY:  - FOCAL ATYPICAL DUCTAL HYPERPLASIA PRESENT IN A COMPLEX SCLEROSING PAPILLARY LESION WITH ASSOCIATED CALCIFICATIONS. Imagin18: right mammogram ([facility])  IMPRESSION:  New suspicious linear heterogeneous calcifications in the inner right breast, middle one third for which stereotactic breast biopsy is recommended. These findings and recommendations were discussed with patient time of evaluation. BI-RADS CATEGORY: BIRADS 4 : Suspicious    18 bilateral mammogram ([facility])  Right breast calcifications for which magnification and mediolateral views recommended. No mammographic evidence of malignancy left breast for which routine followup recommended. Category 0:  Incomplete--Need Additional Imaging Evaluation and/or Prior Mammograms for Comparison    Density B:  There are scattered areas of fibroglandular density.         Impression:  Patient Active Problem List   Diagnosis Code    Dyslipidemia E78.5    Sickle cell trait (HCC) D57.3    Microcalcifications of the breast R92.0    Vitamin D deficiency E55.9    Fibrocystic breast changes N60.19    H/O colonoscopy W55.611    Advance directive discussed with patient Z70.80    PAOLA (obstructive sleep apnea) G47.33          70year old woman with right breast atypical ductal present in a complex sclerosing papillary lesion. We discussed the results of atypical ductal hyperplasia (ADH) and complex sclerosing papillary lesion in detail. We discussed that ADH is not considered cancer or pre-cancer, but rather a high risk marker. A woman with ADH may have a higher than average risk of developing breast cancer. We discussed that sclerosing lesions also are not malignant. It may cause mass and/or abnormality on imaging. It has a complex appearance under the microscope and can resemble cancer. Traditional recommendation for both is for excision. With larger biopsy samples, there may be no residual lesion at time of excision. Risks, benefits and options of conservative management and excision were both discussed. Conservative management was described to include observation with serial exams and imaging. Excision would include needle localization. Ms. Irais Naik prefers excision. Risks, benefits and options were discussed in detail to include, but not limited to, bleeding, infection, risks of anesthesia, injury to surrounding structures and other unforeseen events such as stroke, heart attack or death. Ms. Madalyn Goldberg understands and wishes to proceed. All questions answered to her satisfaction. She was asked to call with any additional questions or concerns.

## 2018-09-21 ENCOUNTER — TELEPHONE (OUTPATIENT)
Dept: SURGERY | Age: 68
End: 2018-09-21

## 2018-09-21 LAB
ATRIAL RATE: 69 BPM
CALCULATED P AXIS, ECG09: 12 DEGREES
CALCULATED R AXIS, ECG10: 41 DEGREES
CALCULATED T AXIS, ECG11: 46 DEGREES
DIAGNOSIS, 93000: NORMAL
P-R INTERVAL, ECG05: 170 MS
Q-T INTERVAL, ECG07: 418 MS
QRS DURATION, ECG06: 88 MS
QTC CALCULATION (BEZET), ECG08: 447 MS
VENTRICULAR RATE, ECG03: 69 BPM

## 2018-09-21 NOTE — TELEPHONE ENCOUNTER
Spoke with Ms. Beasley to confirm correct name because mammogram report has listed Jan Prior. John Vernon. However it was noticed on previous demographic form in 2013 Ms. John Vernon actually signed her name as Jan WellsTimi Vernon. Ms. John Vernon confimed her maiden name was Rina Braswell.

## 2018-09-25 ENCOUNTER — HOSPITAL ENCOUNTER (OUTPATIENT)
Dept: NON INVASIVE DIAGNOSTICS | Age: 68
Discharge: HOME | End: 2018-09-25
Attending: FAMILY MEDICINE
Payer: MEDICARE

## 2018-09-25 VITALS
WEIGHT: 180 LBS | BODY MASS INDEX: 25.2 KG/M2 | HEIGHT: 71 IN | SYSTOLIC BLOOD PRESSURE: 141 MMHG | DIASTOLIC BLOOD PRESSURE: 84 MMHG

## 2018-09-25 DIAGNOSIS — I51.89 DIASTOLIC DYSFUNCTION: ICD-10-CM

## 2018-09-25 PROCEDURE — 93306 TTE W/DOPPLER COMPLETE: CPT

## 2018-09-26 ENCOUNTER — TELEPHONE (OUTPATIENT)
Dept: INTERNAL MEDICINE CLINIC | Age: 68
End: 2018-09-26

## 2018-09-26 NOTE — TELEPHONE ENCOUNTER
----- Message from Emigdio Ferguson MD sent at 9/26/2018 11:42 AM EDT -----  Please call this patient. Inform her that her recent echocardiogram results were similar to her last echocardiogram in 2016. Not a lot of significant change.      Thank you    Dr. Jessica Garnett

## 2018-09-26 NOTE — PROGRESS NOTES
Please call this patient. Inform her that her recent echocardiogram results were similar to her last echocardiogram in 2016. Not a lot of significant change. Thank you Dr. Elizabeth Lemus

## 2018-10-01 ENCOUNTER — ANESTHESIA EVENT (OUTPATIENT)
Dept: SURGERY | Age: 68
End: 2018-10-01
Payer: MEDICARE

## 2018-10-02 ENCOUNTER — APPOINTMENT (OUTPATIENT)
Dept: MAMMOGRAPHY | Age: 68
End: 2018-10-02
Attending: SURGERY
Payer: MEDICARE

## 2018-10-02 ENCOUNTER — HOSPITAL ENCOUNTER (OUTPATIENT)
Age: 68
Setting detail: OUTPATIENT SURGERY
Discharge: HOME | End: 2018-10-02
Attending: SURGERY | Admitting: SURGERY
Payer: MEDICARE

## 2018-10-02 ENCOUNTER — HOSPITAL ENCOUNTER (OUTPATIENT)
Dept: MAMMOGRAPHY | Age: 68
Discharge: HOME | End: 2018-10-02
Attending: SURGERY
Payer: MEDICARE

## 2018-10-02 ENCOUNTER — ANESTHESIA (OUTPATIENT)
Dept: SURGERY | Age: 68
End: 2018-10-02
Payer: MEDICARE

## 2018-10-02 VITALS
SYSTOLIC BLOOD PRESSURE: 136 MMHG | TEMPERATURE: 97.4 F | WEIGHT: 184 LBS | BODY MASS INDEX: 25.76 KG/M2 | RESPIRATION RATE: 16 BRPM | HEART RATE: 76 BPM | DIASTOLIC BLOOD PRESSURE: 92 MMHG | OXYGEN SATURATION: 97 % | HEIGHT: 71 IN

## 2018-10-02 DIAGNOSIS — N63.0 BREAST MASS: ICD-10-CM

## 2018-10-02 PROCEDURE — 76010000160 HC OR TIME 0.5 TO 1 HR INTENSV-TIER 1: Performed by: SURGERY

## 2018-10-02 PROCEDURE — 77030031139 HC SUT VCRL2 J&J -A: Performed by: SURGERY

## 2018-10-02 PROCEDURE — 74011636320 HC RX REV CODE- 636/320: Performed by: RADIOLOGY

## 2018-10-02 PROCEDURE — 74011000250 HC RX REV CODE- 250: Performed by: SURGERY

## 2018-10-02 PROCEDURE — 74011250636 HC RX REV CODE- 250/636: Performed by: SURGERY

## 2018-10-02 PROCEDURE — 77030018836 HC SOL IRR NACL ICUM -A: Performed by: SURGERY

## 2018-10-02 PROCEDURE — 77030032490 HC SLV COMPR SCD KNE COVD -B: Performed by: SURGERY

## 2018-10-02 PROCEDURE — 88307 TISSUE EXAM BY PATHOLOGIST: CPT | Performed by: SURGERY

## 2018-10-02 PROCEDURE — 77030012510 HC MSK AIRWY LMA TELE -B: Performed by: ANESTHESIOLOGY

## 2018-10-02 PROCEDURE — 77030003456 MAM PLC NDL/WIRE/CLIP/SEED BREAST RT

## 2018-10-02 PROCEDURE — 76210000063 HC OR PH I REC FIRST 0.5 HR: Performed by: SURGERY

## 2018-10-02 PROCEDURE — 74011000250 HC RX REV CODE- 250

## 2018-10-02 PROCEDURE — 76210000020 HC REC RM PH II FIRST 0.5 HR: Performed by: SURGERY

## 2018-10-02 PROCEDURE — 74011000272 HC RX REV CODE- 272: Performed by: SURGERY

## 2018-10-02 PROCEDURE — 77030002933 HC SUT MCRYL J&J -A: Performed by: SURGERY

## 2018-10-02 PROCEDURE — 77030003028 HC SUT VCRL J&J -A: Performed by: SURGERY

## 2018-10-02 PROCEDURE — 74011250636 HC RX REV CODE- 250/636

## 2018-10-02 PROCEDURE — 74011250636 HC RX REV CODE- 250/636: Performed by: RADIOLOGY

## 2018-10-02 PROCEDURE — 77030013079 HC BLNKT BAIR HGGR 3M -A: Performed by: ANESTHESIOLOGY

## 2018-10-02 PROCEDURE — 77030002996 HC SUT SLK J&J -A: Performed by: SURGERY

## 2018-10-02 PROCEDURE — 77030002986 HC SUT PROL J&J -A: Performed by: SURGERY

## 2018-10-02 PROCEDURE — 76060000032 HC ANESTHESIA 0.5 TO 1 HR: Performed by: SURGERY

## 2018-10-02 PROCEDURE — 74011250637 HC RX REV CODE- 250/637: Performed by: SURGERY

## 2018-10-02 RX ORDER — FENTANYL CITRATE 50 UG/ML
50 INJECTION, SOLUTION INTRAMUSCULAR; INTRAVENOUS AS NEEDED
Status: DISCONTINUED | OUTPATIENT
Start: 2018-10-02 | End: 2018-10-02 | Stop reason: HOSPADM

## 2018-10-02 RX ORDER — MORPHINE SULFATE 4 MG/ML
4 INJECTION INTRAVENOUS
Status: DISCONTINUED | OUTPATIENT
Start: 2018-10-02 | End: 2018-10-02 | Stop reason: HOSPADM

## 2018-10-02 RX ORDER — ONDANSETRON 2 MG/ML
4 INJECTION INTRAMUSCULAR; INTRAVENOUS
Status: DISCONTINUED | OUTPATIENT
Start: 2018-10-02 | End: 2018-10-02 | Stop reason: HOSPADM

## 2018-10-02 RX ORDER — MIDAZOLAM HYDROCHLORIDE 1 MG/ML
INJECTION, SOLUTION INTRAMUSCULAR; INTRAVENOUS AS NEEDED
Status: DISCONTINUED | OUTPATIENT
Start: 2018-10-02 | End: 2018-10-02 | Stop reason: HOSPADM

## 2018-10-02 RX ORDER — DEXAMETHASONE SODIUM PHOSPHATE 4 MG/ML
INJECTION, SOLUTION INTRA-ARTICULAR; INTRALESIONAL; INTRAMUSCULAR; INTRAVENOUS; SOFT TISSUE AS NEEDED
Status: DISCONTINUED | OUTPATIENT
Start: 2018-10-02 | End: 2018-10-02 | Stop reason: HOSPADM

## 2018-10-02 RX ORDER — CEFAZOLIN SODIUM IN 0.9 % NACL 2 G/100 ML
PLASTIC BAG, INJECTION (ML) INTRAVENOUS AS NEEDED
Status: DISCONTINUED | OUTPATIENT
Start: 2018-10-02 | End: 2018-10-02 | Stop reason: HOSPADM

## 2018-10-02 RX ORDER — FENTANYL CITRATE 50 UG/ML
INJECTION, SOLUTION INTRAMUSCULAR; INTRAVENOUS AS NEEDED
Status: DISCONTINUED | OUTPATIENT
Start: 2018-10-02 | End: 2018-10-02 | Stop reason: HOSPADM

## 2018-10-02 RX ORDER — SODIUM CHLORIDE 0.9 % (FLUSH) 0.9 %
5-10 SYRINGE (ML) INJECTION EVERY 8 HOURS
Status: DISCONTINUED | OUTPATIENT
Start: 2018-10-02 | End: 2018-10-02 | Stop reason: HOSPADM

## 2018-10-02 RX ORDER — OXYCODONE AND ACETAMINOPHEN 5; 325 MG/1; MG/1
1 TABLET ORAL AS NEEDED
Status: DISCONTINUED | OUTPATIENT
Start: 2018-10-02 | End: 2018-10-02 | Stop reason: HOSPADM

## 2018-10-02 RX ORDER — SODIUM CHLORIDE, SODIUM LACTATE, POTASSIUM CHLORIDE, CALCIUM CHLORIDE 600; 310; 30; 20 MG/100ML; MG/100ML; MG/100ML; MG/100ML
50 INJECTION, SOLUTION INTRAVENOUS CONTINUOUS
Status: DISCONTINUED | OUTPATIENT
Start: 2018-10-02 | End: 2018-10-02 | Stop reason: HOSPADM

## 2018-10-02 RX ORDER — SODIUM CHLORIDE 0.9 % (FLUSH) 0.9 %
5-10 SYRINGE (ML) INJECTION AS NEEDED
Status: DISCONTINUED | OUTPATIENT
Start: 2018-10-02 | End: 2018-10-02 | Stop reason: HOSPADM

## 2018-10-02 RX ORDER — FAMOTIDINE 20 MG/1
20 TABLET, FILM COATED ORAL ONCE
Status: COMPLETED | OUTPATIENT
Start: 2018-10-02 | End: 2018-10-02

## 2018-10-02 RX ORDER — FAMOTIDINE 20 MG/1
20 TABLET, FILM COATED ORAL ONCE
Status: DISCONTINUED | OUTPATIENT
Start: 2018-10-03 | End: 2018-10-02

## 2018-10-02 RX ORDER — ONDANSETRON 2 MG/ML
INJECTION INTRAMUSCULAR; INTRAVENOUS AS NEEDED
Status: DISCONTINUED | OUTPATIENT
Start: 2018-10-02 | End: 2018-10-02 | Stop reason: HOSPADM

## 2018-10-02 RX ORDER — LIDOCAINE HYDROCHLORIDE 20 MG/ML
INJECTION, SOLUTION EPIDURAL; INFILTRATION; INTRACAUDAL; PERINEURAL AS NEEDED
Status: DISCONTINUED | OUTPATIENT
Start: 2018-10-02 | End: 2018-10-02 | Stop reason: HOSPADM

## 2018-10-02 RX ORDER — EPHEDRINE SULFATE 50 MG/ML
INJECTION, SOLUTION INTRAVENOUS AS NEEDED
Status: DISCONTINUED | OUTPATIENT
Start: 2018-10-02 | End: 2018-10-02 | Stop reason: HOSPADM

## 2018-10-02 RX ORDER — ISOSULFAN BLUE 50 MG/5ML
3 INJECTION, SOLUTION SUBCUTANEOUS
Status: COMPLETED | OUTPATIENT
Start: 2018-10-02 | End: 2018-10-02

## 2018-10-02 RX ORDER — PROPOFOL 10 MG/ML
INJECTION, EMULSION INTRAVENOUS AS NEEDED
Status: DISCONTINUED | OUTPATIENT
Start: 2018-10-02 | End: 2018-10-02 | Stop reason: HOSPADM

## 2018-10-02 RX ORDER — HYDROCODONE BITARTRATE AND ACETAMINOPHEN 5; 325 MG/1; MG/1
1 TABLET ORAL
Qty: 10 TAB | Refills: 0 | Status: SHIPPED | OUTPATIENT
Start: 2018-10-02 | End: 2019-02-12

## 2018-10-02 RX ORDER — SODIUM CHLORIDE, SODIUM LACTATE, POTASSIUM CHLORIDE, CALCIUM CHLORIDE 600; 310; 30; 20 MG/100ML; MG/100ML; MG/100ML; MG/100ML
25 INJECTION, SOLUTION INTRAVENOUS CONTINUOUS
Status: DISCONTINUED | OUTPATIENT
Start: 2018-10-02 | End: 2018-10-02 | Stop reason: HOSPADM

## 2018-10-02 RX ORDER — LIDOCAINE HYDROCHLORIDE 10 MG/ML
15 INJECTION, SOLUTION EPIDURAL; INFILTRATION; INTRACAUDAL; PERINEURAL
Status: COMPLETED | OUTPATIENT
Start: 2018-10-02 | End: 2018-10-02

## 2018-10-02 RX ORDER — CEFAZOLIN SODIUM 2 G/50ML
SOLUTION INTRAVENOUS
Status: DISCONTINUED
Start: 2018-10-02 | End: 2018-10-02 | Stop reason: HOSPADM

## 2018-10-02 RX ORDER — SODIUM CHLORIDE, SODIUM LACTATE, POTASSIUM CHLORIDE, CALCIUM CHLORIDE 600; 310; 30; 20 MG/100ML; MG/100ML; MG/100ML; MG/100ML
50 INJECTION, SOLUTION INTRAVENOUS CONTINUOUS
Status: DISCONTINUED | OUTPATIENT
Start: 2018-10-03 | End: 2018-10-02

## 2018-10-02 RX ADMIN — Medication 2 G: at 09:00

## 2018-10-02 RX ADMIN — ONDANSETRON 4 MG: 2 INJECTION INTRAMUSCULAR; INTRAVENOUS at 09:51

## 2018-10-02 RX ADMIN — Medication 7 ML: at 08:05

## 2018-10-02 RX ADMIN — LIDOCAINE HYDROCHLORIDE 40 MG: 20 INJECTION, SOLUTION EPIDURAL; INFILTRATION; INTRACAUDAL; PERINEURAL at 09:10

## 2018-10-02 RX ADMIN — FAMOTIDINE 20 MG: 20 TABLET ORAL at 07:10

## 2018-10-02 RX ADMIN — EPHEDRINE SULFATE 15 MG: 50 INJECTION, SOLUTION INTRAVENOUS at 09:42

## 2018-10-02 RX ADMIN — SODIUM CHLORIDE, SODIUM LACTATE, POTASSIUM CHLORIDE, AND CALCIUM CHLORIDE 50 ML/HR: 600; 310; 30; 20 INJECTION, SOLUTION INTRAVENOUS at 07:10

## 2018-10-02 RX ADMIN — PROPOFOL 170 MG: 10 INJECTION, EMULSION INTRAVENOUS at 09:10

## 2018-10-02 RX ADMIN — MIDAZOLAM HYDROCHLORIDE 2 MG: 1 INJECTION, SOLUTION INTRAMUSCULAR; INTRAVENOUS at 08:58

## 2018-10-02 RX ADMIN — DEXAMETHASONE SODIUM PHOSPHATE 8 MG: 4 INJECTION, SOLUTION INTRA-ARTICULAR; INTRALESIONAL; INTRAMUSCULAR; INTRAVENOUS; SOFT TISSUE at 09:27

## 2018-10-02 RX ADMIN — FENTANYL CITRATE 50 MCG: 50 INJECTION, SOLUTION INTRAMUSCULAR; INTRAVENOUS at 09:10

## 2018-10-02 RX ADMIN — EPHEDRINE SULFATE 5 MG: 50 INJECTION, SOLUTION INTRAVENOUS at 09:39

## 2018-10-02 RX ADMIN — ISOSULFAN BLUE 1.5 ML: 10 INJECTION, SOLUTION SUBCUTANEOUS at 08:09

## 2018-10-02 RX ADMIN — FENTANYL CITRATE 50 MCG: 50 INJECTION, SOLUTION INTRAMUSCULAR; INTRAVENOUS at 09:55

## 2018-10-02 RX ADMIN — SODIUM CHLORIDE, SODIUM LACTATE, POTASSIUM CHLORIDE, AND CALCIUM CHLORIDE: 600; 310; 30; 20 INJECTION, SOLUTION INTRAVENOUS at 09:00

## 2018-10-02 NOTE — DISCHARGE INSTRUCTIONS
Open Breast Biopsy: What to Expect at Home  Your Recovery  An open breast biopsy is surgery to take a sample of breast tissue. A breast biopsy may be done to check a lump found during a breast exam. Or it may be done to check an area of concern found on a mammogram or ultrasound. The breast tissue will be sent to a lab, where a doctor will look at the tissue under a microscope to check for breast cancer. Your doctor may have some answers right away. But it can take up to 1 to 2 weeks to get the final results. Your doctor will discuss the results with you. For a few days after the surgery, you will probably feel tired and have some pain. The skin around the cut (incision) may feel firm, swollen, and tender. The area may be bruised. Tenderness usually goes away in a few days, and the bruising within 2 weeks. Firmness and swelling may take 3 to 6 months to go away. The stitches in your incision may dissolve on their own, or the doctor may take them out 7 to 10 days after surgery. This care sheet gives you a general idea about how long it will take for you to recover. But each person recovers at a different pace. Follow the steps below to get better as quickly as possible. How can you care for yourself at home? Activity    · Rest when you feel tired. Getting enough sleep will help you recover.     · Try to walk each day. Start by walking a little more than you did the day before. Bit by bit, increase the amount you walk. Walking boosts blood flow and helps prevent pneumonia and constipation.     · For 2 weeks, avoid strenuous activities that put pressure on your chest or that involve vigorous movement of your upper body and arm on the side of the biopsy. Examples of these might include strenuous housework, holding an active child, jogging, or aerobic exercise.     · For 2 weeks, avoid lifting anything that would make you strain.  This may include heavy grocery bags and milk containers, a heavy briefcase or backpack, cat litter or dog food bags, a vacuum , or a child.     · Ask your doctor when you can drive again.     · You will probably need to take 1 or 2 days off from work. This depends on the type of work you do and how you feel. Diet    · You can eat your normal diet. If your stomach is upset, try bland, low-fat foods like plain rice, broiled chicken, toast, and yogurt. Medicines    · Your doctor will tell you if and when you can restart your medicines. He or she will also give you instructions about taking any new medicines.     · If you take blood thinners, such as warfarin (Coumadin), clopidogrel (Plavix), or aspirin, be sure to talk to your doctor. He or she will tell you if and when to start taking those medicines again. Make sure that you understand exactly what your doctor wants you to do.     · Be safe with medicines. Take pain medicines exactly as directed. ¨ If the doctor gave you a prescription medicine for pain, take it as prescribed. ¨ If you are not taking a prescription pain medicine, ask your doctor if you can take an over-the-counter medicine.     · If you think your pain medicine is making you sick to your stomach:  ¨ Take your medicine after meals (unless your doctor has told you not to). ¨ Ask your doctor for a different pain medicine.     · If your doctor prescribed antibiotics, take them as directed. Do not stop taking them just because you feel better. You need to take the full course of antibiotics. Incision care    · If you have strips of tape on the incision, leave the tape on for a week or until it falls off.     · Wash the area daily with warm, soapy water, and pat it dry. Don't use hydrogen peroxide or alcohol, which can slow healing. You may cover the area with a gauze bandage if it weeps or rubs against clothing. Change the bandage every day.     · Keep the area clean and dry.    Other instructions    · For the first 3 days after surgery, wear a supportive bra all the time, even at night. Follow-up care is a key part of your treatment and safety. Be sure to make and go to all appointments, and call your doctor if you are having problems. It's also a good idea to know your test results and keep a list of the medicines you take. When should you call for help? Call 911 anytime you think you may need emergency care. For example, call if:    · You passed out (lost consciousness).     · You have chest pain, are short of breath, or cough up blood.    Call your doctor now or seek immediate medical care if:    · You are sick to your stomach or cannot drink fluids.     · You have pain that does not get better after you take pain medicine.     · You cannot pass stools or gas.     · You have signs of a blood clot in your leg (called a deep vein thrombosis), such as:  ¨ Pain in your calf, back of the knee, thigh, or groin. ¨ Redness or swelling in your leg.     · You have signs of infection, such as:  ¨ Increased pain, swelling, warmth, or redness. ¨ Red streaks leading from the incision. ¨ Pus draining from the incision. ¨ A fever.     · You have loose stitches, or your incision comes open.     · Bright red blood has soaked through the bandage over your incision.    Watch closely for changes in your health, and be sure to contact your doctor if:    · You have any problems.     · You have new or worse swelling or pain in your arm. Where can you learn more? Go to http://sheila-esdras.info/. Enter U563 in the search box to learn more about \"Open Breast Biopsy: What to Expect at Home. \"  Current as of: May 12, 2017  Content Version: 11.7  © 6508-3745 [employer]. Care instructions adapted under license by [employer] (which disclaims liability or warranty for this information).  If you have questions about a medical condition or this instruction, always ask your healthcare professional. Marcos Venegas disclaims any warranty or liability for your use of this information. DISCHARGE SUMMARY from Nurse    PATIENT INSTRUCTIONS:    After general anesthesia or intravenous sedation, for 24 hours or while taking prescription Narcotics:  · Limit your activities  · Do not drive and operate hazardous machinery  · Do not make important personal or business decisions  · Do  not drink alcoholic beverages  · If you have not urinated within 8 hours after discharge, please contact your surgeon on call. Report the following to your surgeon:  · Excessive pain, swelling, redness or odor of or around the surgical area  · Temperature over 100.5  · Nausea and vomiting lasting longer than 4 hours or if unable to take medications  · Any signs of decreased circulation or nerve impairment to extremity: change in color, persistent  numbness, tingling, coldness or increase pain  · Any questions    What to do at Home:  Recommended activity: Activity as tolerated and no driving for today    These are general instructions for a healthy lifestyle:    No smoking/ No tobacco products/ Avoid exposure to second hand smoke  Surgeon General's Warning:  Quitting smoking now greatly reduces serious risk to your health. Obesity, smoking, and sedentary lifestyle greatly increases your risk for illness    A healthy diet, regular physical exercise & weight monitoring are important for maintaining a healthy lifestyle    You may be retaining fluid if you have a history of heart failure or if you experience any of the following symptoms:  Weight gain of 3 pounds or more overnight or 5 pounds in a week, increased swelling in our hands or feet or shortness of breath while lying flat in bed. Please call your doctor as soon as you notice any of these symptoms; do not wait until your next office visit.     Recognize signs and symptoms of STROKE:    F-face looks uneven    A-arms unable to move or move unevenly    S-speech slurred or non-existent    T-time-call 911 as soon as signs and symptoms begin-DO NOT go       Back to bed or wait to see if you get better-TIME IS BRAIN. Warning Signs of HEART ATTACK     Call 911 if you have these symptoms:   Chest discomfort. Most heart attacks involve discomfort in the center of the chest that lasts more than a few minutes, or that goes away and comes back. It can feel like uncomfortable pressure, squeezing, fullness, or pain.  Discomfort in other areas of the upper body. Symptoms can include pain or discomfort in one or both arms, the back, neck, jaw, or stomach.  Shortness of breath with or without chest discomfort.  Other signs may include breaking out in a cold sweat, nausea, or lightheadedness. Don't wait more than five minutes to call 911 - MINUTES MATTER! Fast action can save your life. Calling 911 is almost always the fastest way to get lifesaving treatment. Emergency Medical Services staff can begin treatment when they arrive -- up to an hour sooner than if someone gets to the hospital by car. The discharge information has been reviewed with the patient. The patient verbalized understanding. Discharge medications reviewed with the patient and appropriate educational materials and side effects teaching were provided. Patient armband removed and given to patient to take home. Patient was informed of the privacy risks if armband lost or stolen.

## 2018-10-02 NOTE — OP NOTES
Date of Procedure: 10/2/2018   Preoperative Diagnosis: ADH N60.91  Postoperative Diagnosis: ADH N60.91    Procedure(s):  RIGHT BREAST NEEDLE LOC EXCISIONAL BIOPSY  Surgeon(s) and Role:     * Renetta Welch MD - Primary         Surgical Assistant: see below    Surgical Staff:  Circ-1: Mera Carver  Scrub Tech-1: Audie Foy  Surg Asst-1: Shi Blas  Surg Asst-2: Bebeto Ennis  Event Time In   Incision Start 9658   Incision Close 0953     Anesthesia: General   Estimated Blood Loss: 3cc  Specimens:   ID Type Source Tests Collected by Time Destination   1 : Right breast mass. Long stitch lateral. Short stitch superior  Needle Loc Breast Mass,Right  Renetta Welch MD 10/2/2018 4351 Pathology      Findings: wire and lesion in specimen   Complications: none noted  Implants: * No implants in log *          The patient was identified in the preoperative holding area and the risks again were reviewed with the patient who understands and agrees. She understands the risk of bleeding, infection, damage to surrounding structures, hematoma/seroma formation, and the possibility of missing the lesion in question. She also understands that if this is determined to be a malignancy, further surgery may be indicated. The patient underwent placement of a localization wire this morning and the wire is in place. She was also marked by me to confirm the site and the procedure. The patient was taken to the operating suite and placed supine on the table. Compression stockings were placed and anesthesia induced without complication. She was then prepped and draped in the usual sterile fashion and an appropriate time-out was performed to confirm the patient, the side, and the procedure. An incision was made at the 3:00 circumareolar position in the right breast. We then dissected into the subcutaneous tissue of the breast and identified the entry point of the wire on the skin.  The wire was carefully brought through the skin without dislodgement of the wire. We then used electrocautery to remove a core of breast tissue around the wire. There was excellent hemostasis and the specimen was marked for orientation on removal of the tissue. This was then handed off the field for pathologic analysis. We copiously irrigated the breast cavity and ensured there was adequate hemostasis. All sponge and instrument counts were reported to me as correct. We closed with a 3-0 vicryl suture, followed by 4-0 monocryl suture. Dermabond was then applied. The family was updated after the procedure. The patient was taken to recovery in good condition.

## 2018-10-02 NOTE — PERIOP NOTES
Phase 2 Recovery Summary Patient arrived to Phase 2 at 1030 Report received from [facility] Vitals:  
 10/02/18 1018 10/02/18 1023 10/02/18 1024 10/02/18 1030 BP: 135/74   (!) 136/92 Pulse: 71 75 76 Resp: 21 16 16 Temp:      
SpO2: 98% 98% 97% Weight:      
Height:      
 
 
oriented to time, place, person and situation Lines and Drains Peripheral Intravenous Line:   
 
Wound Wound Breast Right (Active) DRESSING STATUS Clean, dry, and intact 10/2/2018 10:43 AM  
DRESSING TYPE Topical skin adhesive/glue 10/2/2018 10:43 AM  
Incision site well approximated? Yes 10/2/2018 10:08 AM  
Number of days:0 Patient discharged to home with Daughter, Portia Veliz  at [address]

## 2018-10-02 NOTE — H&P (VIEW-ONLY)
Breat Mass Consult Ms. Clark Knight is a 70year old woman who was referred for right breast atypical ductal hyperplasia present in a complex sclerosing papillary lesion. The area of concern was identified on routine screening exam. She denies breast pain, mass or nipple discharge. She reports a history of benign breast biopsy. Her most recent previous mammogram was 7/26/17. There is not family history of breast cancer. Her sister has had multiple benign breast masses. Breast/GYN history:   
Past Medical History:  
Diagnosis Date  Advance directive discussed with patient 09/06/2016  Dyslipidemia  H/O colonoscopy 08/12/14 Done by Dr. Anila Burns: diverticular dz & hemorrhoids, repeat in 10 years  Microcalcifications of the breast 2013  
 left breast s/p Vacuum-assisted core needle biopsy of the left breast with placement of localizing clip  6/18/2013. Being followed by Dr. Melody Venegas. Suppose to have mammoram in 6 months. Path showed firbrocystic changes  PAOLA (obstructive sleep apnea) 03/02/2017 on CPAP machine. Being followed by Dr. Jaime Guillory  Sickle cell trait ([facility] [address]  Vitamin D deficiency Past Surgical History:  
Procedure Laterality Date  HX BREAST BIOPSY Bilateral 1994  
 bilateral biopsy-[facility]  
 HX HYSTERECTOMY  1994  
 unsure of exact date [id number], [facility]  KNEE SCOPE,MED OR LAT MENIS REPAIR  2006  
 right knee  ANIBAL BIOPSY BREAST STEREOTACTIC Left 2013  
 benign Current Outpatient Prescriptions on File Prior to Visit Medication Sig Dispense Refill  atorvastatin (LIPITOR) 10 mg tablet TAKE 1 TABLET BY MOUTH DAILY 90 Tab 3  cholecalciferol, vitamin D3, (VITAMIN D3) 2,000 unit tab Take 2,000 Int'l Units by mouth daily.  omega-3 fatty acids-vitamin e (FISH OIL) 1,000 mg cap Take 2 Caps by mouth daily.  iron 50 mg iron Tab Take  by mouth daily. Take one po daily  acetaminophen (TYLENOL ARTHRITIS PAIN) 650 mg CR tablet Take 650 mg by mouth every six (6) hours as needed. No current facility-administered medications on file prior to visit. Allergies Allergen Reactions  Other Food Other (comments)  
  balsamic vinegar/vinegar: swelling in leg Social History Substance Use Topics  Smoking status: Never Smoker  Smokeless tobacco: Never Used Comment: pt counseled to continue to not smoke.  Alcohol use 0.0 oz/week Comment: occ Family History Problem Relation Age of Onset  Hypertension Mother  Diabetes Mother  Hypertension Father  Hypertension Maternal Grandmother  Hypertension Maternal Grandfather ROS:  
General: denies fevers, chills, night sweats, fatigue, weight loss, weight gain GI: denies nausea, vomiting, abdominal pain, change in bowel habits, hematochezia, melena, GERD Integ: denies dermatitis, abnormal moles HEENT: denies visual changes, vertigo, epistaxis, dysphagia, hoarseness Cardiac: denies chest pain, palpitations, HTN, edema, varicosities Resp: denies cough, shortness of breath, wheezing, hemoptysis, snoring, reactive airway disease : denies hematuria, dysuria, frequency, urgency, nocturia, stress urinary incontinence MSK: weakness, joint pain, arthritis Endocrine: denies diabetes, thyroid disease, polyuria, polydipsia, polyphagia, poor wound healing, heat intolerance, cold intolerance Lymph/Heme: denies anemia, bruising, history of blood transfusions Neuro: denies dizziness, headache, fainting, seizures, stroke Psych: denies anxiety, depression Physical Exam: 
Visit Vitals  BP (!) 143/91 (BP 1 Location: Right arm, BP Patient Position: Sitting)  Pulse 79  Temp 98.3 °F (36.8 °C) (Oral)  Resp 16  
 Ht 5' 9\" (1.753 m)  Wt 82.2 kg (181 lb 3.2 oz)  SpO2 100%  BMI 26.76 kg/m2 Gen: Well developed, well nourished woman in no acute distress Head: normocephalic, atraumatic Mouth: Clear, no overt lesions, oral mucosa pink and moist 
Neck: supple, no masses, no adenopathy, trachea midline Resp: clear bilaterally Cardio: Regular rate and rhythm Breasts: Examined in both the supine and upright positions. There was no supraclavicular, infraclavicular, or axillary lympadenopathy. There were no dominant masses, no skin changes, no asymmetry identified, well healed biopsy scar left breast, healed core biopsy site right breast 
Abdomen: soft, nontender, nondistended Extremeties: warm, well-perfused Neuro: sensation and strength grossly intact and symmetrical 
Psych: alert and oriented to person, place and time Pathology: 
18 A. RIGHT BREAST, INNER MIDDLE ONE THIRD, BIOPSY: 
- FOCAL ATYPICAL DUCTAL HYPERPLASIA PRESENT IN A COMPLEX SCLEROSING PAPILLARY LESION WITH ASSOCIATED CALCIFICATIONS. Imagin18: right mammogram ([facility]) IMPRESSION:  New suspicious linear heterogeneous calcifications in the inner right breast, middle one third for which stereotactic breast biopsy is recommended. These findings and recommendations were discussed with patient time of evaluation. BI-RADS CATEGORY: BIRADS 4 : Suspicious 18 bilateral mammogram ([facility]) Right breast calcifications for which magnification and mediolateral views recommended. No mammographic evidence of malignancy left breast for which routine followup recommended. Category 0:  Incomplete--Need Additional Imaging Evaluation and/or Prior Mammograms for Comparison Density B:  There are scattered areas of fibroglandular density. Impression: 
Patient Active Problem List  
Diagnosis Code  Dyslipidemia E78.5  Sickle cell trait ([facility] [address] D57.3  Microcalcifications of the breast R92.0  Vitamin D deficiency E55.9  Fibrocystic breast changes N60.19  
 H/O colonoscopy Z98.890  
  Advance directive discussed with patient Z70.80  
 PAOLA (obstructive sleep apnea) G49.32  
 
 
 
 70year old woman with right breast atypical ductal present in a complex sclerosing papillary lesion. We discussed the results of atypical ductal hyperplasia (ADH) and complex sclerosing papillary lesion in detail. We discussed that ADH is not considered cancer or pre-cancer, but rather a high risk marker. A woman with ADH may have a higher than average risk of developing breast cancer. We discussed that sclerosing lesions also are not malignant. It may cause mass and/or abnormality on imaging. It has a complex appearance under the microscope and can resemble cancer. Traditional recommendation for both is for excision. With larger biopsy samples, there may be no residual lesion at time of excision. Risks, benefits and options of conservative management and excision were both discussed. Conservative management was described to include observation with serial exams and imaging. Excision would include needle localization. Ms. Clark Knight prefers excision. Risks, benefits and options were discussed in detail to include, but not limited to, bleeding, infection, risks of anesthesia, injury to surrounding structures and other unforeseen events such as stroke, heart attack or death. Ms. Breonna Barth understands and wishes to proceed. All questions answered to her satisfaction. She was asked to call with any additional questions or concerns.

## 2018-10-02 NOTE — IP AVS SNAPSHOT
[address] [address] 
[phone] Patient: Erica Crowe MRN: [id number] KRE:[id number] About your hospitalization You were admitted on:  October 2, 2018 You last received care in the:  [facility] PHASE 2 RECOVERY You were discharged on:  October 2, 2018 Why you were hospitalized Your primary diagnosis was:  Not on File Follow-up Information Follow up With Details Comments Contact Info MD Jac Chavez 75 [facility] 100 52 [facility] [phone] Your Scheduled Appointments Wednesday October 10, 2018  1:00 PM EDT  
POST OP with Dari Teixeira MD  
[address] [facility] [address] [address] [phone]  
[phone] Discharge Orders None A check jonathan indicates which time of day the medication should be taken. My Medications START taking these medications Instructions Each Dose to Equal  
 Morning Noon Evening Bedtime HYDROcodone-acetaminophen 5-325 mg per tablet Commonly known as:  Shine Meg Your last dose was: Your next dose is: Take 1 Tab by mouth every six (6) hours as needed for Pain. Max Daily Amount: 4 Tabs. Limit acetaminophen from all sources to 4,000 mg/day 1 Tab CONTINUE taking these medications Instructions Each Dose to Equal  
 Morning Noon Evening Bedtime  
 atorvastatin 10 mg tablet Commonly known as:  LIPITOR Your last dose was: Your next dose is: TAKE 1 TABLET BY MOUTH DAILY FISH OIL 1,000 mg Cap Generic drug:  omega-3 fatty acids-vitamin e Your last dose was: Your next dose is: Take 2 Caps by mouth daily. 2 Cap  
    
   
   
   
  
 iron 50 mg iron Tab Your last dose was: Your next dose is: Take 325 mg by mouth daily. Take one po daily 325 mg  
    
   
   
   
  
 TYLENOL ARTHRITIS PAIN 650 mg Tber Generic drug:  acetaminophen Your last dose was: Your next dose is: Take 650 mg by mouth every six (6) hours as needed. 650 mg  
    
   
   
   
  
 VITAMIN D3 2,000 unit Tab Generic drug:  cholecalciferol (vitamin D3) Your last dose was: Your next dose is: Take 2,000 Int'l Units by mouth daily. 2000 Int'l Units Where to Get Your Medications Information on where to get these meds will be given to you by the nurse or doctor. ! Ask your nurse or doctor about these medications HYDROcodone-acetaminophen 5-325 mg per tablet Opioid Education Prescription Opioids: What You Need to Know: 
 
Prescription opioids can be used to help relieve moderate-to-severe pain and are often prescribed following a surgery or injury, or for certain health conditions. These medications can be an important part of treatment but also come with serious risks. Opioids are strong pain medicines. Examples include hydrocodone, oxycodone, fentanyl, and morphine. Heroin is an example of an illegal opioid. It is important to work with your health care provider to make sure you are getting the safest, most effective care. WHAT ARE THE RISKS AND SIDE EFFECTS OF OPIOID USE? Prescription opioids carry serious risks of addiction and overdose, especially with prolonged use. An opioid overdose, often marked by slow breathing, can cause sudden death. The use of prescription opioids can have a number of side effects as well, even when taken as directed. · Tolerance-meaning you might need to take more of a medication for the same pain relief · Physical dependence-meaning you have symptoms of withdrawal when the medication is stopped. Withdrawal symptoms can include nausea, sweating, chills, diarrhea, stomach cramps, and muscle aches. Withdrawal can last up to several weeks, depending on which drug you took and how long you took it. · Increased sensitivity to pain · Constipation · Nausea, vomiting, and dry mouth · Sleepiness and dizziness · Confusion · Depression · Low levels of testosterone that can result in lower sex drive, energy, and strength · Itching and sweating RISKS ARE GREATER WITH:      
· History of drug misuse, substance use disorder, or overdose · Mental health conditions (such as depression or anxiety) · Sleep apnea · Older age (72 years or older) · Pregnancy Avoid alcohol while taking prescription opioids. Also, unless specifically advised by your health care provider, medications to avoid include: · Benzodiazepines (such as Xanax or Valium) · Muscle relaxants (such as Soma or Flexeril) · Hypnotics (such as Ambien or Lunesta) · Other prescription opioids KNOW YOUR OPTIONS Talk to your health care provider about ways to manage your pain that don't involve prescription opioids. Some of these options may actually work better and have fewer risks and side effects. Consult your physician before adding or stopping any medications, treatments, or physical activity. Options may include: 
· Pain relievers such as acetaminophen, ibuprofen, and naproxen · Some medications that are also used for depression or seizures · Physical therapy and exercise · Counseling to help patients learn how to cope better with triggers of pain and stress. · Application of heat or cold compress · Massage therapy · Relaxation techniques Be Informed Make sure you know the name of your medication, how much and how often to take it, and its potential risks & side effects. IF YOU ARE PRESCRIBED OPIOIDS FOR PAIN: 
· Never take opioids in greater amounts or more often than prescribed. Remember the goal is not to be pain-free but to manage your pain at a tolerable level. · Follow up with your primary care provider to: · Work together to create a plan on how to manage your pain. · Talk about ways to help manage your pain that don't involve prescription opioids. · Talk about any and all concerns and side effects. · Help prevent misuse and abuse. · Never sell or share prescription opioids · Help prevent misuse and abuse. · Store prescription opioids in a secure place and out of reach of others (this may include visitors, children, friends, and family). · Safely dispose of unused/unwanted prescription opioids: Find your community drug take-back program or your pharmacy mail-back program, or flush them down the toilet, following guidance from the Food and Drug Administration (www.fda.gov/Drugs/ResourcesForYou). · Visit www.cdc.gov/drugoverdose to learn about the risks of opioid abuse and overdose. · If you believe you may be struggling with addiction, tell your health care provider and ask for guidance or call [address] [place], [place] [address]rose [facility] at 1-265-677-ODHS. Discharge Instructions Open Breast Biopsy: What to Expect at Home Your Recovery An open breast biopsy is surgery to take a sample of breast tissue. A breast biopsy may be done to check a lump found during a breast exam. Or it may be done to check an area of concern found on a mammogram or ultrasound. The breast tissue will be sent to a lab, where a doctor will look at the tissue under a microscope to check for breast cancer. Your doctor may have some answers right away. But it can take up to 1 to 2 weeks to get the final results. Your doctor will discuss the results with you. For a few days after the surgery, you will probably feel tired and have some pain.  The skin around the cut (incision) may feel firm, swollen, and tender. The area may be bruised. Tenderness usually goes away in a few days, and the bruising within 2 weeks. Firmness and swelling may take 3 to 6 months to go away. The stitches in your incision may dissolve on their own, or the doctor may take them out 7 to 10 days after surgery. This care sheet gives you a general idea about how long it will take for you to recover. But each person recovers at a different pace. Follow the steps below to get better as quickly as possible. How can you care for yourself at home? Activity 
  · Rest when you feel tired. Getting enough sleep will help you recover.  
  · Try to walk each day. Start by walking a little more than you did the day before. Bit by bit, increase the amount you walk. Walking boosts blood flow and helps prevent pneumonia and constipation.  
  · For 2 weeks, avoid strenuous activities that put pressure on your chest or that involve vigorous movement of your upper body and arm on the side of the biopsy. Examples of these might include strenuous housework, holding an active child, jogging, or aerobic exercise.  
  · For 2 weeks, avoid lifting anything that would make you strain. This may include heavy grocery bags and milk containers, a heavy briefcase or backpack, cat litter or dog food bags, a vacuum , or a child.  
  · Ask your doctor when you can drive again.  
  · You will probably need to take 1 or 2 days off from work. This depends on the type of work you do and how you feel. Diet 
  · You can eat your normal diet. If your stomach is upset, try bland, low-fat foods like plain rice, broiled chicken, toast, and yogurt. Medicines 
  · Your doctor will tell you if and when you can restart your medicines. He or she will also give you instructions about taking any new medicines.  
  · If you take blood thinners, such as warfarin (Coumadin), clopidogrel (Plavix), or aspirin, be sure to talk to your doctor.  He or she will tell you if and when to start taking those medicines again. Make sure that you understand exactly what your doctor wants you to do.  
  · Be safe with medicines. Take pain medicines exactly as directed. ¨ If the doctor gave you a prescription medicine for pain, take it as prescribed. ¨ If you are not taking a prescription pain medicine, ask your doctor if you can take an over-the-counter medicine.  
  · If you think your pain medicine is making you sick to your stomach: 
¨ Take your medicine after meals (unless your doctor has told you not to). ¨ Ask your doctor for a different pain medicine.  
  · If your doctor prescribed antibiotics, take them as directed. Do not stop taking them just because you feel better. You need to take the full course of antibiotics. Incision care 
  · If you have strips of tape on the incision, leave the tape on for a week or until it falls off.  
  · Wash the area daily with warm, soapy water, and pat it dry. Don't use hydrogen peroxide or alcohol, which can slow healing. You may cover the area with a gauze bandage if it weeps or rubs against clothing. Change the bandage every day.  
  · Keep the area clean and dry. Other instructions 
  · For the first 3 days after surgery, wear a supportive bra all the time, even at night. Follow-up care is a key part of your treatment and safety. Be sure to make and go to all appointments, and call your doctor if you are having problems. It's also a good idea to know your test results and keep a list of the medicines you take. When should you call for help? Call 911 anytime you think you may need emergency care. For example, call if: 
  · You passed out (lost consciousness).  
  · You have chest pain, are short of breath, or cough up blood.  
 Call your doctor now or seek immediate medical care if: 
  · You are sick to your stomach or cannot drink fluids.  
  · You have pain that does not get better after you take pain medicine.   · You cannot pass stools or gas.  
  · You have signs of a blood clot in your leg (called a deep vein thrombosis), such as: 
¨ Pain in your calf, back of the knee, thigh, or groin. ¨ Redness or swelling in your leg.  
  · You have signs of infection, such as: 
¨ Increased pain, swelling, warmth, or redness. ¨ Red streaks leading from the incision. ¨ Pus draining from the incision. ¨ A fever.  
  · You have loose stitches, or your incision comes open.  
  · Bright red blood has soaked through the bandage over your incision.  
 Watch closely for changes in your health, and be sure to contact your doctor if: 
  · You have any problems.  
  · You have new or worse swelling or pain in your arm. Where can you learn more? Go to http://sheila-esdras.info/. Enter T358 in the search box to learn more about \"Open Breast Biopsy: What to Expect at Home. \" Current as of: May 12, 2017 Content Version: 11.7 © 9888-1041 [employer]. Care instructions adapted under license by [employer] (which disclaims liability or warranty for this information). If you have questions about a medical condition or this instruction, always ask your healthcare professional. [place] [address] any warranty or liability for your use of this information. DISCHARGE SUMMARY from Nurse PATIENT INSTRUCTIONS: 
 
After general anesthesia or intravenous sedation, for 24 hours or while taking prescription Narcotics: · Limit your activities · Do not drive and operate hazardous machinery · Do not make important personal or business decisions · Do  not drink alcoholic beverages · If you have not urinated within 8 hours after discharge, please contact your surgeon on call. Report the following to your surgeon: 
· Excessive pain, swelling, redness or odor of or around the surgical area · Temperature over 100.5 · Nausea and vomiting lasting longer than 4 hours or if unable to take medications · Any signs of decreased circulation or nerve impairment to extremity: change in color, persistent  numbness, tingling, coldness or increase pain · Any questions What to do at Home: 
Recommended activity: Activity as tolerated and no driving for today These are general instructions for a healthy lifestyle: No smoking/ No tobacco products/ Avoid exposure to second hand smoke Surgeon General's Warning:  Quitting smoking now greatly reduces serious risk to your health. Obesity, smoking, and sedentary lifestyle greatly increases your risk for illness A healthy diet, regular physical exercise & weight monitoring are important for maintaining a healthy lifestyle You may be retaining fluid if you have a history of heart failure or if you experience any of the following symptoms:  Weight gain of 3 pounds or more overnight or 5 pounds in a week, increased swelling in our hands or feet or shortness of breath while lying flat in bed. Please call your doctor as soon as you notice any of these symptoms; do not wait until your next office visit. Recognize signs and symptoms of STROKE: 
 
F-face looks uneven A-arms unable to move or move unevenly S-speech slurred or non-existent T-time-call 911 as soon as signs and symptoms begin-DO NOT go Back to bed or wait to see if you get better-TIME IS BRAIN. Warning Signs of HEART ATTACK Call 911 if you have these symptoms: 
? Chest discomfort. Most heart attacks involve discomfort in the center of the chest that lasts more than a few minutes, or that goes away and comes back. It can feel like uncomfortable pressure, squeezing, fullness, or pain. ? Discomfort in other areas of the upper body. Symptoms can include pain or discomfort in one or both arms, the back, neck, jaw, or stomach. ? Shortness of breath with or without chest discomfort. ? Other signs may include breaking out in a cold sweat, nausea, or lightheadedness. Don't wait more than five minutes to call [address]! Fast action can save your life. Calling 911 is almost always the fastest way to get lifesaving treatment. Emergency Medical Services staff can begin treatment when they arrive  up to an hour sooner than if someone gets to the hospital by car. The discharge information has been reviewed with the patient. The patient verbalized understanding. Discharge medications reviewed with the patient and appropriate educational materials and side effects teaching were provided. Patient armband removed and given to patient to take home. Patient was informed of the privacy risks if armband lost or stolen. ACO Transitions of Care Introducing [facility] 50 Daniela Bello offers a voluntary care coordination program to provide high quality service and care to [facility] fee-for-service beneficiaries. Sheldon Young was designed to help you enhance your health and well-being through the following services: ? Transitions of Care  support for individuals who are transitioning from one care setting to another (example: Hospital to home). ? Chronic and Complex Care Coordination  support for individuals and caregivers of those with serious or chronic illnesses or with more than one chronic (ongoing) condition and those who take a number of different medications. If you meet specific medical criteria, a [facility] [address] may call you directly to coordinate your care with your primary care physician and your other care providers. For questions about the [facility] programs, please, contact your physicians office. For general questions or additional information about Accountable Care Organizations: 
Please visit www.medicare.gov/acos. html or call 1-800-MEDICARE (0-475.736.2539) TTY users should call 3-609.523.2349. Introducing [facility] SERVICES! [facility] introduces ScalITt patient portal. Now you can access parts of your medical record, email your doctor's office, and request medication refills online. 1. In your internet browser, go to https://Common Interest Communities. Health: [employer] 2. Click on the First Time User? Click Here link in the Sign In box. You will see the New Member Sign Up page. 3. Enter your Enclarity Access Code exactly as it appears below. You will not need to use this code after youve completed the sign-up process. If you do not sign up before the expiration date, you must request a new code. · Enclarity Access Code: [email or web address] Expires: 12/19/2018 10:41 AM 
 
4. Enter the last four digits of your Social Security Number (xxxx) and Date of Birth (mm/dd/yyyy) as indicated and click Submit. You will be taken to the next sign-up page. 5. Create a Enclarity ID. This will be your Enclarity login ID and cannot be changed, so think of one that is secure and easy to remember. 6. Create a Enclarity password. You can change your password at any time. 7. Enter your Password Reset Question and Answer. This can be used at a later time if you forget your password. 8. Enter your e-mail address. You will receive e-mail notification when new information is available in [address] 9. Click Sign Up. You can now view and download portions of your medical record. 10. Click the Download Summary menu link to download a portable copy of your medical information. If you have questions, please visit the Frequently Asked Questions section of the Enclarity website. Remember, Enclarity is NOT to be used for urgent needs. For medical emergencies, dial 911. Now available from your iPhone and Android! Introducing Shayne Boles As a [facility] patient, I wanted to make you aware of our electronic visit tool called Shayne Boles. [facility] 24/7 allows you to connect within minutes with a medical provider 24 hours a day, seven days a week via a mobile device or tablet or logging into a secure website from your computer. You can access TripFab from anywhere in the [place]. A virtual visit might be right for you when you have a simple condition and feel like you just dont want to get out of bed, or cant get away from work for an appointment, when your regular [facility] provider is not available (evenings, weekends or holidays), or when youre out of town and need minor care. Electronic visits cost only $49 and if the Shareable Ink 24/Weave provider determines a prescription is needed to treat your condition, one can be electronically transmitted to a nearby pharmacy*. Please take a moment to enroll today if you have not already done so. The enrollment process is free and takes just a few minutes. To enroll, please download the Flickr kelly to your tablet or phone, or visit [email or web address] to enroll on your computer. And, as an [address] [place] [address] patient with a Snoobe account, the results of your visits will be scanned into your electronic medical record and your primary care provider will be able to view the scanned results. We urge you to continue to see your regular [facility] provider for your ongoing medical care. And while your primary care provider may not be the one available when you seek a Shayne Nayakfin virtual visit, the peace of mind you get from getting a real diagnosis real time can be priceless. For more information on Shayne Darwin Labmicahfin, view our Frequently Asked Questions (FAQs) at [email or web address]. Sincerely, 
 
Azeb Villalba MD 
Chief Medical Officer Birgit Bello *:  certain medications cannot be prescribed via TripFab Unresulted Labs-Please follow up with your PCP about these lab tests Order Current Status ANIBAL BREAST RT SURG SPEC In process Providers Seen During Your Hospitalization Provider Specialty Primary office phone Ana Simmons MD Breast Surgery [phone] Your Primary Care Physician (PCP) Primary Care Physician Office Phone Office Fax [address] [facility], [address] [place] [address] Road [phone] You are allergic to the following Allergen Reactions Other Food Other (comments)  
 balsamic vinegar/vinegar: swelling in leg Recent Documentation Height Weight BMI OB Status Smoking Status 1.803 m 83.5 kg 25.66 kg/m2 Hysterectomy Never Smoker Emergency Contacts Name Discharge Info Relation Home Work Mobile Sachi Beasley DISCHARGE CAREGIVER [3] Child [2] [phone] Soni Astorga CAREGIVER [3] Sister [23]   [phone] Patient Belongings The following personal items are in your possession at time of discharge: 
  Dental Appliances: None  Visual Aid: Glasses      Home Medications: None      Clothing: Undergarments, Shirt, Socks, Footwear, Pants    Other Valuables: None Please provide this summary of care documentation to your next provider. Signatures-by signing, you are acknowledging that this After Visit Summary has been reviewed with you and you have received a copy. Patient Signature:  ____________________________________________________________ Date:  ____________________________________________________________  
  
Jane Lewis Pierce Provider Signature:  ____________________________________________________________ Date:  ____________________________________________________________

## 2018-10-02 NOTE — INTERVAL H&P NOTE
H&P Update: 
Virgen Herr was seen and examined. History and physical has been reviewed. The patient has been examined.  There have been no significant clinical changes since the completion of the originally dated History and Physical. 
 
Signed By: Emma Renee MD   
 October 2, 2018 8:49 AM

## 2018-10-02 NOTE — ANESTHESIA POSTPROCEDURE EVALUATION
Post-Anesthesia Evaluation and Assessment Patient: Leigh Ann Kerr MRN: [id number]  SSN: [id number] YOB: 1950  Age: 70 y.o. Sex: female Data from PACU flowsheet Cardiovascular Function/Vital Signs Visit Vitals  BP (!) 136/92  Pulse 76  Temp 36.3 °C (97.4 °F)  Resp 16  
 Ht 5' 11\" (1.803 m)  Wt 83.5 kg (184 lb)  SpO2 97%  BMI 25.66 kg/m2 Patient is status post general anesthesia for Procedure(s): RIGHT BREAST NEEDLE LOC EXCISIONAL BIOPSY. Nausea/Vomiting: controlled Postoperative hydration reviewed and adequate. Pain: 
Pain Scale 1: Numeric (0 - 10) (10/02/18 1030) Pain Intensity 1: 0 (10/02/18 1030) Managed Mental Status and Level of Consciousness: Alert and oriented Pulmonary Status:  
O2 Device: Oxygen mask (10/02/18 1008) Adequate oxygenation and airway patent Complications related to anesthesia: None Post-anesthesia assessment completed. No concerns Signed By: Karine Jenkins MD   
 October 2, 2018

## 2018-10-02 NOTE — PERIOP NOTES
1005 Pt received to PACU and connected to monitor. Bedside report given by Angelina Freeman RN. Vital signs stable. Nurse at bedside. Will continue to monitor.

## 2018-10-02 NOTE — PROGRESS NOTES
conducted a pre-surgery visit with Artemio Houser, who is a 70 y.o.,female. The  provided the following Interventions: 
Initiated a relationship of care and support. Offered prayer and assurance of continued prayers on patient's behalf. Plan: 
Chaplains will continue to follow and will provide pastoral care on an as needed/requested basis.  recommends bedside caregivers page  on duty if patient shows signs of acute spiritual or emotional distress. Reiseñor 3 Board Certified [employer]  
[phone]

## 2018-10-02 NOTE — ANESTHESIA PREPROCEDURE EVALUATION
Anesthetic History No history of anesthetic complications Review of Systems / Medical History Patient summary reviewed and pertinent labs reviewed Pulmonary Sleep apnea: CPAP Neuro/Psych Within defined limits Cardiovascular Hypertension: well controlled GI/Hepatic/Renal 
Within defined limits Endo/Other Within defined limits Other Findings Physical Exam 
 
Airway Mallampati: II 
TM Distance: 4 - 6 cm Neck ROM: normal range of motion Mouth opening: Normal 
 
 Cardiovascular Dental 
No notable dental hx Pulmonary Abdominal 
GI exam deferred Other Findings Anesthetic Plan ASA: 3 Anesthesia type: general 
 
 
 
 
Induction: Intravenous Anesthetic plan and risks discussed with: Patient

## 2018-10-10 ENCOUNTER — OFFICE VISIT (OUTPATIENT)
Dept: SURGERY | Age: 68
End: 2018-10-10

## 2018-10-10 ENCOUNTER — DOCUMENTATION ONLY (OUTPATIENT)
Dept: SURGERY | Age: 68
End: 2018-10-10

## 2018-10-10 VITALS
RESPIRATION RATE: 16 BRPM | TEMPERATURE: 98 F | DIASTOLIC BLOOD PRESSURE: 88 MMHG | HEIGHT: 71 IN | HEART RATE: 76 BPM | BODY MASS INDEX: 25.7 KG/M2 | WEIGHT: 183.6 LBS | SYSTOLIC BLOOD PRESSURE: 152 MMHG

## 2018-10-10 DIAGNOSIS — N60.91 ATYPICAL DUCTAL HYPERPLASIA OF RIGHT BREAST: Primary | ICD-10-CM

## 2018-10-10 NOTE — LETTER
10/10/2018 1:04 PM 
 
Patient:  Jacqueline Naranjo YOB: 1950 Date of Visit: 10/10/2018 Merwyn Felty, MD 
[facility] [address] [place] [address] VIA In Basket Dear Merwyn Felty, MD, 
 
 
I had the pleasure of seeing Ms. Marisa Last in my office today for follow up of her ADH. I am including a copy of my office visit today. If you have questions, please do not hesitate to call me. I look forward to following Ms. Greggory Aschoff along with you and will keep you updated as to her progress. Sincerely, Samuel Alston MD

## 2018-10-10 NOTE — PROGRESS NOTES
Appointment:  Wednesday, October 24, 2018 at 10:00am with Dr. Dannie Krause located at [facility] [address] [place] [address] [address] (v) [phone]

## 2018-10-10 NOTE — MR AVS SNAPSHOT
[address] [facility] [address] [address] 
[phone] Patient: Kirti Braswell MRN: [id number] OZO:[birth year] Visit Information Date & Time Provider Department Dept. Phone Encounter #  
 10/10/2018  1:00 PM Ruben Mercedes MD [facility] [id number] Follow-up Instructions Return in about 3 months (around 1/10/2019). Follow-up and Disposition History Your Appointments 1/9/2019  2:30 PM  
Follow Up with Ruben Mercedes MD  
[address]) Appt Note: 3 month follow up ( R Breast w/ LOC 10/2/2018) [facility] [address] [facility] Alem Karen De Gasperi [address] Upcoming Health Maintenance Date Due Shingrix Vaccine Age 50> (1 of 2) 3/10/1999 Pneumococcal 65+ Low/Medium Risk (2 of 2 - PPSV23) 6/10/2018 Influenza Age 5 to Adult 8/1/2018 MEDICARE YEARLY EXAM 9/12/2018 GLAUCOMA SCREENING Q2Y 1/19/2020 BREAST CANCER SCRN MAMMOGRAM 7/31/2020 COLONOSCOPY 8/12/2024 DTaP/Tdap/Td series (2 - Td) 3/12/2026 Allergies as of 10/10/2018  Review Complete On: 10/10/2018 By: Ruben Mercedes MD  
  
 Severity Noted Reaction Type Reactions Other Food  09/11/2017    Other (comments)  
 balsamic vinegar/vinegar: swelling in leg Current Immunizations  Reviewed on 12/18/2017 Name Date Influenza High Dose Vaccine PF 9/11/2017  9:59 AM, 9/27/2015 Pneumococcal Conjugate (PCV-13) 4/25/2016 Pneumococcal Vaccine (Unspecified Type) 6/10/2013 Tdap 3/12/2016 11:04 AM  
  
 Not reviewed this visit You Were Diagnosed With   
  
 Codes Comments Atypical ductal hyperplasia of right breast    -  Primary ICD-10-CM: N60.91 
ICD-9-CM: 610.8 Vitals BP Pulse Temp Resp Height(growth percentile) Weight(growth percentile) 152/88 (BP 1 Location: Right arm, BP Patient Position: Sitting) 76 98 °F (36.7 °C) (Oral) 16 5' 11\" (1.803 m) 183 lb 9.6 oz (83.3 kg) BMI OB Status Smoking Status 25.61 kg/m2 Hysterectomy Never Smoker BMI and BSA Data Body Mass Index Body Surface Area  
 25.61 kg/m 2 2.04 m 2 Preferred Pharmacy Pharmacy Name Phone Elif Rogers [address] [place] [address] Eduar Perdomo Your Updated Medication List  
  
   
This list is accurate as of 10/10/18  1:10 PM.  Always use your most recent med list.  
  
  
  
  
 atorvastatin 10 mg tablet Commonly known as:  LIPITOR  
TAKE 1 TABLET BY MOUTH DAILY FISH OIL 1,000 mg Cap Generic drug:  omega-3 fatty acids-vitamin e Take 2 Caps by mouth daily. HYDROcodone-acetaminophen 5-325 mg per tablet Commonly known as:  Sena King Take 1 Tab by mouth every six (6) hours as needed for Pain. Max Daily Amount: 4 Tabs. Limit acetaminophen from all sources to 4,000 mg/day  
  
 iron 50 mg iron Tab Take 325 mg by mouth daily. Take one po daily TYLENOL ARTHRITIS PAIN 650 mg Maxwell Persons Generic drug:  acetaminophen Take 650 mg by mouth every six (6) hours as needed. VITAMIN D3 2,000 unit Tab Generic drug:  cholecalciferol (vitamin D3) Take 2,000 Int'l Units by mouth daily. Follow-up Instructions Return in about 3 months (around 1/10/2019). Patient Instructions If you have any questions or concerns about today's appointment, the verbal and/or written instructions you were given for follow up care, please call our office at [phone]. [facility] - [address], [address] 
 
[phone] office [phone] fax Appointment Wednesday, October 24, 2018 at 10:00am with Dr. Nathan Briggs located at [facility] [address] [facility], 1309 [facility] (j) [phone] Introducing [facility]! [facility] introduces Quantitative Medicine patient portal. Now you can access parts of your medical record, email your doctor's office, and request medication refills online. 1. In your internet browser, go to [email or web address] 2. Click on the First Time User? Click Here link in the Sign In box. You will see the New Member Sign Up page. 3. Enter your Quantitative Medicine Access Code exactly as it appears below. You will not need to use this code after youve completed the sign-up process. If you do not sign up before the expiration date, you must request a new code. · Quantitative Medicine Access Code: [email or web address] Expires: 12/19/2018 10:41 AM 
 
4. Enter the last four digits of your Social Security Number (xxxx) and Date of Birth (mm/dd/yyyy) as indicated and click Submit. You will be taken to the next sign-up page. 5. Create a Quantitative Medicine ID. This will be your Quantitative Medicine login ID and cannot be changed, so think of one that is secure and easy to remember. 6. Create a Quantitative Medicine password. You can change your password at any time. 7. Enter your Password Reset Question and Answer. This can be used at a later time if you forget your password. 8. Enter your e-mail address. You will receive e-mail notification when new information is available in [address] 9. Click Sign Up. You can now view and download portions of your medical record. 10. Click the Download Summary menu link to download a portable copy of your medical information. If you have questions, please visit the Frequently Asked Questions section of the Quantitative Medicine website. Remember, Quantitative Medicine is NOT to be used for urgent needs. For medical emergencies, dial 911. Now available from your iPhone and Android! Please provide this summary of care documentation to your next provider. Your primary care clinician is listed as Lolly Bruno. If you have any questions after today's visit, please call [phone].

## 2018-10-10 NOTE — PATIENT INSTRUCTIONS
If you have any questions or concerns about today's appointment, the verbal and/or written instructions you were given for follow up care, please call our office at [phone].     [facility] - [address]    [phone] office  [phone] fax    Appointment Wednesday, October 24, 2018 at 10:00am with Dr. Arsenio Leon located at [facility] [address] [place] [address], 130[address] [facility] (n) [phone]

## 2018-10-10 NOTE — PROGRESS NOTES
Chief Complaint   Patient presents with    Surgical Follow-up     Post op excisional biopsy of right breast 10.2. 18. Denies fever, chills,and pain. 1. Have you been to the ER, urgent care clinic since your last visit? Hospitalized since your last visit? No    2. Have you seen or consulted any other health care providers outside of the [address] [place] [address] since your last visit? Include any pap smears or colon screening.  No

## 2018-10-10 NOTE — PROGRESS NOTES
Ms. Kirti Braswell is a 70year old woman who was referred for right breast atypical ductal hyperplasia present in a complex sclerosing papillary lesion. The area of concern was identified on routine screening exam. She denies breast pain, mass or nipple discharge. She reports a history of benign breast biopsy. Her most recent previous mammogram was 7/26/17. There is not family history of breast cancer. Her sister has had multiple benign breast masses. I performed needle localized excision 10/22/18 demonstrated ADH. Breast/GYN history:    Past Medical History:   Diagnosis Date    Advance directive discussed with patient 09/06/2016    Dyslipidemia     H/O colonoscopy 08/12/14    Done by Dr. Janett Zayas: diverticular dz & hemorrhoids, repeat in 10 years    Hyperlipemia     Hypertension     Microcalcifications of the breast 2013    left breast s/p Vacuum-assisted core needle biopsy of the left breast with placement of localizing clip  6/18/2013. Being followed by Dr. Aron Rutherford. Suppose to have mammoram in 6 months. Path showed firbrocystic changes    PAOLA (obstructive sleep apnea) 03/02/2017    on CPAP machine. Being followed by Dr. Margaret Capellan    Sickle cell trait ([facility]     Vitamin D deficiency        Past Surgical History:   Procedure Laterality Date    HX BREAST BIOPSY Bilateral 1994    bilateral biopsy-[facility]    HX BREAST LUMPECTOMY Right 10/2/2018    RIGHT BREAST NEEDLE LOC EXCISIONAL BIOPSY [facility] [address] [facility]    unsure of exact date [id number], partial hysterectomy.  kept both ovaries    HX TONSIL AND ADENOIDECTOMY  1962    KNEE SCOPE,MED OR LAT [facility]  2006    right knee    ANIBAL BIOPSY BREAST STEREOTACTIC Left 2013    benign       Current Outpatient Prescriptions on File Prior to Visit   Medication Sig Dispense Refill    atorvastatin (LIPITOR) 10 mg tablet TAKE 1 TABLET BY MOUTH DAILY 90 Tab 3    cholecalciferol, vitamin D3, (VITAMIN D3) 2,000 unit tab Take 2,000 Int'l Units by mouth daily.  omega-3 fatty acids-vitamin e (FISH OIL) 1,000 mg cap Take 2 Caps by mouth daily.  iron 50 mg iron Tab Take 325 mg by mouth daily. Take one po daily      acetaminophen (TYLENOL ARTHRITIS PAIN) 650 mg CR tablet Take 650 mg by mouth every six (6) hours as needed.  HYDROcodone-acetaminophen (NORCO) 5-325 mg per tablet Take 1 Tab by mouth every six (6) hours as needed for Pain. Max Daily Amount: 4 Tabs. Limit acetaminophen from all sources to 4,000 mg/day 10 Tab 0     No current facility-administered medications on file prior to visit. Allergies   Allergen Reactions    Other Food Other (comments)     balsamic vinegar/vinegar: swelling in leg       Social History   Substance Use Topics    Smoking status: Never Smoker    Smokeless tobacco: Never Used      Comment: pt counseled to continue to not smoke.     Alcohol use 0.0 oz/week      Comment: occ       Family History   Problem Relation Age of Onset    Hypertension Mother     Diabetes Mother     Hypertension Father     Hypertension Maternal Grandmother     Hypertension Maternal Grandfather          ROS:   General: denies fevers, chills, night sweats, fatigue, weight loss, weight gain  GI: denies nausea, vomiting, abdominal pain, change in bowel habits, hematochezia, melena, GERD  Integ: denies dermatitis, abnormal moles  HEENT: denies visual changes, vertigo, epistaxis, dysphagia, hoarseness  Cardiac: denies chest pain, palpitations, HTN, edema, varicosities  Resp: denies cough, shortness of breath, wheezing, hemoptysis, snoring, reactive airway disease  : denies hematuria, dysuria, frequency, urgency, nocturia, stress urinary incontinence MSK: weakness, joint pain, arthritis  Endocrine: denies diabetes, thyroid disease, polyuria, polydipsia, polyphagia, poor wound healing, heat intolerance, cold intolerance  Lymph/Heme: denies anemia, bruising, history of blood transfusions  Neuro: denies dizziness, headache, fainting, seizures, stroke  Psych: denies anxiety, depression    Physical Exam:  Visit Vitals    /88 (BP 1 Location: Right arm, BP Patient Position: Sitting)    Pulse 76    Temp 98 °F (36.7 °C) (Oral)    Resp 16    Ht 5' 11\" (1.803 m)    Wt 83.3 kg (183 lb 9.6 oz)    BMI 25.61 kg/m2       Gen: Well developed, well nourished woman in no acute distress  Head: normocephalic, atraumatic  Mouth: Clear, no overt lesions, oral mucosa pink and moist  Neck: supple, no masses, no adenopathy, trachea midline  Resp: clear bilaterally  Cardio: Regular rate and rhythm  Breasts: incision clean, ecchymosis deferred today, previously: Examined in both the supine and upright positions. There was no supraclavicular, infraclavicular, or axillary lympadenopathy. There were no dominant masses, no skin changes, no asymmetry identified, well healed biopsy scar left breast, healed core biopsy site right breast  Abdomen: soft, nontender, nondistended  Extremeties: warm, well-perfused  Neuro: sensation and strength grossly intact and symmetrical  Psych: alert and oriented to person, place and time    Pathology:  10/22/18   RIGHT BREAST MASS, LUMPECTOMY:   ATYPICAL DUCTAL HYPERPLASIA, MARGINS UNINVOLVED. PROLIFERATIVE FIBROCYSTIC CHANGE. SCLEROSING ADENOSIS. BIOPSY SITE CHANGES. NEGATIVE FOR MALIGNANCY.     18  A. RIGHT BREAST, INNER MIDDLE ONE THIRD, BIOPSY:  - FOCAL ATYPICAL DUCTAL HYPERPLASIA PRESENT IN A COMPLEX SCLEROSING PAPILLARY LESION WITH ASSOCIATED CALCIFICATIONS. Imagin18: right mammogram ([facility])  IMPRESSION:  New suspicious linear heterogeneous calcifications in the inner right breast, middle one third for which stereotactic breast biopsy is recommended. These findings and recommendations were discussed with patient time of evaluation.     BI-RADS CATEGORY: BIRADS 4 : Suspicious    18 bilateral mammogram ([facility])  Right breast calcifications for which magnification and mediolateral views recommended. No mammographic evidence of malignancy left breast for which routine followup recommended. Category 0:  Incomplete--Need Additional Imaging Evaluation and/or Prior Mammograms for Comparison  Density B:  There are scattered areas of fibroglandular density. Impression:  Patient Active Problem List   Diagnosis Code    Dyslipidemia E78.5    Sickle cell trait (HCC) D57.3    Microcalcifications of the breast R92.0    Vitamin D deficiency E55.9    Fibrocystic breast changes N60.19    H/O colonoscopy Z98.890    Advance directive discussed with patient Z70.80    PAOLA (obstructive sleep apnea) G47.33    Atypical ductal hyperplasia of right breast N60.91          70year old woman with right breast atypical ductal present in a complex sclerosing papillary lesion s/p excision. We again discussed the results of atypical ductal hyperplasia (ADH). We reviewed that ADH is not considered cancer or pre-cancer, but rather a high risk marker. A woman with ADH may have a higher than average risk of developing breast cancer. I have recommended consultation with medical oncology for discussion of risk reduction. Follow up 3 months. All questions answered to her satisfaction. She was asked to call with any additional questions or concerns.

## 2019-01-09 ENCOUNTER — OFFICE VISIT (OUTPATIENT)
Dept: SURGERY | Age: 69
End: 2019-01-09

## 2019-01-09 VITALS
BODY MASS INDEX: 25.17 KG/M2 | HEIGHT: 71 IN | WEIGHT: 179.8 LBS | HEART RATE: 109 BPM | DIASTOLIC BLOOD PRESSURE: 80 MMHG | TEMPERATURE: 98.2 F | SYSTOLIC BLOOD PRESSURE: 125 MMHG

## 2019-01-09 DIAGNOSIS — N60.91 ATYPICAL DUCTAL HYPERPLASIA OF RIGHT BREAST: Primary | ICD-10-CM

## 2019-01-09 RX ORDER — RALOXIFENE HYDROCHLORIDE 60 MG/1
TABLET, FILM COATED ORAL DAILY
COMMUNITY
End: 2019-02-12

## 2019-01-09 NOTE — PROGRESS NOTES
Chief Complaint   Patient presents with    Follow-up     follow up breast exam for s/p right breast LOC exicisional biopsy. No complaints. 1. Have you been to the ER, urgent care clinic since your last visit? Hospitalized since your last visit? No    2. Have you seen or consulted any other health care providers outside of the [address] [place] [address] since your last visit? Include any pap smears or colon screening. No      Patient c/o Raloxifene causing a rash to right arm and itching over body and a knot formed on her forehead since taking this medication. Patient stopped taking medication.

## 2019-01-09 NOTE — PATIENT INSTRUCTIONS
If you have any questions or concerns about today's appointment, the verbal and/or written instructions you were given for follow up care, please call our office at [phone].     [facility] - [address]    [phone] office  [phone]

## 2019-01-09 NOTE — PROGRESS NOTES
Ms. Sydney Navarro is a 70year old woman who was referred for right breast atypical ductal hyperplasia present in a complex sclerosing papillary lesion. The area of concern was identified on routine screening exam. She denies breast pain, mass or nipple discharge. She reports a history of benign breast biopsy. Her most recent previous mammogram was 7/26/17. There is not family history of breast cancer. Her sister has had multiple benign breast masses. I performed needle localized excision 10/22/18 demonstrating ADH. She was prescribed Evista per Dr. Samir Kidd, however she self discontinued approximately 3 weeks ago as she developed a rash and thought her forehead lipoma increased in size. She reports improvement since discontinuing the Evista. Breast/GYN history:    Past Medical History:   Diagnosis Date    Advance directive discussed with patient 09/06/2016    Atypical ductal hyperplasia of right breast 2018    Being followed by Dr. Rolando Vivar.  Dyslipidemia     H/O colonoscopy 08/12/14    Done by Dr. Saige Sr: diverticular dz & hemorrhoids, repeat in 10 years    Hyperlipemia     Hypertension     Microcalcifications of the breast 2013    left breast s/p Vacuum-assisted core needle biopsy of the left breast with placement of localizing clip  6/18/2013. Being followed by Dr. Malia Gresham. Suppose to have mammoram in 6 months. Path showed firbrocystic changes    PAOLA (obstructive sleep apnea) 03/02/2017    on CPAP machine. Being followed by Dr. Yara Almaguer    Sickle cell trait ([facility] [address]     Vitamin D deficiency        Past Surgical History:   Procedure Laterality Date    HX BREAST BIOPSY Bilateral 1994    bilateral biopsy-[facility]    HX BREAST LUMPECTOMY Right 10/2/2018    RIGHT BREAST NEEDLE LOC EXCISIONAL BIOPSY performed by Yanira Crane MD at [address] [facility]    unsure of exact date [id number], partial hysterectomy.  kept both ovaries    HX TONSIL AND ADENOIDECTOMY  1962    KNEE SCOPE,MED OR LAT Nano  2006    right knee    ANIBAL BIOPSY BREAST STEREOTACTIC Left 2013    benign       Current Outpatient Medications on File Prior to Visit   Medication Sig Dispense Refill    raloxifene (EVISTA) 60 mg tablet Take  by mouth daily.  atorvastatin (LIPITOR) 10 mg tablet TAKE 1 TABLET BY MOUTH DAILY 90 Tab 3    cholecalciferol, vitamin D3, (VITAMIN D3) 2,000 unit tab Take 2,000 Int'l Units by mouth daily.  omega-3 fatty acids-vitamin e (FISH OIL) 1,000 mg cap Take 2 Caps by mouth daily.  iron 50 mg iron Tab Take 325 mg by mouth daily. Take one po daily      acetaminophen (TYLENOL ARTHRITIS PAIN) 650 mg CR tablet Take 650 mg by mouth every six (6) hours as needed.  HYDROcodone-acetaminophen (NORCO) 5-325 mg per tablet Take 1 Tab by mouth every six (6) hours as needed for Pain. Max Daily Amount: 4 Tabs. Limit acetaminophen from all sources to 4,000 mg/day 10 Tab 0     No current facility-administered medications on file prior to visit. Allergies   Allergen Reactions    Other Food Other (comments)     balsamic vinegar/vinegar: swelling in leg       Social History     Tobacco Use    Smoking status: Never Smoker    Smokeless tobacco: Never Used    Tobacco comment: pt counseled to continue to not smoke. Substance Use Topics    Alcohol use:  Yes     Alcohol/week: 0.0 oz     Comment: occ    Drug use: No       Family History   Problem Relation Age of Onset    Hypertension Mother     Diabetes Mother     Hypertension Father     Hypertension Maternal Grandmother     Hypertension Maternal Grandfather          ROS:   General: denies fevers, chills, night sweats, fatigue, weight loss, weight gain  GI: denies nausea, vomiting, abdominal pain, change in bowel habits, hematochezia, melena, GERD  Integ: denies dermatitis, abnormal moles  HEENT: denies visual changes, vertigo, epistaxis, dysphagia, hoarseness  Cardiac: denies chest pain, palpitations, HTN, edema, varicosities  Resp: denies cough, shortness of breath, wheezing, hemoptysis, snoring, reactive airway disease  : denies hematuria, dysuria, frequency, urgency, nocturia, stress urinary incontinence MSK: weakness, joint pain, arthritis  Endocrine: denies diabetes, thyroid disease, polyuria, polydipsia, polyphagia, poor wound healing, heat intolerance, cold intolerance  Lymph/Heme: denies anemia, bruising, history of blood transfusions  Neuro: denies dizziness, headache, fainting, seizures, stroke  Psych: denies anxiety, depression    Physical Exam:  Visit Vitals  /80 (BP 1 Location: Right arm, BP Patient Position: Sitting)   Pulse (!) 109   Temp 98.2 °F (36.8 °C) (Oral)   Ht 5' 11\" (1.803 m)   Wt 81.6 kg (179 lb 12.8 oz)   BMI 25.08 kg/m²       Gen: Well developed, well nourished woman in no acute distress  Head: normocephalic, atraumatic, smooth soft mass right forehead c/w lipoma  Mouth: Clear, no overt lesions, oral mucosa pink and moist  Neck: supple, no masses, no adenopathy, trachea midline  Resp: clear bilaterally  Cardio: Regular rate and rhythm  Breasts: incision clean, well healed Examined in both the supine and upright positions. There was no supraclavicular, infraclavicular, or axillary lympadenopathy. There were no dominant masses, no skin changes, no asymmetry identified, well healed biopsy scar left breast, healed core biopsy site right breast  Abdomen: soft, nontender, nondistended  Extremeties: warm, well-perfused  Neuro: sensation and strength grossly intact and symmetrical  Psych: alert and oriented to person, place and time    Pathology:  10/22/18   RIGHT BREAST MASS, LUMPECTOMY:   ATYPICAL DUCTAL HYPERPLASIA, MARGINS UNINVOLVED. PROLIFERATIVE FIBROCYSTIC CHANGE. SCLEROSING ADENOSIS. BIOPSY SITE CHANGES.    NEGATIVE FOR MALIGNANCY.     8/6/18  A. RIGHT BREAST, INNER MIDDLE ONE THIRD, BIOPSY:  - FOCAL ATYPICAL DUCTAL HYPERPLASIA PRESENT IN A COMPLEX SCLEROSING PAPILLARY LESION WITH ASSOCIATED CALCIFICATIONS. Imagin18: right mammogram ([facility])  IMPRESSION:  New suspicious linear heterogeneous calcifications in the inner right breast, middle one third for which stereotactic breast biopsy is recommended. These findings and recommendations were discussed with patient time of evaluation. BI-RADS CATEGORY: BIRADS 4 : Suspicious    18 bilateral mammogram ([facility])  Right breast calcifications for which magnification and mediolateral views recommended. No mammographic evidence of malignancy left breast for which routine followup recommended. Category 0:  Incomplete--Need Additional Imaging Evaluation and/or Prior Mammograms for Comparison  Density B:  There are scattered areas of fibroglandular density. Impression:  Patient Active Problem List   Diagnosis Code    Dyslipidemia E78.5    Sickle cell trait (HCC) D57.3    Microcalcifications of the breast R92.0    Vitamin D deficiency E55.9    Fibrocystic breast changes N60.19    H/O colonoscopy Z98.890    Advance directive discussed with patient Z70.80    PAOLA (obstructive sleep apnea) G47.33    Atypical ductal hyperplasia of right breast N60.91          70year old woman with right breast atypical ductal present in a complex sclerosing papillary lesion s/p excision. I have recommended she discuss her concerns regarding Evista with Dr. Ely Akers. She is due for bilateral mammogram 2019. Follow up after imaging. All questions answered to her satisfaction. She was asked to call with any additional questions or concerns.

## 2019-01-09 NOTE — LETTER
1/9/2019 2:34 PM 
 
Patient:  Gudelia Bryson YOB: 1950 Date of Visit: 1/9/2019 Kiya Sylvester MD 
[address] [place] [address] VIA In Basket Tanya Carson MD 
[address] [place] [address] VIA Facsimile: [phone] Dear MD Tanya Segura MD, 
 
 
I had the pleasure of seeing Ms. Yenny Bell in my office today for follow up of her right breast atypical ductal hyperplasia. I am including a copy of my office visit today. If you have questions, please do not hesitate to call me. I look forward to following Ms. Juan J Driscoll along with you and will keep you updated as to her progress. Sincerely, Светлана Harris MD

## 2019-02-11 DIAGNOSIS — E78.5 DYSLIPIDEMIA: ICD-10-CM

## 2019-02-11 RX ORDER — ATORVASTATIN CALCIUM 10 MG/1
TABLET, FILM COATED ORAL
Qty: 90 TAB | Refills: 3 | OUTPATIENT
Start: 2019-02-11

## 2019-02-11 NOTE — TELEPHONE ENCOUNTER
Denied:    Requested Prescriptions     Refused Prescriptions Disp Refills    atorvastatin (LIPITOR) 10 mg tablet 90 Tab 3     Sig: TAKE 1 TABLET BY MOUTH DAILY     Refused By: Liza Sever     Reason for Refusal: Appt required, please call patient     Last seen here on 4/2018. Needs OV.

## 2019-02-11 NOTE — TELEPHONE ENCOUNTER
Outgoing call to patient this morning. Two patient identifiers confirmed.  Patient has an appointment Tuesday, February 12, 2019 at 8:30 AM.

## 2019-02-12 ENCOUNTER — OFFICE VISIT (OUTPATIENT)
Dept: INTERNAL MEDICINE CLINIC | Age: 69
End: 2019-02-12

## 2019-02-12 ENCOUNTER — HOSPITAL ENCOUNTER (OUTPATIENT)
Dept: LAB | Age: 69
Discharge: HOME | End: 2019-02-12
Payer: MEDICARE

## 2019-02-12 VITALS
HEART RATE: 70 BPM | TEMPERATURE: 97.2 F | BODY MASS INDEX: 24.78 KG/M2 | RESPIRATION RATE: 16 BRPM | HEIGHT: 71 IN | OXYGEN SATURATION: 100 % | DIASTOLIC BLOOD PRESSURE: 88 MMHG | WEIGHT: 177 LBS | SYSTOLIC BLOOD PRESSURE: 142 MMHG

## 2019-02-12 DIAGNOSIS — I10 BENIGN HYPERTENSION WITHOUT CHF: ICD-10-CM

## 2019-02-12 DIAGNOSIS — E78.5 DYSLIPIDEMIA: ICD-10-CM

## 2019-02-12 DIAGNOSIS — E78.5 DYSLIPIDEMIA: Primary | ICD-10-CM

## 2019-02-12 DIAGNOSIS — M79.644 FINGER PAIN, RIGHT: ICD-10-CM

## 2019-02-12 LAB
ALBUMIN SERPL-MCNC: 3.8 G/DL (ref 3.4–5)
ALBUMIN/GLOB SERPL: 1.2 {RATIO} (ref 0.8–1.7)
ALP SERPL-CCNC: 99 U/L (ref 45–117)
ALT SERPL-CCNC: 23 U/L (ref 13–56)
ANION GAP SERPL CALC-SCNC: 7 MMOL/L (ref 3–18)
AST SERPL-CCNC: 18 U/L (ref 15–37)
BILIRUB SERPL-MCNC: 0.5 MG/DL (ref 0.2–1)
BUN SERPL-MCNC: 12 MG/DL (ref 7–18)
BUN/CREAT SERPL: 24 (ref 12–20)
CALCIUM SERPL-MCNC: 9.4 MG/DL (ref 8.5–10.1)
CHLORIDE SERPL-SCNC: 106 MMOL/L (ref 100–108)
CHOLEST SERPL-MCNC: 194 MG/DL
CO2 SERPL-SCNC: 30 MMOL/L (ref 21–32)
CREAT SERPL-MCNC: 0.5 MG/DL (ref 0.6–1.3)
ERYTHROCYTE [DISTWIDTH] IN BLOOD BY AUTOMATED COUNT: 14 % (ref 11.6–14.5)
GLOBULIN SER CALC-MCNC: 3.1 G/DL (ref 2–4)
GLUCOSE SERPL-MCNC: 103 MG/DL (ref 74–99)
HCT VFR BLD AUTO: 38.2 % (ref 35–45)
HDLC SERPL-MCNC: 99 MG/DL (ref 40–60)
HDLC SERPL: 2 {RATIO} (ref 0–5)
HGB BLD-MCNC: 12.9 G/DL (ref 12–16)
LDLC SERPL CALC-MCNC: 83.4 MG/DL (ref 0–100)
LIPID PROFILE,FLP: ABNORMAL
MCH RBC QN AUTO: 29.7 PG (ref 24–34)
MCHC RBC AUTO-ENTMCNC: 33.8 G/DL (ref 31–37)
MCV RBC AUTO: 88 FL (ref 74–97)
PLATELET # BLD AUTO: 332 K/UL (ref 135–420)
PMV BLD AUTO: 10.2 FL (ref 9.2–11.8)
POTASSIUM SERPL-SCNC: 3.5 MMOL/L (ref 3.5–5.5)
PROT SERPL-MCNC: 6.9 G/DL (ref 6.4–8.2)
RBC # BLD AUTO: 4.34 M/UL (ref 4.2–5.3)
SODIUM SERPL-SCNC: 143 MMOL/L (ref 136–145)
TRIGL SERPL-MCNC: 58 MG/DL (ref ?–150)
VLDLC SERPL CALC-MCNC: 11.6 MG/DL
WBC # BLD AUTO: 4 K/UL (ref 4.6–13.2)

## 2019-02-12 PROCEDURE — 80061 LIPID PANEL: CPT

## 2019-02-12 PROCEDURE — 85027 COMPLETE CBC AUTOMATED: CPT

## 2019-02-12 PROCEDURE — 80053 COMPREHEN METABOLIC PANEL: CPT

## 2019-02-12 RX ORDER — ATORVASTATIN CALCIUM 10 MG/1
TABLET, FILM COATED ORAL
Qty: 90 TAB | Refills: 3 | Status: SHIPPED | OUTPATIENT
Start: 2019-02-12 | End: 2019-12-23 | Stop reason: SDUPTHER

## 2019-02-12 NOTE — PROGRESS NOTES
Chief Complaint Patient presents with  Hypertension  
  follow up  Cholesterol Problem  
  follow up  Medication Evaluation Immunization update HPI:  
 
Michael Joseph is a 70 y.o.  female with history of  Dyslipidemia, hypertension, and dyslipidemia and atypical ductal hyperplasia of right breast here for the above complaint. She is seeing Dr. Lyudmila Glaarza for her right breast atypical ductal hyperplasia. She stopped the evista due to rash under her abdomen. It also came on her right arm. She denies any chest pain, shortness of breath, abdominal pain, headaches or dizziness. She has been taking sudafed for the last couple of days. She took coriciden last night. No fevers, chills, night sweats. Told her to stay with the coriceden HB for her cold symptoms and not take sudafed due to HTN. Orthopedics:  Dr. Juan F Alexis Close and [address] [place] [address]) She said several months ago hit her 3rd right finger in the drawer. There is swelling at the DIP joint. Nail little bluish. Past Medical History:  
Diagnosis Date  Advance directive discussed with patient 09/06/2016  Atypical ductal hyperplasia of right breast 2018 Being followed by Dr. Arturo Raymond.  Dyslipidemia  H/O colonoscopy 08/12/14 Done by Dr. Morelia Oneal: diverticular dz & hemorrhoids, repeat in 10 years  Hyperlipemia  Hypertension  Microcalcifications of the breast 2013  
 left breast s/p Vacuum-assisted core needle biopsy of the left breast with placement of localizing clip  6/18/2013. Being followed by Dr. Jamee Goltz. Suppose to have mammoram in 6 months. Path showed firbrocystic changes  PAOLA (obstructive sleep apnea) 03/02/2017 on CPAP machine. Being followed by Dr. Steph Jade  Sickle cell trait ([facility] [address]  Vitamin D deficiency Past Surgical History:  
Procedure Laterality Date  HX BREAST BIOPSY Bilateral 1994  
 bilateral biopsy-[facility]  
  HX BREAST LUMPECTOMY Right 10/2/2018 RIGHT BREAST NEEDLE LOC EXCISIONAL BIOPSY performed by Vannesa Marinelli MD at [address] [facility] [address] [place] [address]  
 unsure of exact date [id number], partial hysterectomy. kept both ovaries [address]  KNEE SCOPE,MED OR LAT MENIS REPAIR  2006  
 right knee  ANIBAL BIOPSY BREAST STEREOTACTIC Left 2013  
 benign Current Outpatient Medications Medication Sig  
 atorvastatin (LIPITOR) 10 mg tablet TAKE 1 TABLET BY MOUTH DAILY  cholecalciferol, vitamin D3, (VITAMIN D3) 2,000 unit tab Take 2,000 Int'l Units by mouth daily.  omega-3 fatty acids-vitamin e (FISH OIL) 1,000 mg cap Take 2 Caps by mouth daily.  iron 50 mg iron Tab Take 325 mg by mouth daily. Take one po daily  acetaminophen (TYLENOL ARTHRITIS PAIN) 650 mg CR tablet Take 650 mg by mouth every six (6) hours as needed. No current facility-administered medications for this visit. Health Maintenance Topic Date Due  Shingrix Vaccine Age 50> (1 of 2) 03/10/1999  Pneumococcal 65+ Low/Medium Risk (2 of 2 - PPSV23) 06/10/2018  MEDICARE YEARLY EXAM  09/12/2018  GLAUCOMA SCREENING Q2Y  01/19/2020  BREAST CANCER SCRN MAMMOGRAM  07/31/2020  COLONOSCOPY  08/12/2024  
 DTaP/Tdap/Td series (2 - Td) 03/12/2026  Hepatitis C Screening  Completed  Bone Densitometry (Dexa) Screening  Completed  Influenza Age 5 to Adult  Completed Immunization History Administered Date(s) Administered  Hep A Vaccine 04/13/2018  Influenza High Dose Vaccine PF 09/27/2015, 09/11/2017, 10/24/2018  Pneumococcal Conjugate (PCV-13) 04/25/2016  Pneumococcal Vaccine (Unspecified Type) 06/10/2013  Tdap 03/12/2016  Typhoid Vi Capsular Polysaccharide Vaccine 04/13/2018 No LMP recorded. Patient has had a hysterectomy. Allergies and Intolerances: Allergies Allergen Reactions  Other Food Other (comments) balsamic vinegar/vinegar: swelling in leg Family History:  
Family History Problem Relation Age of Onset  Hypertension Mother  Diabetes Mother  Hypertension Father  Hypertension Maternal Grandmother  Hypertension Maternal Grandfather Social History: She  reports that  has never smoked. she has never used smokeless tobacco.  She  reports that she drinks alcohol. ·  
 
OBJECTIVE:  
Physical exam:  
Visit Vitals /88 (BP 1 Location: Right arm, BP Patient Position: Sitting) Comment: She has been taking sudafed for the last couple of days. Pulse 70 Temp 97.2 °F (36.2 °C) (Oral) Resp 16 Ht 5' 11\" (1.803 m) Wt 177 lb (80.3 kg) SpO2 100% BMI 24.69 kg/m² Generally: Pleasant female in no acute distress HEENT Exam: Head: atraumatic, acephalic Eyes: PERRLA Ears: bilaterally no erythema or exudate, normal light reflex, but TM dull and fluid behind left ear. left ear has dome ear wax. She can use debrox for the ear wax. Cardiac Exam: regular, rate, and rhythm. Normal S1 and S2. No murmurs, gallops, or rubs Pulmonary exam: Clear to auscultation bilaterally Abdominal exam: Positive bowel sounds in all four quadrants, soft, nondistended, nontender Extremities: 2+ dorsalis pedis pulses bilaterally. No pedal edema  
 bilaterally 3rd right finger: some swelling at the DIP joint and good ROM. No TTP. Nail little blue, but this has been going on for several months. LABS/RADIOLOGICAL TESTS: 
Lab Results Component Value Date/Time WBC 4.9 09/20/2018 12:37 PM  
 HGB 13.0 09/20/2018 12:37 PM  
 HCT 38.4 09/20/2018 12:37 PM  
 PLATELET 172 06/25/9811 12:37 PM  
 
Lab Results Component Value Date/Time  Sodium 143 09/20/2018 12:37 PM  
 Potassium 4.3 09/20/2018 12:37 PM  
 Chloride 107 09/20/2018 12:37 PM  
 CO2 30 09/20/2018 12:37 PM  
 Glucose 78 09/20/2018 12:37 PM  
 BUN 11 09/20/2018 12:37 PM  
 Creatinine 0.59 (L) 09/20/2018 12:37 PM  
 
Lab Results Component Value Date/Time Cholesterol, total 233 (H) 04/19/2018 10:15 AM  
 HDL Cholesterol 112 (H) 04/19/2018 10:15 AM  
 LDL, calculated 106.2 (H) 04/19/2018 10:15 AM  
 Triglyceride 74 04/19/2018 10:15 AM  
 
No results found for: GPT Previous labs ASSESSMENT/PLAN:   
1. Dyslipidemia:we will see what the labs show. Continue the lipitor, diet, fish oil and exercise. -     METABOLIC PANEL, COMPREHENSIVE; Future -     LIPID PANEL; Future 
-     atorvastatin (LIPITOR) 10 mg tablet; TAKE 1 TABLET BY MOUTH DAILY 2. Benign hypertension without CHF: not well controlled as she has been taking sudafed. Continue the diet and exercise and we will monitor.  
-     CBC W/O DIFF; Future 3. Finger pain, right 
-     XR 3RD FINGER RT MIN 2 V; Future 4. Requested Prescriptions Signed Prescriptions Disp Refills  atorvastatin (LIPITOR) 10 mg tablet 90 Tab 3 Sig: TAKE 1 TABLET BY MOUTH DAILY 5. Patient verbalized understanding and agreement with the plan. 6. Patient was given an after-visit summary. 7. Follow-up Disposition: 
Return in about 4 months (around 6/12/2019) for f/u lipids. or sooner if worsening symptoms.  
 
 
 
 
Hernesto Willis M.D.

## 2019-02-12 NOTE — PROGRESS NOTES
ROOM # 1 Pk Patel presents today for Chief Complaint Patient presents with  Hypertension  
  follow up  Cholesterol Problem  
  follow up Pk Patel preferred language for health care discussion is english/other. Is someone accompanying this pt? No 
 
Is the patient using any DME equipment during OV? No 
 
Depression Screening: 
3 most recent [facility] Screens 2/12/2019 4/19/2018 12/19/2017 9/11/2017 5/9/2017 5/9/2017 1/5/2017 Little interest or pleasure in doing things Not at all Not at all Not at all Not at all Not at all Not at all Not at all Feeling down, depressed, irritable, or hopeless Not at all Not at all Not at all Not at all Not at all - Not at all Total Score PHQ 2 0 0 0 0 0 - 0 Learning Assessment: 
Learning Assessment 3/9/2015 1/28/2014 PRIMARY LEARNER Patient Patient HIGHEST LEVEL OF EDUCATION - PRIMARY LEARNER  > 4 YEARS OF COLLEGE -  
PRIMARY LANGUAGE ENGLISH ENGLISH  
LEARNER PREFERENCE PRIMARY DEMONSTRATION READING  
ANSWERED BY Patient pt RELATIONSHIP SELF SELF Abuse Screening: 
Abuse Screening Questionnaire 9/11/2017 5/9/2017 11/25/2015 Do you ever feel afraid of your partner? N N N Are you in a relationship with someone who physically or mentally threatens you? N N N Is it safe for you to go home? Yvette Landeros Fall Risk Fall Risk Assessment, last 12 mths 2/12/2019 4/19/2018 12/19/2017 9/11/2017 5/9/2017 1/5/2017 9/6/2016 Able to walk? Yes Yes Yes Yes Yes Yes Yes Fall in past 12 months? No No Yes Yes Yes No No  
Fall with injury? - - No No No - - Number of falls in past 12 months - - 5 2 1 - - Fall Risk Score - - 5 2 1 - - Health Maintenance reviewed and discussed per provider. Yes Pk Patel is due for Health Maintenance Due Topic Date Due  Shingrix Vaccine Age 50> (1 of 2) 03/10/1999  Pneumococcal 65+ Low/Medium Risk (2 of 2 - PPSV23) 06/10/2018  Influenza Age 5 to Adult  08/01/2018  MEDICARE YEARLY EXAM  09/12/2018 Please order/place referral if appropriate. Advance Directive: 1. Do you have an advance directive in place? Patient Reply: No 
 
2. If not, would you like material regarding how to put one in place? Patient Reply: No 
 
Coordination of Care: 1. Have you been to the ER, urgent care clinic since your last visit? Hospitalized since your last visit? No 
 
2. Have you seen or consulted any other health care providers outside of the [address] [place] [address] since your last visit? Include any pap smears or colon screening.  No

## 2019-02-12 NOTE — PATIENT INSTRUCTIONS
1) Use debrox over the counter for ear wax removal.  
 
2) Follow-up in 4 months or sooner if worsening symptoms. Earwax Blockage: Care Instructions Your Care Instructions Earwax is a natural substance that protects the ear canal. Normally, earwax drains from the ears and does not cause problems. Sometimes earwax builds up and hardens. Earwax blockage (also called cerumen impaction) can cause some loss of hearing and pain. When wax is tightly packed, you will need to have your doctor remove it. Follow-up care is a key part of your treatment and safety. Be sure to make and go to all appointments, and call your doctor if you are having problems. It's also a good idea to know your test results and keep a list of the medicines you take. How can you care for yourself at home? · Do not try to remove earwax with cotton swabs, fingers, or other objects. This can make the blockage worse and damage the eardrum. · If your doctor recommends that you try to remove earwax at home: ? Soften and loosen the earwax with warm mineral oil. You also can try hydrogen peroxide mixed with an equal amount of room temperature water. Place 2 drops of the fluid, warmed to body temperature, in the ear two times a day for up to 5 days. ? Once the wax is loose and soft, all that is usually needed to remove it from the ear canal is a gentle, warm shower. Direct the water into the ear, then tip your head to let the earwax drain out. Dry your ear thoroughly with a hair dryer set on low. Hold the dryer several inches from your ear. ? If the warm mineral oil and shower do not work, use an over-the-counter wax softener. Read and follow all instructions on the label. After using the wax softener, use an ear syringe to gently flush the ear. Make sure the flushing solution is body temperature. Cool or hot fluids in the ear can cause dizziness. When should you call for help? Call your doctor now or seek immediate medical care if:   · Pus or blood drains from your ear.  
  · Your ears are ringing or feel full.  
  · You have a loss of hearing.  
 Watch closely for changes in your health, and be sure to contact your doctor if: 
  · You have pain or reduced hearing after 1 week of home treatment.  
  · You have any new symptoms, such as nausea or balance problems. Where can you learn more? Go to http://sheila-esdras.info/. Enter X809 in the search box to learn more about \"Earwax Blockage: Care Instructions. \" Current as of: September 23, 2018 Content Version: 11.9 © 6487-1671 [employer]. Care instructions adapted under license by [employer] (which disclaims liability or warranty for this information). If you have questions about a medical condition or this instruction, always ask your healthcare professional. [address] any warranty or liability for your use of this information.

## 2019-02-12 NOTE — PROGRESS NOTES
Pharmacy Note: Immunization Update Patient: Jerry Allan (48 y.o., 1950) Patient's immunization history was updated to reflect information contained in the [place] and/or outside immunization/pharmacy records were reconciled within [facility]. Health Maintenance schedule updated when appropriate. Current immunizations now reflect: 
 
  
Immunization History Administered Date(s) Administered  Hep A Vaccine 04/13/2018  Influenza High Dose Vaccine PF 09/27/2015, 09/11/2017, 10/24/2018  Pneumococcal Conjugate (PCV-13) 04/25/2016  Pneumococcal Vaccine (Unspecified Type) 06/10/2013  Tdap 03/12/2016  Typhoid Vi Capsular Polysaccharide Vaccine 04/13/2018 Roney Gonzales, RoniD, BCACP

## 2019-02-13 DIAGNOSIS — Z12.39 BREAST CANCER SCREENING: Primary | ICD-10-CM

## 2019-02-21 ENCOUNTER — TELEPHONE (OUTPATIENT)
Dept: INTERNAL MEDICINE CLINIC | Age: 69
End: 2019-02-21

## 2019-02-21 DIAGNOSIS — M79.644 FINGER PAIN, RIGHT: Primary | ICD-10-CM

## 2019-03-19 ENCOUNTER — OFFICE VISIT (OUTPATIENT)
Dept: INTERNAL MEDICINE CLINIC | Age: 69
End: 2019-03-19

## 2019-03-19 VITALS
TEMPERATURE: 97.4 F | SYSTOLIC BLOOD PRESSURE: 128 MMHG | HEART RATE: 68 BPM | OXYGEN SATURATION: 100 % | RESPIRATION RATE: 16 BRPM | HEIGHT: 71 IN | WEIGHT: 171.2 LBS | BODY MASS INDEX: 23.97 KG/M2 | DIASTOLIC BLOOD PRESSURE: 89 MMHG

## 2019-03-19 DIAGNOSIS — B37.9 CANDIDA ALBICANS INFECTION: Primary | ICD-10-CM

## 2019-03-19 RX ORDER — NYSTATIN 100000 U/G
CREAM TOPICAL 2 TIMES DAILY
Qty: 30 G | Refills: 1 | Status: SHIPPED | OUTPATIENT
Start: 2019-03-19 | End: 2019-04-09 | Stop reason: SDUPTHER

## 2019-03-19 RX ORDER — RALOXIFENE HYDROCHLORIDE 60 MG/1
TABLET, FILM COATED ORAL
COMMUNITY
Start: 2019-01-04 | End: 2019-03-19

## 2019-03-19 NOTE — PROGRESS NOTES
Chief Complaint   Patient presents with    Rash     underneath abd, itchy. HPI:     Marcus Dominguez is a 78 y.o.  female with history of dyslipidemia and hypertension  here for the above complaint. She has rash in lower abdominal area for couple a months. She has itching. She has been using neosporin and another cream which she does not remember the name. She said they both did not help. She denies any chest pain, shortness of breath, abdominal pain, headaches or dizziness. Past Medical History:   Diagnosis Date    Advance directive discussed with patient 09/06/2016    Atypical ductal hyperplasia of right breast 2018    Being followed by Dr. Chris Moore.  Dyslipidemia     H/O colonoscopy 08/12/14    Done by Dr. Carlos A Rousseau: diverticular dz & hemorrhoids, repeat in 10 years    Hyperlipemia     Hypertension     Microcalcifications of the breast 2013    left breast s/p Vacuum-assisted core needle biopsy of the left breast with placement of localizing clip  6/18/2013. Being followed by Dr. Jeanne Ashford. Suppose to have mammoram in 6 months. Path showed firbrocystic changes    PAOLA (obstructive sleep apnea) 03/02/2017    on CPAP machine. Being followed by Dr. Lety Butcher    Sickle cell trait ([facility] [address]     Vitamin D deficiency      Past Surgical History:   Procedure Laterality Date    HX BREAST BIOPSY Bilateral 1994    bilateral biopsy-[facility]    HX BREAST LUMPECTOMY Right 10/2/2018    RIGHT BREAST NEEDLE LOC EXCISIONAL BIOPSY performed by Angeles Meza MD at [address]    unsure of exact date [id number], partial hysterectomy. kept both ovaries    HX TONSIL AND ADENOIDECTOMY  1962    KNEE SCOPE,MED OR LAT Nano  2006    right knee    ANIBAL BIOPSY BREAST STEREOTACTIC Left 2013    benign     Current Outpatient Medications   Medication Sig    nystatin (MYCOSTATIN) topical cream Apply  to affected area two (2) times a day.  For maximum of 2 weeks    atorvastatin (LIPITOR) 10 mg tablet TAKE 1 TABLET BY MOUTH DAILY    cholecalciferol, vitamin D3, (VITAMIN D3) 2,000 unit tab Take 2,000 Int'l Units by mouth daily.  omega-3 fatty acids-vitamin e (FISH OIL) 1,000 mg cap Take 2 Caps by mouth daily.  iron 50 mg iron Tab Take 325 mg by mouth daily. Take one po daily    acetaminophen (TYLENOL ARTHRITIS PAIN) 650 mg CR tablet Take 650 mg by mouth every six (6) hours as needed. No current facility-administered medications for this visit. Health Maintenance   Topic Date Due    Shingrix Vaccine Age 49> (1 of 2) 03/10/1999    Pneumococcal 65+ Low/Medium Risk (2 of 2 - PPSV23) 06/10/2018    MEDICARE YEARLY EXAM  09/12/2018    GLAUCOMA SCREENING Q2Y  01/19/2020    BREAST CANCER SCRN MAMMOGRAM  07/31/2020    COLONOSCOPY  08/12/2024    DTaP/Tdap/Td series (2 - Td) 03/12/2026    Hepatitis C Screening  Completed    Bone Densitometry (Dexa) Screening  Completed    Influenza Age 5 to Adult  Completed     Immunization History   Administered Date(s) Administered    Hep A Vaccine 04/13/2018    Influenza High Dose Vaccine PF 09/27/2015, 09/11/2017, 10/24/2018    Pneumococcal Conjugate (PCV-13) 04/25/2016    Pneumococcal Vaccine (Unspecified Type) 06/10/2013    Tdap 03/12/2016    Typhoid Vi Capsular Polysaccharide Vaccine 04/13/2018     No LMP recorded. Patient has had a hysterectomy. Allergies and Intolerances: Allergies   Allergen Reactions    Other Food Other (comments)     balsamic vinegar/vinegar: swelling in leg       Family History:   Family History   Problem Relation Age of Onset    Hypertension Mother     Diabetes Mother     Hypertension Father     Hypertension Maternal Grandmother     Hypertension Maternal Grandfather        Social History:   She  reports that  has never smoked. she has never used smokeless tobacco.  She  reports that she drinks alcohol.           ·     OBJECTIVE:   Physical exam:   Visit Vitals  /89 (BP 1 Location: Left arm, BP Patient Position: Sitting)   Pulse 68   Temp 97.4 °F (36.3 °C) (Oral)   Resp 16   Ht 5' 11\" (1.803 m)   Wt 171 lb 3.2 oz (77.7 kg)   SpO2 100%   BMI 23.88 kg/m²        Generally: Pleasant female in no acute distress  Skin: lower abdominal area is whitish erythematous rash that looks like candida. LABS/RADIOLOGICAL TESTS:  Lab Results   Component Value Date/Time    WBC 4.0 (L) 02/12/2019 09:00 AM    HGB 12.9 02/12/2019 09:00 AM    HCT 38.2 02/12/2019 09:00 AM    PLATELET 511 93/72/3687 09:00 AM     Lab Results   Component Value Date/Time    Sodium 143 02/12/2019 09:00 AM    Potassium 3.5 02/12/2019 09:00 AM    Chloride 106 02/12/2019 09:00 AM    CO2 30 02/12/2019 09:00 AM    Glucose 103 (H) 02/12/2019 09:00 AM    BUN 12 02/12/2019 09:00 AM    Creatinine 0.50 (L) 02/12/2019 09:00 AM     Lab Results   Component Value Date/Time    Cholesterol, total 194 02/12/2019 09:00 AM    HDL Cholesterol 99 (H) 02/12/2019 09:00 AM    LDL, calculated 83.4 02/12/2019 09:00 AM    Triglyceride 58 02/12/2019 09:00 AM     No results found for: GPT    Previous labs    ASSESSMENT/PLAN:    1. Candida albicans infection  -     nystatin (MYCOSTATIN) topical cream; Apply  to affected area two (2) times a day. For maximum of 2 weeks  -she will keep area dry. - She does a lot of layers, so most likely the cause of the rash. 2.   Requested Prescriptions     Signed Prescriptions Disp Refills    nystatin (MYCOSTATIN) topical cream 30 g 1     Sig: Apply  to affected area two (2) times a day. For maximum of 2 weeks     3. Patient verbalized understanding and agreement with the plan. 4. Patient was given an after-visit summary. 5. Follow-up Disposition:  Return if symptoms worsen or fail to improve. or sooner if worsening symptoms.           Gonzalo Reynolds M.D.

## 2019-03-19 NOTE — PROGRESS NOTES
ROOM # 1    Michael Joseph presents today for   Chief Complaint   Patient presents with    Rash     underneath abd, itchy. Michael Joseph preferred language for health care discussion is english/other. Is someone accompanying this pt? no    Is the patient using any DME equipment during OV? no    Depression Screening:  3 most recent [facility] Screens 2/12/2019 4/19/2018 12/19/2017 9/11/2017 5/9/2017 5/9/2017 1/5/2017   Little interest or pleasure in doing things Not at all Not at all Not at all Not at all Not at all Not at all Not at all   Feeling down, depressed, irritable, or hopeless Not at all Not at all Not at all Not at all Not at all - Not at all   Total Score PHQ 2 0 0 0 0 0 - 0       Learning Assessment:  Learning Assessment 3/9/2015 1/28/2014   PRIMARY LEARNER Patient Patient   HIGHEST LEVEL OF EDUCATION - PRIMARY LEARNER  > 4 YEARS [employer] Extension   LEARNER PREFERENCE PRIMARY DEMONSTRATION READING   ANSWERED BY Patient pt   RELATIONSHIP SELF SELF       Abuse Screening:  Abuse Screening Questionnaire 3/19/2019 9/11/2017 5/9/2017 11/25/2015   Do you ever feel afraid of your partner? N N N N   Are you in a relationship with someone who physically or mentally threatens you? N N N N   Is it safe for you to go home? Leopoldo Zhou       Fall Risk  Fall Risk Assessment, last 12 mths 2/12/2019 4/19/2018 12/19/2017 9/11/2017 5/9/2017 1/5/2017 9/6/2016   Able to walk? Yes Yes Yes Yes Yes Yes Yes   Fall in past 12 months? No No Yes Yes Yes No No   Fall with injury? - - No No No - -   Number of falls in past 12 months - - 5 2 1 - -   Fall Risk Score - - 5 2 1 - -       Health Maintenance reviewed and discussed per provider.  Yes    Michael Joseph is due for   Health Maintenance Due   Topic Date Due    Shingrix Vaccine Age 50> (1 of 2) 03/10/1999    Pneumococcal 65+ Low/Medium Risk (2 of 2 - PPSV23) 06/10/2018    MEDICARE YEARLY EXAM  09/12/2018   HM to be d/w provider      Please order/place referral if appropriate. Advance Directive:  1. Do you have an advance directive in place? Patient Reply: no    2. If not, would you like material regarding how to put one in place? Patient Reply: no    Coordination of Care:  1. Have you been to the ER, urgent care clinic since your last visit? Hospitalized since your last visit? no    2. Have you seen or consulted any other health care providers outside of the [address] [place] [address] since your last visit? Include any pap smears or colon screening.  no

## 2019-03-19 NOTE — PATIENT INSTRUCTIONS
1) Follow-up as needed or sooner if worsening symptoms. Candidiasis: Care Instructions  Your Care Instructions  Candidiasis (say \"mvl-hgr-JW-uh-chiquita\") is a yeast infection. Yeast normally lives in your body. But it can cause problems if your body's defenses don't work as they should. Some medicines can increase your chance of getting a yeast infection. These include antibiotics, steroids, and cancer drugs. And some diseases like AIDS and diabetes can make you more likely to get yeast infections. There are different types of yeast infections. Mago Keepers is a yeast infection in the mouth. It usually occurs in people with weak immune systems. It causes white patches inside the mouth and throat. Yeast infections of the skin usually occur in skin folds where the skin stays moist. They cause red, oozing patches on your skin. Babies can get these infections under the diaper. People who often wear gloves can get them on their hands. Many women get vaginal yeast infections. They are most common when women take antibiotics. These infections can cause the vagina to itch and burn. They also cause white discharge that looks like cottage cheese. In rare cases, yeast infects the blood. This can cause serious disease. This kind of infection is treated with medicine given through a needle into a vein (IV). After you start treatment, a yeast infection usually goes away quickly. But if your immune system is weak, the infection may come back. Tell your doctor if you get yeast infections often. Follow-up care is a key part of your treatment and safety. Be sure to make and go to all appointments, and call your doctor if you are having problems. It's also a good idea to know your test results and keep a list of the medicines you take. How can you care for yourself at home? · Take your medicines exactly as prescribed. Call your doctor if you think you are having a problem with your medicine.   · Use antibiotics only as directed by your doctor. · Eat yogurt with live cultures. It has bacteria called lactobacillus. It may help prevent some types of yeast infections. · Keep your skin clean and dry. Put powder on moist places. · If you are using a cream or suppository to treat a vaginal yeast infection, don't use condoms or a diaphragm. Use a different type of birth control. · Eat a healthy diet and get regular exercise. This will help keep your immune system strong. When should you call for help? Watch closely for changes in your health, and be sure to contact your doctor if:    · You do not get better as expected. Where can you learn more? Go to http://sheila-esdras.info/. Enter R738 in the search box to learn more about \"Candidiasis: Care Instructions. \"  Current as of: May 14, 2018  Content Version: 11.9  © 3767-2487 [employer]. Care instructions adapted under license by [employer] (which disclaims liability or warranty for this information). If you have questions about a medical condition or this instruction, always ask your healthcare professional. [address] any warranty or liability for your use of this information.

## 2019-04-09 DIAGNOSIS — B37.9 CANDIDA ALBICANS INFECTION: ICD-10-CM

## 2019-04-09 RX ORDER — NYSTATIN 100000 U/G
CREAM TOPICAL 2 TIMES DAILY
Qty: 30 G | Refills: 1 | Status: SHIPPED | OUTPATIENT
Start: 2019-04-09 | End: 2019-12-23

## 2019-04-09 NOTE — TELEPHONE ENCOUNTER
Patient request for a refill, states the rash is back. Requested Prescriptions     Pending Prescriptions Disp Refills    nystatin (MYCOSTATIN) topical cream 30 g 1     Sig: Apply  to affected area two (2) times a day.  For maximum of 2 weeks

## 2019-05-04 ENCOUNTER — TELEPHONE (OUTPATIENT)
Dept: INTERNAL MEDICINE CLINIC | Age: 69
End: 2019-05-04

## 2019-05-04 NOTE — TELEPHONE ENCOUNTER
Patient contacted at home number. 2 patient identifiers confirmed. Patient wanted information from her previous lumpectomy in 10/2018. Patient informed of previous information received. No other questions or concerns at this time.

## 2019-05-04 NOTE — TELEPHONE ENCOUNTER
Please return call to patient she has questions regarding her breast please return call when available

## 2019-06-12 ENCOUNTER — OFFICE VISIT (OUTPATIENT)
Dept: INTERNAL MEDICINE CLINIC | Age: 69
End: 2019-06-12

## 2019-06-12 VITALS
OXYGEN SATURATION: 98 % | WEIGHT: 158 LBS | BODY MASS INDEX: 22.12 KG/M2 | HEART RATE: 72 BPM | SYSTOLIC BLOOD PRESSURE: 122 MMHG | DIASTOLIC BLOOD PRESSURE: 85 MMHG | HEIGHT: 71 IN | TEMPERATURE: 97.7 F | RESPIRATION RATE: 17 BRPM

## 2019-06-12 DIAGNOSIS — Z00.00 ENCOUNTER FOR MEDICARE ANNUAL WELLNESS EXAM: Primary | ICD-10-CM

## 2019-06-12 DIAGNOSIS — E55.9 VITAMIN D DEFICIENCY: ICD-10-CM

## 2019-06-12 DIAGNOSIS — E78.5 DYSLIPIDEMIA: ICD-10-CM

## 2019-06-12 DIAGNOSIS — Z23 ENCOUNTER FOR IMMUNIZATION: ICD-10-CM

## 2019-06-12 NOTE — PROGRESS NOTES
ROOM # 2  Identified pt with two pt identifiers(name and ). Reviewed record in preparation for visit and have obtained necessary documentation. Chief Complaint   Patient presents with    Cholesterol Problem     4 month fu       Rivera Otoole preferred language for health care discussion is english/other. Is the patient using any DME equipment during OV? NO    Rivera Otoole is due for:  Health Maintenance Due   Topic    Shingrix Vaccine Age 50> (1 of 2)    Pneumococcal 65+ years (2 of 2 - PPSV23)   [place] [address] Maintenance reviewed and discussed per provider  Please order/place referral if appropriate. Advance Directive:  1. Do you have an advance directive in place? Patient Reply: NO    2. If not, would you like material regarding how to put one in place? NO    Coordination of Care:  1. Have you been to the ER, urgent care clinic since your last visit? Hospitalized since your last visit? NO    2. Have you seen or consulted any other health care providers outside of the [address] [place] [address] since your last visit? Include any pap smears or colon screening. NO    Patient is accompanied by self I have received verbal consent from Rivera Otoole to discuss any/all medical information while they are present in the room.     Learning Assessment:  Learning Assessment 3/9/2015 2014   PRIMARY LEARNER Patient Patient   HIGHEST LEVEL OF EDUCATION - PRIMARY LEARNER  > 4 YEARS OF COLLEGE -   PRIMARY LANGUAGE ENGLISH ENGLISH   LEARNER PREFERENCE PRIMARY DEMONSTRATION READING   ANSWERED BY Patient pt   RELATIONSHIP SELF SELF     Depression Screening:  3 most recent PHQ Screens 2017   Little interest or pleasure in doing things Not at all Not at all Not at all Not at all Not at all Not at all Not at all   Feeling down, depressed, irritable, or hopeless Not at all Not at all Not at all Not at all Not at all - Not at all Total Score PHQ 2 0 0 0 0 0 - 0     Abuse Screening:  Abuse Screening Questionnaire 3/19/2019 9/11/2017 5/9/2017 11/25/2015   Do you ever feel afraid of your partner? N N N N   Are you in a relationship with someone who physically or mentally threatens you? N N N N   Is it safe for you to go home? Hannah Steinberg     Fall Risk  Fall Risk Assessment, last 12 mths 2/12/2019 4/19/2018 12/19/2017 9/11/2017 5/9/2017 1/5/2017 9/6/2016   Able to walk? Yes Yes Yes Yes Yes Yes Yes   Fall in past 12 months? No No Yes Yes Yes No No   Fall with injury?  - - No No No - -   Number of falls in past 12 months - - 5 2 1 - -   Fall Risk Score - - 5 2 1 - -

## 2019-06-12 NOTE — ACP (ADVANCE CARE PLANNING)

## 2019-06-12 NOTE — PROGRESS NOTES
Chief Complaint   Patient presents with    Cholesterol Problem     4 month fu     Annual Wellness Visit       HPI:     Philipp Farmer is a 78 y.o.  female with history of dyslipidemia and hypertension  here for the above complaint. She denies any chest pain, shortness of breath, abdominal pain,headaches or dizziness. Past Medical History:   Diagnosis Date    Advance directive discussed with patient 09/06/2016    Atypical ductal hyperplasia of right breast 2018    Being followed by Dr. Yomi Neal.  Dyslipidemia     H/O colonoscopy 08/12/14    Done by Dr. Joanne Kim: diverticular dz & hemorrhoids, repeat in 10 years    Hyperlipemia     Hypertension     Microcalcifications of the breast 2013    left breast s/p Vacuum-assisted core needle biopsy of the left breast with placement of localizing clip  6/18/2013. Being followed by Dr. Edgard Desouza. Suppose to have mammoram in 6 months. Path showed firbrocystic changes    PAOLA (obstructive sleep apnea) 03/02/2017    on CPAP machine. Being followed by Dr. Simental Records    Sickle cell trait ([facility] [address]     Vitamin D deficiency      Past Surgical History:   Procedure Laterality Date    HX BREAST BIOPSY Bilateral 1994    bilateral biopsy-[facility]    HX BREAST LUMPECTOMY Right 10/2/2018    RIGHT BREAST NEEDLE LOC EXCISIONAL BIOPSY performed by Billy Chew MD at [address]    unsure of exact date 4080-7021, partial hysterectomy. kept both ovaries    HX TONSIL AND ADENOIDECTOMY  1962    [facility]    right [facility]    ANIBAL BIOPSY BREAST STEREOTACTIC Left 2013    benign     Current Outpatient Medications   Medication Sig    nystatin (MYCOSTATIN) topical cream Apply  to affected area two (2) times a day.  For maximum of 2 weeks    atorvastatin (LIPITOR) 10 mg tablet TAKE 1 TABLET BY MOUTH DAILY    cholecalciferol, vitamin D3, (VITAMIN D3) 2,000 unit tab Take 2,000 Int'l Units by mouth daily.  omega-3 fatty acids-vitamin e (FISH OIL) 1,000 mg cap Take 2 Caps by mouth daily.  iron 50 mg iron Tab Take 325 mg by mouth daily. Take one po daily    acetaminophen (TYLENOL ARTHRITIS PAIN) 650 mg CR tablet Take 650 mg by mouth every six (6) hours as needed. No current facility-administered medications for this visit. Health Maintenance   Topic Date Due    Shingrix Vaccine Age 49> (1 of 2) 03/10/1999    Pneumococcal 65+ years (2 of 2 - PPSV23) 06/10/2018    Influenza Age 5 to Adult  08/01/2019    GLAUCOMA SCREENING Q2Y  01/19/2020    MEDICARE YEARLY EXAM  06/12/2020    BREAST CANCER SCRN MAMMOGRAM  07/31/2020    COLONOSCOPY  08/12/2024    DTaP/Tdap/Td series (2 - Td) 03/12/2026    Hepatitis C Screening  Completed    Bone Densitometry (Dexa) Screening  Completed     Immunization History   Administered Date(s) Administered    Hep A Vaccine 04/13/2018    Influenza High Dose Vaccine PF 09/27/2015, 09/11/2017, 10/24/2018    Pneumococcal Conjugate (PCV-13) 04/25/2016    Pneumococcal Vaccine (Unspecified Type) 06/10/2013    Tdap 03/12/2016    Typhoid Vi Capsular Polysaccharide Vaccine 04/13/2018     No LMP recorded. Patient has had a hysterectomy. Allergies and Intolerances: Allergies   Allergen Reactions    Other Food Other (comments)     balsamic vinegar/vinegar: swelling in leg       Family History:   Family History   Problem Relation Age of Onset    Hypertension Mother     Diabetes Mother     Hypertension Father     Hypertension Maternal Grandmother     Hypertension Maternal Grandfather        Social History:   She  reports that she has never smoked. She has never used smokeless tobacco.  She  reports that she drinks alcohol.             ·     OBJECTIVE:   Physical exam:   Visit Vitals  /85 (BP 1 Location: Left arm, BP Patient Position: Sitting)   Pulse 72   Temp 97.7 °F (36.5 °C) (Oral)   Resp 17   Ht 5' 11\" (1.803 m)   Wt 158 lb (71.7 kg)   SpO2 98% BMI 22.04 kg/m²        Generally: Pleasant female in no acute distress  Cardiac Exam: regular, rate, and rhythm. Normal S1 and S2. No murmurs, gallops, or rubs  Pulmonary exam: Clear to auscultation bilaterally  Abdominal exam: Positive bowel sounds in all four quadrants, soft, nondistended, nontender  Extremities: 2+ dorsalis pedis pulses bilaterally. No pedal edema    bilaterally    LABS/RADIOLOGICAL TESTS:  Lab Results   Component Value Date/Time    WBC 4.0 (L) 02/12/2019 09:00 AM    HGB 12.9 02/12/2019 09:00 AM    HCT 38.2 02/12/2019 09:00 AM    PLATELET 101 64/07/7996 09:00 AM     Lab Results   Component Value Date/Time    Sodium 143 02/12/2019 09:00 AM    Potassium 3.5 02/12/2019 09:00 AM    Chloride 106 02/12/2019 09:00 AM    CO2 30 02/12/2019 09:00 AM    Glucose 103 (H) 02/12/2019 09:00 AM    BUN 12 02/12/2019 09:00 AM    Creatinine 0.50 (L) 02/12/2019 09:00 AM     Lab Results   Component Value Date/Time    Cholesterol, total 194 02/12/2019 09:00 AM    HDL Cholesterol 99 (H) 02/12/2019 09:00 AM    LDL, calculated 83.4 02/12/2019 09:00 AM    Triglyceride 58 02/12/2019 09:00 AM     No results found for: GPT  Previous labs  ASSESSMENT/PLAN:    1. Encounter for Medicare annual wellness exam    2. Dyslipidemia:we will see what the labs show. Continue diet, exercise and lipitor.   -     METABOLIC PANEL, COMPREHENSIVE; Future  -     LIPID PANEL; Future    3. Vitamin D deficiency: stable. Continue vitamin D.   -     VITAMIN D, 25 HYDROXY; Future    4. Encounter for immunization  -     ADMIN PNEUMOCOCCAL VACCINE  -     PNEUMOCOCCAL POLYSACCHARIDE VACCINE, 23-VALENT, ADULT OR IMMUNOSUPPRESSED PT DOSE,  -     ZOSTER VACC RECOMBINANT ADJUVANTED      5. Patient verbalized understanding and agreement with the plan. 6. Patient was given an after-visit summary. 7  Follow-up and Dispositions    · Return in about 3 months (around 9/12/2019) for f/u lipids or sooner if worsening symptoms.                 Semret ERROL Bruno. This is the Subsequent Medicare Annual Wellness Exam, performed 12 months or more after the Initial AWV or the last Subsequent AWV    I have reviewed the patient's medical history in detail and updated the computerized patient record. History     Past Medical History:   Diagnosis Date    Advance directive discussed with patient 09/06/2016    Atypical ductal hyperplasia of right breast 2018    Being followed by Dr. Aretha Nissen.  Dyslipidemia     H/O colonoscopy 08/12/14    Done by Dr. Patricia Pineda: diverticular dz & hemorrhoids, repeat in 10 years    Hyperlipemia     Hypertension     Microcalcifications of the breast 2013    left breast s/p Vacuum-assisted core needle biopsy of the left breast with placement of localizing clip  6/18/2013. Being followed by Dr. Zuleyma Storm. Suppose to have mammoram in 6 months. Path showed firbrocystic changes    PAOLA (obstructive sleep apnea) 03/02/2017    on CPAP machine. Being followed by Dr. Wandy Ellis    S[facility] ([facility] [address]     Vitamin D deficiency       Past Surgical History:   Procedure Laterality Date    HX BREAST BIOPSY Bilateral 1994    bilateral biopsy-[facility]    HX BREAST LUMPECTOMY Right 10/2/2018    RIGHT BREAST NEEDLE LOC EXCISIONAL BIOPSY performed by Nell Tucker MD at [address] [facility] [address] [place] [address]    unsure of exact date [id number], partial hysterectomy. kept both ovaries    HX TONSIL AND ADENOIDECTOMY  1962    KNEE SCOPE,MED OR LAT [facility]  2006    right knee    ANIBAL BIOPSY BREAST STEREOTACTIC Left 2013    benign     Current Outpatient Medications   Medication Sig Dispense Refill    nystatin (MYCOSTATIN) topical cream Apply  to affected area two (2) times a day. For maximum of 2 weeks 30 g 1    atorvastatin (LIPITOR) 10 mg tablet TAKE 1 TABLET BY MOUTH DAILY 90 Tab 3    cholecalciferol, vitamin D3, (VITAMIN D3) 2,000 unit tab Take 2,000 Int'l Units by mouth daily.       omega-3 fatty acids-vitamin e (FISH OIL) 1,000 mg cap Take 2 Caps by mouth daily.  iron 50 mg iron Tab Take 325 mg by mouth daily. Take one po daily      acetaminophen (TYLENOL ARTHRITIS PAIN) 650 mg CR tablet Take 650 mg by mouth every six (6) hours as needed. Allergies   Allergen Reactions    Other Food Other (comments)     balsamic vinegar/vinegar: swelling in leg     Family History   Problem Relation Age of Onset    Hypertension Mother     Diabetes Mother     Hypertension Father     Hypertension Maternal Grandmother     Hypertension Maternal Grandfather      Social History     Tobacco Use    Smoking status: Never Smoker    Smokeless tobacco: Never Used    Tobacco comment: pt counseled to continue to not smoke. Substance Use Topics    Alcohol use: Yes     Alcohol/week: 0.0 oz     Comment: occ     Patient Active Problem List   Diagnosis Code    Dyslipidemia E78.5    Sickle cell trait (HCC) D57.3    Microcalcifications of the breast R92.0    Vitamin D deficiency E55.9    Fibrocystic breast changes N60.19    H/O colonoscopy Z98.890    Advance directive discussed with patient Z70.80    PAOLA (obstructive sleep apnea) G47.33    Atypical ductal hyperplasia of right breast N60.91       Depression Risk Factor Screening:     3 most recent PHQ Screens 2/12/2019   Little interest or pleasure in doing things Not at all   Feeling down, depressed, irritable, or hopeless Not at all   Total Score PHQ 2 0     Alcohol Risk Factor Screening: You do not drink alcohol or very rarely. Functional Ability and Level of Safety:   Hearing Loss  Hearing is good. Activities of Daily Living  The home contains: no safety equipment. Patient does total self care    Fall Risk  Fall Risk Assessment, last 12 mths 2/12/2019   Able to walk? Yes   Fall in past 12 months?  No   Fall with injury? -   Number of falls in past 12 months -   Fall Risk Score -       Abuse Screen  Patient is not abused    Cognitive Screening   Evaluation of Cognitive Function:  Has your family/caregiver stated any concerns about your memory: no  Normal    Patient Care Team   Patient Care Team:  Adri Oliver MD as PCP - General (Internal Medicine)  Ria Cabrera., RN as Nurse Pawel Golden MD (General Surgery)  Willa Ventura MD as Physician (Ophthalmology)  Arnoldo Gardner MD (Breast Surgery)    Assessment/Plan   Education and counseling provided:  Are appropriate based on today's review and evaluation    Diagnoses and all orders for this visit:    ASSESSMENT/PLAN:    1. Encounter for Medicare annual wellness exam    2. Dyslipidemia:we will see what the labs show. Continue diet, exercise and lipitor.   -     METABOLIC PANEL, COMPREHENSIVE; Future  -     LIPID PANEL; Future    3. Vitamin D deficiency: stable. Continue vitamin D.   -     VITAMIN D, 25 HYDROXY; Future    4. Encounter for immunization  -     ADMIN PNEUMOCOCCAL VACCINE  -     PNEUMOCOCCAL POLYSACCHARIDE VACCINE, 23-VALENT, ADULT OR IMMUNOSUPPRESSED PT DOSE,  -     ZOSTER VACC RECOMBINANT ADJUVANTED      5. Patient verbalized understanding and agreement with the plan. 6. Patient was given an after-visit summary. 7  Follow-up and Dispositions    · Return in about 3 months (around 9/12/2019) for f/u lipids or sooner if worsening symptoms. Sukumar Porter M.D.        Health Maintenance Due   Topic Date Due    Shingrix Vaccine Age 49> (1 of 2) 03/10/1999    Pneumococcal 65+ years (2 of 2 - PPSV23) 06/10/2018

## 2019-06-12 NOTE — PATIENT INSTRUCTIONS
1) Follow-up in 3 months or sooner if worsening symptoms. Advance Care Planning: Care Instructions Your Care Instructions It can be hard to live with an illness that cannot be cured. But if your health is getting worse, you may want to make decisions about end-of-life care. Planning for the end of your life does not mean that you are giving up. It is a way to make sure that your wishes are met. Clearly stating your wishes can make it easier for your loved ones. Making plans while you are still able may also ease your mind and make your final days less stressful and more meaningful. Follow-up care is a key part of your treatment and safety. Be sure to make and go to all appointments, and call your doctor if you are having problems. It's also a good idea to know your test results and keep a list of the medicines you take. What can you do to plan for the end of life? · You can bring these issues up with your doctor. You do not need to wait until your doctor starts the conversation. You might start with \"I would not be willing to live with . Twin Reasoner Twin Reasoner Twin Reasoner \" When you complete this sentence it helps your doctor understand your wishes. · Talk openly and honestly with your doctor. This is the best way to understand the decisions you will need to make as your health changes. Know that you can always change your mind. · Ask your doctor about commonly used life-support measures. These include tube feedings, breathing machines, and fluids given through a vein (IV). Understanding these treatments will help you decide whether you want them. · You may choose to have these life-supporting treatments for a limited time. This allows a trial period to see whether they will help you. You may also decide that you want your doctor to take only certain measures to keep you alive. It is important to spell out these conditions so that your doctor and family understand them. · Talk to your doctor about how long you are likely to live. He or she may be able to give you an idea of what usually happens with your specific illness. · Think about preparing papers that state your wishes. This way there will not be any confusion about what you want. You can change your instructions at any time. Which papers should you prepare? Advance directives are legal papers that tell doctors how you want to be cared for at the end of your life. You do not need a  to write these papers. Ask your doctor or your [facility] department for information on how to write your advance directives. They may have the forms for each of these types of papers. Make sure your doctor has a copy of these on file, and give a copy to a family member or close friend. · Consider a do-not-resuscitate order (DNR). This order asks that no extra treatments be done if your heart stops or you stop breathing. Extra treatments may include cardiopulmonary resuscitation (CPR), electrical shock to restart your heart, or a machine to breathe for you. If you decide to have a DNR order, ask your doctor to explain and write it. Place the order in your home where everyone can easily see it. · Consider a living will. A living will explains your wishes about life support and other treatments at the end of your life if you become unable to speak for yourself. Living rubio tell doctors to use or not use treatments that would keep you alive. You must have one or two witnesses or a notary present when you sign this form. · Consider a durable power of  for health care. This allows you to name a person to make decisions about your care if you are not able to. Most people ask a close friend or family member. Talk to this person about the kinds of treatments you want and those that you do not want. Make sure this person understands your wishes. These legal papers are simple to change.  Tell your doctor what you want to change, and ask him or her to make a note in your medical file. Give your family updated copies of the papers. Where can you learn more? Go to http://sheila-esdras.info/. Enter P184 in the search box to learn more about \"Advance Care Planning: Care Instructions. \" Current as of: April 18, 2018 Content Version: 11.9 © 2573-0923 [employer]. Care instructions adapted under license by [employer] (which disclaims liability or warranty for this information). If you have questions about a medical condition or this instruction, always ask your healthcare professional. [address] any warranty or liability for your use of this information. Learning About Durable Power of  for Health Care What is a durable power of  for health care? A durable power of  for health care is one type of the legal forms called advance directives. It lets you decide who you want to make treatment decisions for you if you cannot speak or decide for yourself. The person you choose is called your health care agent. Another type of advance directive is a living will. It lets you write down what kinds of treatment or life support you want or do not want. What should you think about when choosing a health care agent? Choose your health care agent carefully. This person may or may not be a family member. Talk to the person before you make your final decision. Make sure he or she is comfortable with this responsibility. It's a good idea to choose someone who: · Is at least 25years old. · Knows you well and understands what makes life meaningful for you. · Understands your Yazdanism and moral values. · Will do what you want, not what he or she wants. · Will be able to make difficult choices at a stressful time. · Will be able to refuse or stop treatment, if that is what you would want, even if you could die. · Will be firm and confident with health professionals if needed. · Will ask questions to get necessary information. · Lives near you or agrees to travel to you if needed. Your family may help you make medical decisions while you can still be part of that process. But it is important to choose one person to be your health care agent in case you are not able to make decisions for yourself. If you don't fill out the legal form and name a health care agent, the decisions your family can make may be limited. Who will make decisions for you if you do not have a health care agent? If you don't have a health care agent or a living will, your family members may disagree about your medical care. And then some medical professionals who may not know you as well might have to make decisions for you. In some cases, a  makes the decisions. When you name a health care agent, it is very clear who has the power to make health decisions for you. How do you name a health care agent? You name your health care agent on a legal form. It is usually called a durable power of  for health care. Ask your hospital, state bar association, or office on aging where to find these forms. You must sign the form to make it legal. Some states require you to get the form notarized. This means that a person called a  watches you sign the form and then he or she signs the form. Some states also require that two or more witnesses sign the form. Be sure to tell your family members and doctors who your health care agent is. Keep your forms in a safe place. But make sure that your loved ones know where the forms are. This could be in your desk where you keep other important papers. Make sure your doctor has a copy of your forms. Where can you learn more? Go to http://sheila-esdras.info/. Enter [id number] in the search box to learn more about \"Learning About Durable Power of  for [facility]. \" 
 Current as of: April 18, 2018 Content Version: 11.9 © 5141-0291 [employer]. Care instructions adapted under license by [employer] (which disclaims liability or warranty for this information). If you have questions about a medical condition or this instruction, always ask your healthcare professional. [address] any warranty or liability for your use of this information. Deciding About Life-Prolonging Treatment How can you decide about life-prolonging treatment? What is life-prolonging treatment? There are many kinds of treatment that can help you live longer. These may be needed for only a short time until your illness improves. Or you may use them over the long term to help keep you alive. Some treatments include the use of: · Medicines to slow the progress of certain diseases, such as heart disease, diabetes, cancer, AIDS, or Alzheimer's disease. · Antibiotics to treat serious infections, such as pneumonia. · Dialysis to clean your blood if your kidneys stop working. · A breathing machine to help you breathe if you can't breathe on your own. This machine pumps air into your lungs through a tube put into your throat. · A feeding tube or an intravenous (IV) line to give you food and fluids if you can't eat or drink. · Cardiopulmonary resuscitation (CPR) to try to restart your heart. The decision to receive treatments that may help you live longer is a personal one. You may want your doctor to do everything possible to keep you alive, even when your chance for recovery is small. Or you may choose to only have care to manage your pain and other symptoms. What are key points about this decision? · If there is a good chance that your illness can be cured or managed, your doctor may advise you to first try available treatments. If these don't work, then you might think about stopping treatment. · If you stop treatment, you will still receive care that focuses on pain relief and comfort. · A decision to stop treatment that keeps you alive does not have to be permanent. You can always change your mind if your health starts to improve. · Even though treatment focuses on helping you live longer, it may cause side effects that can greatly affect your quality of life. And it could affect how you spend time with your family and friends. · If you still have personal goals that you want to pursue, you may want treatment that keeps you alive long enough to reach them. Why might you choose life-prolonging treatment? · There is a good chance that your illness can be cured or managed. · You think you can manage the possible side effects of treatment. · You don't think treatment will get in the way of your quality of life. · You have personal goals that you still want to pursue and achieve. Why might you choose to stop life-prolonging treatment? · Your chance of surviving your illness is very low. · You have tried all possible treatments for your illness, but they have not helped. · You can no longer deal with the side effects of treatment. · You have already met the goals you set out to achieve in your life. Your decision Thinking about the facts and your feelings can help you make a decision that is right for you. Be sure you understand the benefits and risks of your options, and think about what else you need to do before you make the decision. Where can you learn more? Go to http://sheila-esdras.info/. Enter Z712 in the search box to learn more about \"Deciding About Life-Prolonging Treatment. \" Current as of: April 18, 2018 Content Version: 11.9 © 5394-9518 [employer]. Care instructions adapted under license by [employer] (which disclaims liability or warranty for this information).  If you have questions about a medical condition or this instruction, always ask your healthcare professional. [place] [address] any warranty or liability for your use of this information. Jeanette Rothman 55[address] What is a living will? A living will is a legal form you use to write down the kind of care you want at the end of your life. It is used by the health professionals who will treat you if you aren't able to decide for yourself. If you put your wishes in writing, your loved ones and others will know what kind of care you want. They won't need to guess. This can ease your mind and be helpful to others. A living will is not the same as an estate or property will. An estate will explains what you want to happen with your money and property after you die. Is a living will a legal document? A living will is a legal document. Each state has its own laws about living rubio. If you move to another state, make sure that your living will is legal in the state where you now live. Or you might use a universal form that has been approved by many states. This kind of form can sometimes be completed and stored online. Your electronic copy will then be available wherever you have a connection to the Internet. In most cases, doctors will respect your wishes even if you have a form from a different state. · You don't need an  to complete a living will. But legal advice can be helpful if your state's laws are unclear, your health history is complicated, or your family can't agree on what should be in your living will. · You can change your living will at any time. Some people find that their wishes about end-of-life care change as their health changes. · In addition to making a living will, think about completing a medical power of  form.  This form lets you name the person you want to make end-of-life treatment decisions for you (your \"health care agent\") if you're not able to. Many hospitals and nursing homes will give you the forms you need to complete a living will and a medical power of . · Your living will is used only if you can't make or communicate decisions for yourself anymore. If you become able to make decisions again, you can accept or refuse any treatment, no matter what you wrote in your living will. · Your state may offer an online registry. This is a place where you can store your living will online so the doctors and nurses who need to treat you can find it right away. What should you think about when creating a living will? Talk about your end-of-life wishes with your family members and your doctor. Let them know what you want. That way the people making decisions for you won't be surprised by your choices. Think about these questions as you make your living will: · Do you know enough about life support methods that might be used? If not, talk to your doctor so you know what might be done if you can't breathe on your own, your heart stops, or you're unable to swallow. · What things would you still want to be able to do after you receive life-support methods? Would you want to be able to walk? To speak? To eat on your own? To live without the help of machines? · If you have a choice, where do you want to be cared for? In your home? At a hospital or nursing home? · Do you want certain Latter-day practices performed if you become very ill? · If you have a choice at the end of your life, where would you prefer to die? At home? In a hospital or nursing home? Somewhere else? · Would you prefer to be buried or cremated? · Do you want your organs to be donated after you die? What should you do with your living will? · Make sure that your family members and your health care agent have copies of your living will.  
· Give your doctor a copy of your living will to keep in your medical record. If you have more than one doctor, make sure that each one has a copy. · You may want to put a copy of your living will where it can be easily found. Where can you learn more? Go to http://sheila-esdras.info/. Enter O783 in the search box to learn more about \"Learning About Living Yeny. \" Current as of: April 18, 2018 Content Version: 11.9 © 7473-0090 [employer]mail. Care instructions adapted under license by [employer] (which disclaims liability or warranty for this information). If you have questions about a medical condition or this instruction, always ask your healthcare professional. [place] [address] any warranty or liability for your use of this information. Advance Directives: Care Instructions Your Care Instructions An advance directive is a legal way to state your wishes at the end of your life. It tells your family and your doctor what to do if you can no longer say what you want. There are two main types of advance directives. You can change them any time that your wishes change. · A living will tells your family and your doctor your wishes about life support and other treatment. · A durable power of  for health care lets you name a person to make treatment decisions for you when you can't speak for yourself. This person is called a health care agent. If you do not have an advance directive, decisions about your medical care may be made by a doctor or a  who doesn't know you. It may help to think of an advance directive as a gift to the people who care for you. If you have one, they won't have to make tough decisions by themselves. Follow-up care is a key part of your treatment and safety. Be sure to make and go to all appointments, and call your doctor if you are having problems. It's also a good idea to know your test results and keep a list of the medicines you take. How can you care for yourself at home? · Discuss your wishes with your loved ones and your doctor. This way, there are no surprises. · Many states have a unique form. Or you might use a universal form that has been approved by many states. This kind of form can sometimes be completed and stored online. Your electronic copy will then be available wherever you have a connection to the Internet. In most cases, doctors will respect your wishes even if you have a form from a different state. · You don't need a  to do an advance directive. But you may want to get legal advice. · Think about these questions when you prepare an advance directive: 
? Who do you want to make decisions about your medical care if you are not able to? Many people choose a family member or close friend. ? Do you know enough about life support methods that might be used? If not, talk to your doctor so you understand. ? What are you most afraid of that might happen? You might be afraid of having pain, losing your independence, or being kept alive by machines. ? Where would you prefer to die? Choices include your home, a hospital, or a nursing home. ? Would you like to have information about hospice care to support you and your family? ? Do you want to donate organs when you die? ? Do you want certain Sabianism practices performed before you die? If so, put your wishes in the advance directive. · Read your advance directive every year, and make changes as needed. When should you call for help? Be sure to contact your doctor if you have any questions. Where can you learn more? Go to http://sheila-esdras.info/. Enter R264 in the search box to learn more about \"Advance Directives: Care Instructions. \" Current as of: April 18, 2018 Content Version: 11.9 © 7893-9802 [employer].  Care instructions adapted under license by [employer] (which disclaims liability or warranty for this information). If you have questions about a medical condition or this instruction, always ask your healthcare professional. [address] any warranty or liability for your use of this information. Medicare Wellness Visit, Female The best way to live healthy is to have a lifestyle where you eat a well-balanced diet, exercise regularly, limit alcohol use, and quit all forms of tobacco/nicotine, if applicable. Regular preventive services are another way to keep healthy. Preventive services (vaccines, screening tests, monitoring & exams) can help personalize your care plan, which helps you manage your own care. Screening tests can find health problems at the earliest stages, when they are easiest to treat. [facility] follows the current, evidence-based guidelines published by the [facility] States Melvin Sajan ([facility]) when recommending preventive services for our patients. Because we follow these guidelines, sometimes recommendations change over time as research supports it. (For example, mammograms used to be recommended annually. Even though Medicare will still pay for an annual mammogram, the newer guidelines recommend a mammogram every two years for women of average risk.) Of course, you and your doctor may decide to screen more often for some diseases, based on your risk and your health status. Preventive services for you include: - Medicare offers their members a free annual wellness visit, which is time for you and your primary care provider to discuss and plan for your preventive service needs. Take advantage of this benefit every year! 
-All adults over the age of 72 should receive the recommended pneumonia vaccines.  Current USPSTF guidelines recommend a series of two vaccines for the best pneumonia protection.  
-All adults should have a flu vaccine yearly and a tetanus vaccine every 10 years. All adults age 61 and older should receive a shingles vaccine once in their lifetime.   
-A bone mass density test is recommended when a woman turns 65 to screen for osteoporosis. This test is only recommended one time, as a screening. Some providers will use this same test as a disease monitoring tool if you already have osteoporosis. -All adults age 38-68 who are overweight should have a diabetes screening test once every three years.  
-Other screening tests and preventive services for persons with diabetes include: an eye exam to screen for diabetic retinopathy, a kidney function test, a foot exam, and stricter control over your cholesterol.  
-Cardiovascular screening for adults with routine risk involves an electrocardiogram (ECG) at intervals determined by your doctor.  
-Colorectal cancer screenings should be done for adults age 54-65 with no increased risk factors for colorectal cancer. There are a number of acceptable methods of screening for this type of cancer. Each test has its own benefits and drawbacks. Discuss with your doctor what is most appropriate for you during your annual wellness visit. The different tests include: colonoscopy (considered the best screening method), a fecal occult blood test, a fecal DNA test, and sigmoidoscopy. -Breast cancer screenings are recommended every other year for women of normal risk, age 54-69. 
-Cervical cancer screenings for women over age 72 are only recommended with certain risk factors.  
-All adults born between [facility] should be screened once for Hepatitis C. Here is a list of your current Health Maintenance items (your personalized list of preventive services) with a due date: 
Health Maintenance Due Topic Date Due  Shingles Vaccine (1 of 2) 03/10/1999  Pneumococcal Vaccine (2 of 2 - PPSV23) 06/10/2018 [facility] Annual Well Visit  09/12/2018

## 2019-08-03 ENCOUNTER — OFFICE VISIT (OUTPATIENT)
Dept: INTERNAL MEDICINE CLINIC | Age: 69
End: 2019-08-03

## 2019-08-03 VITALS
RESPIRATION RATE: 16 BRPM | TEMPERATURE: 98.1 F | HEIGHT: 71 IN | OXYGEN SATURATION: 97 % | BODY MASS INDEX: 21.42 KG/M2 | WEIGHT: 153 LBS | DIASTOLIC BLOOD PRESSURE: 85 MMHG | SYSTOLIC BLOOD PRESSURE: 132 MMHG | HEART RATE: 73 BPM

## 2019-08-03 DIAGNOSIS — L25.9 CONTACT DERMATITIS, UNSPECIFIED CONTACT DERMATITIS TYPE, UNSPECIFIED TRIGGER: Primary | ICD-10-CM

## 2019-08-03 RX ORDER — TRIAMCINOLONE ACETONIDE 1 MG/G
CREAM TOPICAL 2 TIMES DAILY
Qty: 15 G | Refills: 0 | Status: SHIPPED | OUTPATIENT
Start: 2019-08-03 | End: 2019-09-12

## 2019-08-03 RX ORDER — RALOXIFENE HYDROCHLORIDE 60 MG/1
TABLET, FILM COATED ORAL
Refills: 2 | COMMUNITY
Start: 2019-07-28 | End: 2019-12-23

## 2019-08-03 NOTE — PROGRESS NOTES
ROOM # 2    Rachel Del Real presents today for   Chief Complaint   Patient presents with    Other     rash/sore on bottom        Rachel Del Real preferred language for health care discussion is english/other. Is someone accompanying this pt? No    Is the patient using any DME equipment during OV? No    Depression Screening:  3 most recent [facility] Screens 8/3/2019 2/12/2019 4/19/2018 12/19/2017 9/11/2017 5/9/2017 5/9/2017   Little interest or pleasure in doing things Not at all Not at all Not at all Not at all Not at all Not at all Not at all   Feeling down, depressed, irritable, or hopeless Not at all Not at all Not at all Not at all Not at all Not at all -   Total Score PHQ 2 0 0 0 0 0 0 -       Learning Assessment:  Learning Assessment 3/9/2015 1/28/2014   PRIMARY LEARNER Patient Patient   HIGHEST LEVEL OF EDUCATION - PRIMARY LEARNER  > 4 YEARS [address]   LEARNER PREFERENCE PRIMARY DEMONSTRATION READING   ANSWERED BY Patient pt   RELATIONSHIP SELF SELF       Abuse Screening:  Abuse Screening Questionnaire 3/19/2019 9/11/2017 5/9/2017 11/25/2015   Do you ever feel afraid of your partner? N N N N   Are you in a relationship with someone who physically or mentally threatens you? N N N N   Is it safe for you to go home? Karol Snyder       Fall Risk  Fall Risk Assessment, last 12 mths 8/3/2019 2/12/2019 4/19/2018 12/19/2017 9/11/2017 5/9/2017 1/5/2017   Able to walk? Yes Yes Yes Yes Yes Yes Yes   Fall in past 12 months? No No No Yes Yes Yes No   Fall with injury? - - - No No No -   Number of falls in past 12 months - - - 5 2 1 -   Fall Risk Score - - - 5 2 1 -       Health Maintenance reviewed and discussed per provider. Yes    Rachel Del Real is due for   Health Maintenance Due   Topic Date Due    Influenza Age 5 to Adult  08/01/2019     Please order/place referral if appropriate. Advance Directive:  1. Do you have an advance directive in place? Patient Reply: No    2.  If not, would you like material regarding how to put one in place? Patient Reply: No    Coordination of Care:  1. Have you been to the ER, urgent care clinic since your last visit? Hospitalized since your last visit? NO    2. Have you seen or consulted any other health care providers outside of the [address] [place] [address] since your last visit? Include any pap smears or colon screening.  No

## 2019-08-03 NOTE — PROGRESS NOTES
SUBJECTIVE:   HPI:  Marina Wynne is a 78 y.o. female who complains of rash on left buttocks x 4-5 days. Patient first noticed it being itchy earlier this week. She apparently sat on toilet (at someone else home) and thinks symptoms started after that. No pain. No blood. Does get rash under abdomen and uses nystatin - but has not had any problems in awhile. ROS:    · General: No fever, chills; no weight weight loss, no night sweats  · Respiratory: No cough, No shortness of breath, No wheezing  · Cardiovascular: No chest pain, No palpitations, No dyspnea on exertion  · Gastrointestinal: No nausea, no vomiting, no diarrhea, no constipation, no black or bloody stools  · Urinary: No dysuria, no urgency, no frequency, no blood, no discharge  · Musculoskeletal: no muscle weakness, no muscles pain  · Neurological: No dizziness, no headaches, no numbness/tingling of extremities    Past Medical History:   Diagnosis Date    Advance directive discussed with patient 09/06/2016    Atypical ductal hyperplasia of right breast 2018    Being followed by Dr. Lemuel Traylor.  Dyslipidemia     H/O colonoscopy 08/12/14    Done by Dr. Henson Perfect: diverticular dz & hemorrhoids, repeat in 10 years    Hyperlipemia     Hypertension     Microcalcifications of the breast 2013    left breast s/p Vacuum-assisted core needle biopsy of the left breast with placement of localizing clip  6/18/2013. Being followed by Dr. Adela Santillan. Suppose to have mammoram in 6 months. Path showed firbrocystic changes    PAOLA (obstructive sleep apnea) 03/02/2017    on CPAP machine.  Being followed by Dr. Priyanka English    Sick[facility] cell trait [facility])     Vitamin D deficiency      Past Surgical History:   Procedure Laterality Date    HX BREAST BIOPSY Bilateral 1994    bilateral biopsy-[facility]    HX BREAST LUMPECTOMY Right 10/2/2018    RIGHT BREAST NEEDLE LOC EXCISIONAL BIOPSY performed by Leo Menard MD at [address] [place] [address] 1994    unsure of exact date 4399-0754, partial hysterectomy. kept both ovaries    HX TONSIL AND ADENOIDECTOMY  1962    [facility]    right knee    ANIBAL BIOPSY BREAST STEREOTACTIC Left 2013    benign     Outpatient Medications Marked as Taking for the 8/3/19 encounter (Office Visit) with Nkechi Verdugo, DO   Medication Sig Dispense Refill    nystatin (MYCOSTATIN) topical cream Apply  to affected area two (2) times a day. For maximum of 2 weeks 30 g 1    atorvastatin (LIPITOR) 10 mg tablet TAKE 1 TABLET BY MOUTH DAILY 90 Tab 3    cholecalciferol, vitamin D3, (VITAMIN D3) 2,000 unit tab Take 2,000 Int'l Units by mouth daily.  omega-3 fatty acids-vitamin e (FISH OIL) 1,000 mg cap Take 2 Caps by mouth daily.  iron 50 mg iron Tab Take 325 mg by mouth daily. Take one po daily      acetaminophen (TYLENOL ARTHRITIS PAIN) 650 mg CR tablet Take 650 mg by mouth every six (6) hours as needed. Allergies   Allergen Reactions    Other Food Other (comments)     balsamic vinegar/vinegar: swelling in leg       OBJECTIVE:  Visit Vitals  /85 (BP 1 Location: Right arm, BP Patient Position: Sitting)   Pulse 73   Temp 98.1 °F (36.7 °C) (Oral)   Resp 16   Ht 5' 11\" (1.803 m)   Wt 153 lb (69.4 kg)   SpO2 97%   BMI 21.34 kg/m²      GEN: awake, alert, orientated, in no acute distress  EXTREMITIES: full range of motion, no weakness  LEFT BUTTOCKS: on center area, 2x2 slightly indurated, non tender erythematous patch with some concentrated clear vesicles in the center. (JACK Pierce was standby during exam)  NEURO: Cranial nerves II-XII grossly intact, normal balance, normal gait  PSYCH: Normal speech, normal mood, normal affect    ASSESSMENT/PLAN:     1. Contact dermatitis, unspecified contact dermatitis type, unspecified trigger - appears to be focal area of contact dermatitis (suspect this vs herpes simplex due to pruritis).   Will give empirical trial of topical corticosteroid for one week. Return if no improvement or sooner if worsening.    - triamcinolone acetonide (KENALOG) 0.1 % topical cream; Apply  to affected area two (2) times a day.  use thin layer  Dispense: 15 g; Refill: 0

## 2019-08-21 DIAGNOSIS — Z12.39 BREAST CANCER SCREENING: ICD-10-CM

## 2019-09-12 ENCOUNTER — HOSPITAL ENCOUNTER (OUTPATIENT)
Dept: LAB | Age: 69
Discharge: HOME | End: 2019-09-12
Payer: MEDICARE

## 2019-09-12 ENCOUNTER — OFFICE VISIT (OUTPATIENT)
Dept: INTERNAL MEDICINE CLINIC | Age: 69
End: 2019-09-12

## 2019-09-12 VITALS
HEIGHT: 71 IN | TEMPERATURE: 97.5 F | RESPIRATION RATE: 18 BRPM | SYSTOLIC BLOOD PRESSURE: 150 MMHG | HEART RATE: 68 BPM | OXYGEN SATURATION: 98 % | BODY MASS INDEX: 21.7 KG/M2 | WEIGHT: 155 LBS | DIASTOLIC BLOOD PRESSURE: 80 MMHG

## 2019-09-12 DIAGNOSIS — E78.5 DYSLIPIDEMIA: Primary | ICD-10-CM

## 2019-09-12 DIAGNOSIS — E78.5 DYSLIPIDEMIA: ICD-10-CM

## 2019-09-12 DIAGNOSIS — E55.9 VITAMIN D DEFICIENCY: ICD-10-CM

## 2019-09-12 DIAGNOSIS — I10 BENIGN HYPERTENSION WITHOUT CHF: ICD-10-CM

## 2019-09-12 DIAGNOSIS — Z23 NEED FOR SHINGLES VACCINE: ICD-10-CM

## 2019-09-12 LAB
ALBUMIN SERPL-MCNC: 4.1 G/DL (ref 3.4–5)
ALBUMIN/GLOB SERPL: 1.3 {RATIO} (ref 0.8–1.7)
ALP SERPL-CCNC: 113 U/L (ref 45–117)
ALT SERPL-CCNC: 41 U/L (ref 13–56)
ANION GAP SERPL CALC-SCNC: 8 MMOL/L (ref 3–18)
AST SERPL-CCNC: 31 U/L (ref 10–38)
BILIRUB SERPL-MCNC: 0.5 MG/DL (ref 0.2–1)
BUN SERPL-MCNC: 13 MG/DL (ref 7–18)
BUN/CREAT SERPL: 24 (ref 12–20)
CALCIUM SERPL-MCNC: 9.5 MG/DL (ref 8.5–10.1)
CHLORIDE SERPL-SCNC: 102 MMOL/L (ref 100–111)
CO2 SERPL-SCNC: 30 MMOL/L (ref 21–32)
CREAT SERPL-MCNC: 0.54 MG/DL (ref 0.6–1.3)
GLOBULIN SER CALC-MCNC: 3.1 G/DL (ref 2–4)
GLUCOSE SERPL-MCNC: 72 MG/DL (ref 74–99)
POTASSIUM SERPL-SCNC: 3.8 MMOL/L (ref 3.5–5.5)
PROT SERPL-MCNC: 7.2 G/DL (ref 6.4–8.2)
SODIUM SERPL-SCNC: 140 MMOL/L (ref 136–145)

## 2019-09-12 PROCEDURE — 80061 LIPID PANEL: CPT

## 2019-09-12 PROCEDURE — 80053 COMPREHEN METABOLIC PANEL: CPT

## 2019-09-12 PROCEDURE — 82306 VITAMIN D 25 HYDROXY: CPT

## 2019-09-12 PROCEDURE — 36415 COLL VENOUS BLD VENIPUNCTURE: CPT

## 2019-09-12 NOTE — PROGRESS NOTES
Rm:3    Chief Complaint   Patient presents with    Cholesterol Problem     Depression Screening:  3 most recent PHQ Screens 9/12/2019 8/3/2019 2/12/2019 4/19/2018 12/19/2017 9/11/2017 5/9/2017   Little interest or pleasure in doing things Not at all Not at all Not at all Not at all Not at all Not at all Not at all   Feeling down, depressed, irritable, or hopeless Not at all Not at all Not at all Not at all Not at all Not at all Not at all   Total Score PHQ 2 0 0 0 0 0 0 0       Learning Assessment:  Learning Assessment 3/9/2015 1/28/2014   PRIMARY LEARNER Patient Patient   HIGHEST LEVEL OF EDUCATION - PRIMARY LEARNER  > [facility] [address]   LEARNER PREFERENCE PRIMARY DEMONSTRATION READING   ANSWERED BY Patient pt   RELATIONSHIP SELF SELF       Abuse Screening:  Abuse Screening Questionnaire 3/19/2019 9/11/2017 5/9/2017 11/25/2015   Do you ever feel afraid of your partner? N N N N   Are you in a relationship with someone who physically or mentally threatens you? N N N N   Is it safe for you to go home? Indira Proctor       Health Maintenance reviewed and discussed per provider: yes     Coordination of Care:    1. Have you been to the ER, urgent care clinic since your last visit? Hospitalized since your last visit? no    2. Have you seen or consulted any other health care providers outside of the [address] [place] [address] since your last visit? Include any pap smears or colon screening.  no

## 2019-09-12 NOTE — PROGRESS NOTES
Chief Complaint   Patient presents with    Cholesterol Problem       HPI:     Derek Mcarthur is a 78 y.o.  female with history of dyslipidemia and hypertesnion  here for the above complaint. Rash is gone on the left buttock area. She denies any chest pain, shortness of breath, abdominal pain, headaches or dizziness. Past Medical History:   Diagnosis Date    Advance directive discussed with patient 09/06/2016    Atypical ductal hyperplasia of right breast 2018    Being followed by Dr. Farhat Ugarte.  Dyslipidemia     H/O colonoscopy 08/12/14    Done by Dr. Austin Cora: diverticular dz & hemorrhoids, repeat in 10 years    Hyperlipemia     Hypertension     Microcalcifications of the breast 2013    left breast s/p Vacuum-assisted core needle biopsy of the left breast with placement of localizing clip  6/18/2013. Being followed by Dr. Mila Ward. Suppose to have mammoram in 6 months. Path showed firbrocystic changes    PAOLA (obstructive sleep apnea) 03/02/2017    on CPAP machine. Being followed by Dr. Isabel Lesch    Sickle cell trait ([facility] [address]     Vitamin D deficiency      Past Surgical History:   Procedure Laterality Date    HX BREAST BIOPSY Bilateral 1994    [facility]    HX BREAST LUMPECTOMY Right 10/2/2018    RIGHT BREAST NEEDLE LOC EXCISIONAL BIOPSY performed by Vahid Guerrero MD at [facility] [address] [place] [address]    unsure of exact date [id number], partial hysterectomy. kept both ovaries    HX TONSIL AND ADENOIDECTOMY  1962    KNEE SCOPE,[facility]  2006    right knee    ANIBAL BIOPSY BREAST STEREOTACTIC Left 2013    benign     Current Outpatient Medications   Medication Sig    varicella-zoster recombinant, PF, (SHINGRIX, PF,) 50 mcg/0.5 mL susr injection 0.5 mL by IntraMUSCular route once for 1 dose.     raloxifene (EVISTA) 60 mg tablet     atorvastatin (LIPITOR) 10 mg tablet TAKE 1 TABLET BY MOUTH DAILY    cholecalciferol, vitamin D3, (VITAMIN D3) 2,000 unit tab Take 2,000 Int'l Units by mouth daily.  omega-3 fatty acids-vitamin e (FISH OIL) 1,000 mg cap Take 2 Caps by mouth daily.  iron 50 mg iron Tab Take 325 mg by mouth daily. Take one po daily    acetaminophen (TYLENOL ARTHRITIS PAIN) 650 mg CR tablet Take 650 mg by mouth every six (6) hours as needed.  nystatin (MYCOSTATIN) topical cream Apply  to affected area two (2) times a day. For maximum of 2 weeks     No current facility-administered medications for this visit. Health Maintenance   Topic Date Due    Shingrix Vaccine Age 49> (2 of 2) 08/07/2019    Influenza Age 5 to Adult  01/28/2021 (Originally 8/1/2019)    GLAUCOMA SCREENING Q2Y  01/19/2020    MEDICARE YEARLY EXAM  06/12/2020    BREAST CANCER SCRN MAMMOGRAM  08/06/2021    COLONOSCOPY  08/12/2024    DTaP/Tdap/Td series (2 - Td) 03/12/2026    Hepatitis C Screening  Completed    Bone Densitometry (Dexa) Screening  Completed    Pneumococcal 65+ years  Completed     Immunization History   Administered Date(s) Administered    Hep A Vaccine 04/13/2018    Hep A Vaccine (Adult) 04/13/2018    Influenza High Dose Vaccine PF 09/27/2015, 09/11/2017, 10/24/2018    Pneumococcal Conjugate (PCV-13) 04/25/2016    Pneumococcal Polysaccharide (PPSV-23) 06/12/2019    Pneumococcal Vaccine (Unspecified Type) 06/10/2013    Tdap 03/12/2016    Typhoid Vi Capsular Polysaccharide Vaccine 04/13/2018    Zoster Recombinant 06/12/2019     No LMP recorded. Patient has had a hysterectomy. Allergies and Intolerances: Allergies   Allergen Reactions    Other Food Other (comments)     balsamic vinegar/vinegar: swelling in leg       Family History:   Family History   Problem Relation Age of Onset    Hypertension Mother     Diabetes Mother     Hypertension Father     Hypertension Maternal Grandmother     Hypertension Maternal Grandfather        Social History:   She  reports that she has never smoked.  She has never used smokeless tobacco.  She  reports that she drinks alcohol. ·     OBJECTIVE:   Physical exam:   Visit Vitals  /80 (BP 1 Location: Left arm, BP Patient Position: Sitting)   Pulse 68   Temp 97.5 °F (36.4 °C) (Oral)   Resp 18   Ht 5' 11\" (1.803 m)   Wt 155 lb (70.3 kg)   SpO2 98%   BMI 21.62 kg/m²        Generally: Pleasant female in no acute distress  Cardiac Exam: regular, rate, and rhythm. Normal S1 and S2. No murmurs, gallops, or rubs  Pulmonary exam: Clear to auscultation bilaterally  Abdominal exam: Positive bowel sounds in all four quadrants, soft, nondistended, nontender  Extremities: 2+ dorsalis pedis pulses bilaterally. No pedal edema    bilaterally    LABS/RADIOLOGICAL TESTS:  Lab Results   Component Value Date/Time    WBC 4.0 (L) 02/12/2019 09:00 AM    HGB 12.9 02/12/2019 09:00 AM    HCT 38.2 02/12/2019 09:00 AM    PLATELET 050 72/36/1907 09:00 AM     Lab Results   Component Value Date/Time    Sodium 143 02/12/2019 09:00 AM    Potassium 3.5 02/12/2019 09:00 AM    Chloride 106 02/12/2019 09:00 AM    CO2 30 02/12/2019 09:00 AM    Glucose 103 (H) 02/12/2019 09:00 AM    BUN 12 02/12/2019 09:00 AM    Creatinine 0.50 (L) 02/12/2019 09:00 AM     Lab Results   Component Value Date/Time    Cholesterol, total 194 02/12/2019 09:00 AM    HDL Cholesterol 99 (H) 02/12/2019 09:00 AM    LDL, calculated 83.4 02/12/2019 09:00 AM    Triglyceride 58 02/12/2019 09:00 AM     No results found for: GPT  Previous labs    ASSESSMENT/PLAN:    1. Dyslipidemia:we will see what the labs show. Continue diet, exercise and lipitor.   -     METABOLIC PANEL, COMPREHENSIVE; Future  -     LIPID PANEL; Future    2. Benign hypertension without CHF: not well controlled due to pt concerned about her rash. She will monitor BP and let us know if too high or too low. Work on diet and exercise. 3. Vitamin D deficiency  -     VITAMIN D, 25 HYDROXY; Future    4.  Need for shingles vaccine  -     varicella-zoster recombinant, PF, ([address], PF,) 50 mcg/0.5 mL susr injection; 0.5 mL by IntraMUSCular route once for 1 dose. 5.   Requested Prescriptions     Signed Prescriptions Disp Refills    varicella-zoster recombinant, PF, (SHINGRIX, PF,) 50 mcg/0.5 mL susr injection 0.5 mL 0     Si.5 mL by IntraMUSCular route once for 1 dose. 6. Patient verbalized understanding and agreement with the plan. 7. Patient was given an after-visit summary. 8.   Follow-up and Dispositions    Return in about 5 months (around 2020) for f/u lipids  or sooner if worsening symptoms.     ·            Marquis Demario M.D.

## 2019-09-13 LAB
25(OH)D3 SERPL-MCNC: 31.5 NG/ML (ref 30–100)
CHOLEST SERPL-MCNC: 226 MG/DL
HDLC SERPL-MCNC: 109 MG/DL (ref 40–60)
HDLC SERPL: 2.1 {RATIO} (ref 0–5)
LDLC SERPL CALC-MCNC: 104.2 MG/DL (ref 0–100)
LIPID PROFILE,FLP: ABNORMAL
TRIGL SERPL-MCNC: 64 MG/DL (ref ?–150)
VLDLC SERPL CALC-MCNC: 12.8 MG/DL

## 2019-12-23 ENCOUNTER — OFFICE VISIT (OUTPATIENT)
Dept: INTERNAL MEDICINE CLINIC | Age: 69
End: 2019-12-23

## 2019-12-23 ENCOUNTER — HOSPITAL ENCOUNTER (OUTPATIENT)
Dept: LAB | Age: 69
Discharge: HOME | End: 2019-12-23
Payer: MEDICARE

## 2019-12-23 VITALS
HEART RATE: 76 BPM | OXYGEN SATURATION: 100 % | RESPIRATION RATE: 17 BRPM | WEIGHT: 152 LBS | HEIGHT: 71 IN | SYSTOLIC BLOOD PRESSURE: 133 MMHG | BODY MASS INDEX: 21.28 KG/M2 | DIASTOLIC BLOOD PRESSURE: 92 MMHG | TEMPERATURE: 97.1 F

## 2019-12-23 DIAGNOSIS — Z13.29 THYROID DISORDER SCREENING: ICD-10-CM

## 2019-12-23 DIAGNOSIS — E55.9 VITAMIN D DEFICIENCY: ICD-10-CM

## 2019-12-23 DIAGNOSIS — I10 BENIGN HYPERTENSION WITHOUT CHF: Primary | ICD-10-CM

## 2019-12-23 DIAGNOSIS — E78.5 DYSLIPIDEMIA: ICD-10-CM

## 2019-12-23 DIAGNOSIS — I10 BENIGN HYPERTENSION WITHOUT CHF: ICD-10-CM

## 2019-12-23 LAB
25(OH)D3 SERPL-MCNC: 56.1 NG/ML (ref 30–100)
ALBUMIN SERPL-MCNC: 3.8 G/DL (ref 3.4–5)
ALBUMIN/GLOB SERPL: 1.2 {RATIO} (ref 0.8–1.7)
ALP SERPL-CCNC: 104 U/L (ref 45–117)
ALT SERPL-CCNC: 28 U/L (ref 13–56)
ANION GAP SERPL CALC-SCNC: 5 MMOL/L (ref 3–18)
AST SERPL-CCNC: 20 U/L (ref 10–38)
BILIRUB SERPL-MCNC: 0.5 MG/DL (ref 0.2–1)
BUN SERPL-MCNC: 18 MG/DL (ref 7–18)
BUN/CREAT SERPL: 33 (ref 12–20)
CALCIUM SERPL-MCNC: 9.3 MG/DL (ref 8.5–10.1)
CHLORIDE SERPL-SCNC: 108 MMOL/L (ref 100–111)
CHOLEST SERPL-MCNC: 237 MG/DL
CO2 SERPL-SCNC: 30 MMOL/L (ref 21–32)
CREAT SERPL-MCNC: 0.54 MG/DL (ref 0.6–1.3)
ERYTHROCYTE [DISTWIDTH] IN BLOOD BY AUTOMATED COUNT: 14.5 % (ref 11.6–14.5)
GLOBULIN SER CALC-MCNC: 3.2 G/DL (ref 2–4)
GLUCOSE SERPL-MCNC: 83 MG/DL (ref 74–99)
HCT VFR BLD AUTO: 36 % (ref 35–45)
HDLC SERPL-MCNC: 117 MG/DL (ref 40–60)
HDLC SERPL: 2 {RATIO} (ref 0–5)
HGB BLD-MCNC: 12.3 G/DL (ref 12–16)
LDLC SERPL CALC-MCNC: 110.4 MG/DL (ref 0–100)
LIPID PROFILE,FLP: ABNORMAL
MCH RBC QN AUTO: 29.1 PG (ref 24–34)
MCHC RBC AUTO-ENTMCNC: 34.2 G/DL (ref 31–37)
MCV RBC AUTO: 85.3 FL (ref 74–97)
PLATELET # BLD AUTO: 353 K/UL (ref 135–420)
PMV BLD AUTO: 10.1 FL (ref 9.2–11.8)
POTASSIUM SERPL-SCNC: 4.1 MMOL/L (ref 3.5–5.5)
PROT SERPL-MCNC: 7 G/DL (ref 6.4–8.2)
RBC # BLD AUTO: 4.22 M/UL (ref 4.2–5.3)
SODIUM SERPL-SCNC: 143 MMOL/L (ref 136–145)
TRIGL SERPL-MCNC: 48 MG/DL (ref ?–150)
TSH SERPL DL<=0.05 MIU/L-ACNC: 1.26 UIU/ML (ref 0.36–3.74)
VLDLC SERPL CALC-MCNC: 9.6 MG/DL
WBC # BLD AUTO: 5.3 K/UL (ref 4.6–13.2)

## 2019-12-23 PROCEDURE — 80053 COMPREHEN METABOLIC PANEL: CPT

## 2019-12-23 PROCEDURE — 80061 LIPID PANEL: CPT

## 2019-12-23 PROCEDURE — 84443 ASSAY THYROID STIM HORMONE: CPT

## 2019-12-23 PROCEDURE — 36415 COLL VENOUS BLD VENIPUNCTURE: CPT

## 2019-12-23 PROCEDURE — 82306 VITAMIN D 25 HYDROXY: CPT

## 2019-12-23 PROCEDURE — 85027 COMPLETE CBC AUTOMATED: CPT

## 2019-12-23 RX ORDER — ATORVASTATIN CALCIUM 10 MG/1
TABLET, FILM COATED ORAL
Qty: 90 TAB | Refills: 0 | Status: SHIPPED | OUTPATIENT
Start: 2019-12-23 | End: 2020-03-31

## 2019-12-23 NOTE — PROGRESS NOTES
ROOM # 1  Identified pt with two pt identifiers(name and ). Reviewed record in preparation for visit and have obtained necessary documentation. Chief Complaint   Patient presents with    Cholesterol Problem     3 month fu       Zackery Torres preferred language for health care discussion is english/other. Is the patient using any DME equipment during OV? NO    Zackery Torres is due for:  Health Maintenance Due   Topic    Shingrix Vaccine Age 50> (2 of 2)   [address] Maintenance reviewed and discussed per provider  Please order/place referral if appropriate. Advance Directive:  1. Do you have an advance directive in place? Patient Reply: NO    2. If not, would you like material regarding how to put one in place? NO    Coordination of Care:  1. Have you been to the ER, urgent care clinic since your last visit? Hospitalized since your last visit? NO    2. Have you seen or consulted any other health care providers outside of the [address] [place] [address] since your last visit? Include any pap smears or colon screening. NO    Patient is accompanied by self I have received verbal consent from Zackery Torres to discuss any/all medical information while they are present in the room.     Learning Assessment:  Learning Assessment 3/9/2015 2014   PRIMARY LEARNER Patient Patient   HIGHEST LEVEL OF EDUCATION - PRIMARY LEARNER  > 4 YEARS OF COLLEGE -   PRIMARY LANGUAGE ENGLISH ENGLISH   LEARNER PREFERENCE PRIMARY DEMONSTRATION READING   ANSWERED BY Patient pt   RELATIONSHIP SELF SELF     Depression Screening:  3 most recent PHQ Screens 2019 8/3/2019 2019 2018 2017 2017 2017   Little interest or pleasure in doing things Not at all Not at all Not at all Not at all Not at all Not at all Not at all   Feeling down, depressed, irritable, or hopeless Not at all Not at all Not at all Not at all Not at all Not at all Not at all   Total Score PHQ 2 0 0 0 0 0 0 0 Abuse Screening:  Abuse Screening Questionnaire 3/19/2019 9/11/2017 5/9/2017 11/25/2015   Do you ever feel afraid of your partner? N N N N   Are you in a relationship with someone who physically or mentally threatens you? N N N N   Is it safe for you to go home? Osvaldo Horta     Fall Risk  Fall Risk Assessment, last 12 mths 9/12/2019 8/3/2019 2/12/2019 4/19/2018 12/19/2017 9/11/2017 5/9/2017   Able to walk? Yes Yes Yes Yes Yes Yes Yes   Fall in past 12 months?  No No No No Yes Yes Yes   Fall with injury? - - - - No No No   Number of falls in past 12 months - - - - 5 2 1   Fall Risk Score - - - - 5 2 1

## 2019-12-23 NOTE — PROGRESS NOTES
Chief Complaint   Patient presents with    Cholesterol Problem     3 month fu        HPI:     Lyndon Jones is a 78 y.o.  female with history of hyperlipidemia and hypertension  here for the above complaint. She denies any  Chest pain, shortness of breath, abdominal pain, headaches or dizziness. She needs refill of her lipitor. She will get a new PCP as I am leaving the practice. She thinks she got the 1st dose of shingles vaccine at [facility]. She will check with them and have them fax us documentation of this. Past Medical History:   Diagnosis Date    Advance directive discussed with patient 09/06/2016    Atypical ductal hyperplasia of right breast 2018    Being followed by Dr. Orin King.  Dyslipidemia     H/O colonoscopy 08/12/14    Done by Dr. Sisi Rubin: diverticular dz & hemorrhoids, repeat in 10 years    Hyperlipemia     Hypertension     Microcalcifications of the breast 2013    left breast s/p Vacuum-assisted core needle biopsy of the left breast with placement of localizing clip  6/18/2013. Being followed by Dr. Sheyla Meza. Suppose to have mammoram in 6 months. Path showed firbrocystic changes    PAOLA (obstructive sleep apnea) 03/02/2017    on CPAP machine. Being followed by Dr. Isaias Lopez    Sickle cell trait ([facility] [address]     Vitamin D deficiency      Past Surgical History:   Procedure Laterality Date    HX BREAST BIOPSY Bilateral 1994    bilateral biopsy-[facility]    HX BREAST LUMPECTOMY Right 10/2/2018    RIGHT BREAST NEEDLE LOC EXCISIONAL BIOPSY performed by Rolf Kiser MD [facility] [address] [facility] [facility] [address] [place] [address]    unsure of exact date [id number], partial hysterectomy.  kept both ovaries    HX TONSIL AND ADENOIDECTOMY  1962    KNEE SCOPE,MED OR LAT [facility] [place]  2006    right knee    ANIBAL BIOPSY BREAST STEREOTACTIC Left 2013    benign     Current Outpatient Medications   Medication Sig    atorvastatin (LIPITOR) 10 mg tablet TAKE 1 TABLET BY MOUTH DAILY  cholecalciferol, vitamin D3, (VITAMIN D3) 2,000 unit tab Take 4,000 Int'l Units by mouth daily.  omega-3 fatty acids-vitamin e (FISH OIL) 1,000 mg cap Take 2 Caps by mouth daily.  iron 50 mg iron Tab Take 325 mg by mouth daily. Take one po daily    acetaminophen (TYLENOL ARTHRITIS PAIN) 650 mg CR tablet Take 650 mg by mouth every six (6) hours as needed. No current facility-administered medications for this visit. Health Maintenance   Topic Date Due    Shingrix Vaccine Age 49> (2 of 2) 08/07/2019    GLAUCOMA SCREENING Q2Y  01/19/2020    Influenza Age 5 to Adult  01/28/2021 (Originally 8/1/2019)    MEDICARE YEARLY EXAM  06/12/2020    BREAST CANCER SCRN MAMMOGRAM  08/06/2021    COLONOSCOPY  08/12/2024    DTaP/Tdap/Td series (2 - Td) 03/12/2026    Hepatitis C Screening  Completed    Bone Densitometry (Dexa) Screening  Completed    Pneumococcal 65+ years  Completed     Immunization History   Administered Date(s) Administered    Hep A Vaccine 04/13/2018    Hep A Vaccine (Adult) 04/13/2018    Influenza High Dose Vaccine PF 09/27/2015, 09/11/2017, 10/24/2018    Pneumococcal Conjugate (PCV-13) 04/25/2016    Pneumococcal Polysaccharide (PPSV-23) 06/12/2019    Pneumococcal Vaccine (Unspecified Type) 06/10/2013    Tdap 03/12/2016    Typhoid Vi Capsular Polysaccharide Vaccine 04/13/2018    Zoster Recombinant 06/12/2019     No LMP recorded. Patient has had a hysterectomy. Allergies and Intolerances: Allergies   Allergen Reactions    Other Food Other (comments)     balsamic vinegar/vinegar: swelling in leg    Evista [Raloxifene] Other (comments)     Per pt stopped due to rash. Family History:   Family History   Problem Relation Age of Onset    Hypertension Mother     Diabetes Mother     Hypertension Father     Hypertension Maternal Grandmother     Hypertension Maternal Grandfather        Social History:   She  reports that she has never smoked.  She has never used smokeless tobacco.  She  reports current alcohol use. ·     OBJECTIVE:   Physical exam:   Visit Vitals  BP (!) 133/92 (BP 1 Location: Right arm, BP Patient Position: Sitting)   Pulse 76   Temp 97.1 °F (36.2 °C) (Oral)   Resp 17   Ht 5' 11\" (1.803 m)   Wt 152 lb (68.9 kg)   SpO2 100%   BMI 21.20 kg/m²        Generally: Pleasant female in no acute distress  Cardiac Exam: regular, rate, and rhythm. Normal S1 and S2. No murmurs, gallops, or rubs  Pulmonary exam: Clear to auscultation bilaterally  Abdominal exam: Positive bowel sounds in all four quadrants, soft, nondistended, nontender  Extremities: 2+ dorsalis pedis pulses bilaterally. No pedal edema    bilaterally    LABS/RADIOLOGICAL TESTS:  Component      Latest Ref Rng & Units 9/12/2019 9/12/2019 9/12/2019 2/12/2019           9:39 AM  9:39 AM  9:39 AM  9:00 AM   Sodium      136 - 145 mmol/L   140    Potassium      3.5 - 5.5 mmol/L   3.8    Chloride      100 - 111 mmol/L   102    CO2      21 - 32 mmol/L   30    Anion gap      3.0 - 18 mmol/L   8    Glucose      74 - 99 mg/dL   72 (L)    BUN      7.0 - 18 MG/DL   13    Creatinine      0.6 - 1.3 MG/DL   0.54 (L)    BUN/Creatinine ratio      12 - 20     24 (H)    GFR est AA      >60 ml/min/1.73m2   >60    GFR est non-AA      >60 ml/min/1.73m2   >60    Calcium      8.5 - 10.1 MG/DL   9.5    Bilirubin, total      0.2 - 1.0 MG/DL   0.5    ALT (SGPT)      13 - 56 U/L   41    AST      10 - 38 U/L   31    Alk.  phosphatase      45 - 117 U/L   113    Protein, total      6.4 - 8.2 g/dL   7.2    Albumin      3.4 - 5.0 g/dL   4.1    Globulin      2.0 - 4.0 g/dL   3.1    A-G Ratio      0.8 - 1.7     1.3    WBC      4.6 - 13.2 K/uL       RBC      4.20 - 5.30 M/uL       HGB      12.0 - 16.0 g/dL       HCT      35.0 - 45.0 %       MCV      74.0 - 97.0 FL       MCH      24.0 - 34.0 PG       MCHC      31.0 - 37.0 g/dL       RDW      11.6 - 14.5 %       PLATELET      436 - 833 K/uL       MPV      9.2 - 11.8 FL Cholesterol, total      <200 MG/DL  226 (H)  194   Triglyceride      <150 MG/DL  64  58   HDL Cholesterol      40 - 60 MG/DL  109 (H)  99 (H)   LDL, calculated      0 - 100 MG/DL  104.2 (H)  83.4   VLDL, calculated      MG/DL  12.8  11.6   CHOL/HDL Ratio      0 - 5.0    2.1  2.0   Vitamin D 25-Hydroxy      30 - 100 ng/mL 31.5        Component      Latest Ref Rng & Units 2/12/2019 2/12/2019           9:00 AM  9:00 AM   Sodium      136 - 145 mmol/L 143    Potassium      3.5 - 5.5 mmol/L 3.5    Chloride      100 - 111 mmol/L 106    CO2      21 - 32 mmol/L 30    Anion gap      3.0 - 18 mmol/L 7    Glucose      74 - 99 mg/dL 103 (H)    BUN      7.0 - 18 MG/DL 12    Creatinine      0.6 - 1.3 MG/DL 0.50 (L)    BUN/Creatinine ratio      12 - 20   24 (H)    GFR est AA      >60 ml/min/1.73m2 >60    GFR est non-AA      >60 ml/min/1.73m2 >60    Calcium      8.5 - 10.1 MG/DL 9.4    Bilirubin, total      0.2 - 1.0 MG/DL 0.5    ALT (SGPT)      13 - 56 U/L 23    AST      10 - 38 U/L 18    Alk. phosphatase      45 - 117 U/L 99    Protein, total      6.4 - 8.2 g/dL 6.9    Albumin      3.4 - 5.0 g/dL 3.8    Globulin      2.0 - 4.0 g/dL 3.1    A-G Ratio      0.8 - 1.7   1.2    WBC      4.6 - 13.2 K/uL  4.0 (L)   RBC      4.20 - 5.30 M/uL  4.34   HGB      12.0 - 16.0 g/dL  12.9   HCT      35.0 - 45.0 %  38.2   MCV      74.0 - 97.0 FL  88.0   MCH      24.0 - 34.0 PG  29.7   MCHC      31.0 - 37.0 g/dL  33.8   RDW      11.6 - 14.5 %  14.0   PLATELET      264 - 097 K/uL  332   MPV      9.2 - 11.8 FL  10.2   Cholesterol, total      <200 MG/DL     Triglyceride      <150 MG/DL     HDL Cholesterol      40 - 60 MG/DL     LDL, calculated      0 - 100 MG/DL     VLDL, calculated      MG/DL     CHOL/HDL Ratio      0 - 5.0       Vitamin D 25-Hydroxy      30 - 100 ng/mL       Previous labs    ASSESSMENT/PLAN:    1. Benign hypertension without CHF: somewhat stable. Not on medication. Continue diet and exercise. -     CBC W/O DIFF; Future    2. Dyslipidemia:we will see what the labs show. Continue diet, exercise and lipitor.   -     atorvastatin (LIPITOR) 10 mg tablet; TAKE 1 TABLET BY MOUTH DAILY  -     METABOLIC PANEL, COMPREHENSIVE; Future  -     LIPID PANEL; Future    3. Vitamin D deficiency:stable. Continue the vitamin D.   -     VITAMIN D, 25 HYDROXY; Future    4. Thyroid disorder screening  -     TSH 3RD GENERATION; Future      5. Requested Prescriptions     Signed Prescriptions Disp Refills    atorvastatin (LIPITOR) 10 mg tablet 90 Tab 0     Sig: TAKE 1 TABLET BY MOUTH DAILY     6. Patient verbalized understanding and agreement with the plan. 7. Patient was given an after-visit summary. 8.   Follow-up and Dispositions    · Return in about 3 months (around 3/23/2020) for f/u lipids with new PCP  or sooner if worsening symptoms.                Livia Blair M.D.

## 2020-01-03 ENCOUNTER — TELEPHONE (OUTPATIENT)
Dept: INTERNAL MEDICINE CLINIC | Age: 70
End: 2020-01-03

## 2020-01-06 NOTE — TELEPHONE ENCOUNTER
Spoke with patient 2 identifiers was confirmed. Patient just wanted to know the date she got the injection. Patient was given that information, patient had no other questions.

## 2020-02-25 ENCOUNTER — OFFICE VISIT (OUTPATIENT)
Dept: FAMILY MEDICINE CLINIC | Age: 70
End: 2020-02-25

## 2020-02-25 VITALS
OXYGEN SATURATION: 98 % | SYSTOLIC BLOOD PRESSURE: 138 MMHG | BODY MASS INDEX: 22.54 KG/M2 | WEIGHT: 161 LBS | HEART RATE: 74 BPM | TEMPERATURE: 98.2 F | HEIGHT: 71 IN | DIASTOLIC BLOOD PRESSURE: 81 MMHG | RESPIRATION RATE: 17 BRPM

## 2020-02-25 DIAGNOSIS — G47.33 OSA (OBSTRUCTIVE SLEEP APNEA): ICD-10-CM

## 2020-02-25 DIAGNOSIS — I10 ESSENTIAL HYPERTENSION: Primary | ICD-10-CM

## 2020-02-25 DIAGNOSIS — R01.1 MURMUR, CARDIAC: ICD-10-CM

## 2020-02-25 DIAGNOSIS — N60.91 ATYPICAL DUCTAL HYPERPLASIA OF RIGHT BREAST: ICD-10-CM

## 2020-02-25 RX ORDER — RALOXIFENE HYDROCHLORIDE 60 MG/1
60 TABLET, FILM COATED ORAL DAILY
COMMUNITY
Start: 2020-02-17

## 2020-02-25 NOTE — PATIENT INSTRUCTIONS
We will fax the echocardiogram order to [facility] today. They should call you today or tomorrow to schedule this. If they do not call you by Thursday give them a call at [phone] to set this up. DASH Diet: Care Instructions Your Care Instructions The DASH diet is an eating plan that can help lower your blood pressure. DASH stands for Dietary Approaches to Stop Hypertension. Hypertension is high blood pressure. The DASH diet focuses on eating foods that are high in calcium, potassium, and magnesium. These nutrients can lower blood pressure. The foods that are highest in these nutrients are fruits, vegetables, low-fat dairy products, nuts, seeds, and legumes. But taking calcium, potassium, and magnesium supplements instead of eating foods that are high in those nutrients does not have the same effect. The DASH diet also includes whole grains, fish, and poultry. The DASH diet is one of several lifestyle changes your doctor may recommend to lower your high blood pressure. Your doctor may also want you to decrease the amount of sodium in your diet. Lowering sodium while following the DASH diet can lower blood pressure even further than just the DASH diet alone. Follow-up care is a key part of your treatment and safety. Be sure to make and go to all appointments, and call your doctor if you are having problems. It's also a good idea to know your test results and keep a list of the medicines you take. How can you care for yourself at home? Following the DASH diet · Eat 4 to 5 servings of fruit each day. A serving is 1 medium-sized piece of fruit, ½ cup chopped or canned fruit, 1/4 cup dried fruit, or 4 ounces (½ cup) of fruit juice. Choose fruit more often than fruit juice. · Eat 4 to 5 servings of vegetables each day. A serving is 1 cup of lettuce or raw leafy vegetables, ½ cup of chopped or cooked vegetables, or 4 ounces (½ cup) of vegetable juice.  Choose vegetables more often than vegetable juice. · Get 2 to 3 servings of low-fat and fat-free dairy each day. A serving is 8 ounces of milk, 1 cup of yogurt, or 1 ½ ounces of cheese. · Eat 6 to 8 servings of grains each day. A serving is 1 slice of bread, 1 ounce of dry cereal, or ½ cup of cooked rice, pasta, or cooked cereal. Try to choose whole-grain products as much as possible. · Limit lean meat, poultry, and fish to 2 servings each day. A serving is 3 ounces, about the size of a deck of cards. · Eat 4 to 5 servings of nuts, seeds, and legumes (cooked dried beans, lentils, and split peas) each week. A serving is 1/3 cup of nuts, 2 tablespoons of seeds, or ½ cup of cooked beans or peas. · Limit fats and oils to 2 to 3 servings each day. A serving is 1 teaspoon of vegetable oil or 2 tablespoons of salad dressing. · Limit sweets and added sugars to 5 servings or less a week. A serving is 1 tablespoon jelly or jam, ½ cup sorbet, or 1 cup of lemonade. · Eat less than 2,300 milligrams (mg) of sodium a day. If you limit your sodium to 1,500 mg a day, you can lower your blood pressure even more. Tips for success · Start small. Do not try to make dramatic changes to your diet all at once. You might feel that you are missing out on your favorite foods and then be more likely to not follow the plan. Make small changes, and stick with them. Once those changes become habit, add a few more changes. · Try some of the following: ? Make it a goal to eat a fruit or vegetable at every meal and at snacks. This will make it easy to get the recommended amount of fruits and vegetables each day. ? Try yogurt topped with fruit and nuts for a snack or healthy dessert. ? Add lettuce, tomato, cucumber, and onion to sandwiches. ? Combine a ready-made pizza crust with low-fat mozzarella cheese and lots of vegetable toppings. Try using tomatoes, squash, spinach, broccoli, carrots, cauliflower, and onions. ? Have a variety of cut-up vegetables with a low-fat dip as an appetizer instead of chips and dip. ? Sprinkle sunflower seeds or chopped almonds over salads. Or try adding chopped walnuts or almonds to cooked vegetables. ? Try some vegetarian meals using beans and peas. Add garbanzo or kidney beans to salads. Make burritos and tacos with mashed henderson beans or black beans. Where can you learn more? Go to http://sheila-esdras.info/. Enter C169 in the search box to learn more about \"DASH Diet: Care Instructions. \" Current as of: April 9, 2019 Content Version: 12.2 © 5476-8070 [employer]. Care instructions adapted under license by [employer] (which disclaims liability or warranty for this information). If you have questions about a medical condition or this instruction, always ask your healthcare professional. [place] [address] any warranty or liability for your use of this information.

## 2020-02-25 NOTE — PROGRESS NOTES
Stephen Cabrera is a 78 y.o. female and presents with New Patient (Previous PCP was Dr. Noman Harvey); Hypertension; Vitamin D Deficiency; and Cholesterol Problem       Subjective:    Breast calcifications- s/p breast bx- seeing oncology- Taking evista and states she is not allergic to this.  htn- not on meds- eats healthy diet. Hyperlipidemia- on statin- no myalgia, no abd pain. katie- on cpap. Assessment/Plan:    Atypical ductal hyperplasia- on evista after evaluation from med onc. Last mammogram- 8/2019.   htn- controlled with lifestyle- no changes for now but will have low threshold to add HCTZ. Hyperlipidemia- continue statin. Last FLP 12/2019 -no changes. osa0- continue to use mask. rtc in 4 months with echocardiogram.      Diagnoses and all orders for this visit:    1. Essential hypertension  -     ECHO ADULT COMPLETE; Future    2. Murmur, cardiac  -     ECHO ADULT COMPLETE; Future    3. Atypical ductal hyperplasia of right breast    4. KATIE (obstructive sleep apnea)        RTC in   Orders Placed This Encounter    raloxifene (EVISTA) 60 mg tablet     Sig: Take 60 mg by mouth daily. ROS:  Negative except as mentioned above  Cardiac- no chest pain or palpitations  Pulmonary- no sob or wheezes  GI- no n/v or diarrhea. SH:  Social History     Tobacco Use    Smoking status: Never Smoker    Smokeless tobacco: Never Used    Tobacco comment: pt counseled to continue to not smoke. Substance Use Topics    Alcohol use: Yes     Alcohol/week: 0.0 standard drinks     Comment: occ    Drug use: No         Medications/Allergies:  Current Outpatient Medications on File Prior to Visit   Medication Sig Dispense Refill    raloxifene (EVISTA) 60 mg tablet Take 60 mg by mouth daily.  atorvastatin (LIPITOR) 10 mg tablet TAKE 1 TABLET BY MOUTH DAILY 90 Tab 0    cholecalciferol, vitamin D3, (VITAMIN D3) 2,000 unit tab Take 4,000 Int'l Units by mouth daily.       omega-3 fatty acids-vitamin e (FISH OIL) 1,000 mg cap Take 2 Caps by mouth daily.  iron 50 mg iron Tab Take 325 mg by mouth daily. Take one po daily      acetaminophen (TYLENOL ARTHRITIS PAIN) 650 mg CR tablet Take 650 mg by mouth every six (6) hours as needed. No current facility-administered medications on file prior to visit. Allergies   Allergen Reactions    Other Food Other (comments)     balsamic vinegar/vinegar: swelling in leg    Evista [Raloxifene] Other (comments)     Per pt stopped due to rash. Objective:  Visit Vitals  /81   Pulse 74   Temp 98.2 °F (36.8 °C) (Oral)   Resp 17   Ht 5' 11\" (1.803 m)   Wt 161 lb (73 kg)   SpO2 98%   BMI 22.45 kg/m²    Body mass index is 22.45 kg/m². Constitutional: Well developed, nourished, no distress, alert   HENT: Exterior ears and tympanic membranes normal bilaterally. Supple neck. No thyromegaly or lymphadenopathy. Oropharynx clear and moist mucous membranes. Eyes: Conjunctiva normal. PERRL. CV: S1, S2.  RRR. No murmurs/rubs. No thrills palpated. No carotid bruits. Intact distal pulses. No edema. Pulm: No abnormalities on inspection. Clear to auscultation bilaterally. No wheezing/rhonchi. Normal effort. GI: Soft, nontender, nondistended. Normal active bowel sounds. No  masses on palpation. No hepatosplenomegaly.

## 2020-02-25 NOTE — PROGRESS NOTES
1. Have you been to the ER, urgent care clinic since your last visit? Hospitalized since your last visit? No.     2. Have you seen or consulted any other health care providers outside of the [address] [place] [address] since your last visit? Include any pap smears or colon screening.  No.    Chief Complaint   Patient presents with    New Patient     Previous PCP was Dr. Stacey Alexander    Hypertension    Vitamin D Deficiency    Cholesterol Problem

## 2020-06-17 ENCOUNTER — TELEPHONE (OUTPATIENT)
Dept: FAMILY MEDICINE CLINIC | Age: 70
End: 2020-06-17

## 2020-07-07 DIAGNOSIS — I10 ESSENTIAL HYPERTENSION: ICD-10-CM

## 2020-07-07 DIAGNOSIS — R01.1 MURMUR, CARDIAC: ICD-10-CM

## 2020-07-30 ENCOUNTER — VIRTUAL VISIT (OUTPATIENT)
Dept: FAMILY MEDICINE CLINIC | Age: 70
End: 2020-07-30

## 2020-07-30 DIAGNOSIS — Z13.39 SCREENING FOR ALCOHOLISM: ICD-10-CM

## 2020-07-30 DIAGNOSIS — Z00.00 MEDICARE ANNUAL WELLNESS VISIT, SUBSEQUENT: ICD-10-CM

## 2020-07-30 DIAGNOSIS — Z13.31 SCREENING FOR DEPRESSION: ICD-10-CM

## 2020-07-30 DIAGNOSIS — G47.33 OSA (OBSTRUCTIVE SLEEP APNEA): ICD-10-CM

## 2020-07-30 DIAGNOSIS — E55.9 VITAMIN D DEFICIENCY: ICD-10-CM

## 2020-07-30 DIAGNOSIS — E78.5 DYSLIPIDEMIA: ICD-10-CM

## 2020-07-30 DIAGNOSIS — R01.1 MURMUR, CARDIAC: ICD-10-CM

## 2020-07-30 DIAGNOSIS — I10 ESSENTIAL HYPERTENSION: Primary | ICD-10-CM

## 2020-07-30 DIAGNOSIS — N60.91 ATYPICAL DUCTAL HYPERPLASIA OF RIGHT BREAST: ICD-10-CM

## 2020-07-30 RX ORDER — ATORVASTATIN CALCIUM 40 MG/1
TABLET, FILM COATED ORAL
Qty: 90 TAB | Refills: 3 | Status: SHIPPED | OUTPATIENT
Start: 2020-07-30

## 2020-07-30 NOTE — PATIENT INSTRUCTIONS
DASH Diet: Care Instructions Your Care Instructions The DASH diet is an eating plan that can help lower your blood pressure. DASH stands for Dietary Approaches to Stop Hypertension. Hypertension is high blood pressure. The DASH diet focuses on eating foods that are high in calcium, potassium, and magnesium. These nutrients can lower blood pressure. The foods that are highest in these nutrients are fruits, vegetables, low-fat dairy products, nuts, seeds, and legumes. But taking calcium, potassium, and magnesium supplements instead of eating foods that are high in those nutrients does not have the same effect. The DASH diet also includes whole grains, fish, and poultry. The DASH diet is one of several lifestyle changes your doctor may recommend to lower your high blood pressure. Your doctor may also want you to decrease the amount of sodium in your diet. Lowering sodium while following the DASH diet can lower blood pressure even further than just the DASH diet alone. Follow-up care is a key part of your treatment and safety. Be sure to make and go to all appointments, and call your doctor if you are having problems. It's also a good idea to know your test results and keep a list of the medicines you take. How can you care for yourself at home? Following the DASH diet · Eat 4 to 5 servings of fruit each day. A serving is 1 medium-sized piece of fruit, ½ cup chopped or canned fruit, 1/4 cup dried fruit, or 4 ounces (½ cup) of fruit juice. Choose fruit more often than fruit juice. · Eat 4 to 5 servings of vegetables each day. A serving is 1 cup of lettuce or raw leafy vegetables, ½ cup of chopped or cooked vegetables, or 4 ounces (½ cup) of vegetable juice. Choose vegetables more often than vegetable juice. · Get 2 to 3 servings of low-fat and fat-free dairy each day. A serving is 8 ounces of milk, 1 cup of yogurt, or 1 ½ ounces of cheese. · Eat 6 to 8 servings of grains each day. A serving is 1 slice of bread, 1 ounce of dry cereal, or ½ cup of cooked rice, pasta, or cooked cereal. Try to choose whole-grain products as much as possible. · Limit lean meat, poultry, and fish to 2 servings each day. A serving is 3 ounces, about the size of a deck of cards. · Eat 4 to 5 servings of nuts, seeds, and legumes (cooked dried beans, lentils, and split peas) each week. A serving is 1/3 cup of nuts, 2 tablespoons of seeds, or ½ cup of cooked beans or peas. · Limit fats and oils to 2 to 3 servings each day. A serving is 1 teaspoon of vegetable oil or 2 tablespoons of salad dressing. · Limit sweets and added sugars to 5 servings or less a week. A serving is 1 tablespoon jelly or jam, ½ cup sorbet, or 1 cup of lemonade. · Eat less than 2,300 milligrams (mg) of sodium a day. If you limit your sodium to 1,500 mg a day, you can lower your blood pressure even more. Tips for success · Start small. Do not try to make dramatic changes to your diet all at once. You might feel that you are missing out on your favorite foods and then be more likely to not follow the plan. Make small changes, and stick with them. Once those changes become habit, add a few more changes. · Try some of the following: ? Make it a goal to eat a fruit or vegetable at every meal and at snacks. This will make it easy to get the recommended amount of fruits and vegetables each day. ? Try yogurt topped with fruit and nuts for a snack or healthy dessert. ? Add lettuce, tomato, cucumber, and onion to sandwiches. ? Combine a ready-made pizza crust with low-fat mozzarella cheese and lots of vegetable toppings. Try using tomatoes, squash, spinach, broccoli, carrots, cauliflower, and onions. ? Have a variety of cut-up vegetables with a low-fat dip as an appetizer instead of chips and dip. ? Sprinkle sunflower seeds or chopped almonds over salads.  Or try adding chopped walnuts or almonds to cooked vegetables. ? Try some vegetarian meals using beans and peas. Add garbanzo or kidney beans to salads. Make burritos and tacos with mashed henderson beans or black beans. Where can you learn more? Go to http://www.gray.com/ Enter V450 in the search box to learn more about \"DASH Diet: Care Instructions. \" Current as of: December 16, 2019               Content Version: 12.5 © 2128-9276 [employer]. Care instructions adapted under license by [employer] (which disclaims liability or warranty for this information). If you have questions about a medical condition or this instruction, always ask your healthcare professional. [address] any warranty or liability for your use of this information. Medicare Wellness Visit, Female The best way to live healthy is to have a lifestyle where you eat a well-balanced diet, exercise regularly, limit alcohol use, and quit all forms of tobacco/nicotine, if applicable. Regular preventive services are another way to keep healthy. Preventive services (vaccines, screening tests, monitoring & exams) can help personalize your care plan, which helps you manage your own care. Screening tests can find health problems at the earliest stages, when they are easiest to treat. [facility] follows the current, evidence-based guidelines published by the [facility] [place] [employer] (USPSTF) when recommending preventive services for our patients. Because we follow these guidelines, sometimes recommendations change over time as research supports it. (For example, mammograms used to be recommended annually. Even though Medicare will still pay for an annual mammogram, the newer guidelines recommend a mammogram every two years for women of average risk).   Of course, you and your doctor may decide to screen more often for some diseases, based on your risk and your co-morbidities (chronic disease you are already diagnosed with). Preventive services for you include: - Medicare offers their members a free annual wellness visit, which is time for you and your primary care provider to discuss and plan for your preventive service needs. Take advantage of this benefit every year! 
-All adults over the age of 72 should receive the recommended pneumonia vaccines. Current USPSTF guidelines recommend a series of two vaccines for the best pneumonia protection.  
-All adults should have a flu vaccine yearly and a tetanus vaccine every 10 years.  
-All adults age 48 and older should receive the shingles vaccines (series of two vaccines). -All adults age 38-68 who are overweight should have a diabetes screening test once every three years.  
-All adults born between [birth year] and 1966 should be screened once for Hepatitis C. 
-Other screening tests and preventive services for persons with diabetes include: an eye exam to screen for diabetic retinopathy, a kidney function test, a foot exam, and stricter control over your cholesterol.  
-Cardiovascular screening for adults with routine risk involves an electrocardiogram (ECG) at intervals determined by your doctor.  
-Colorectal cancer screenings should be done for adults age 54-65 with no increased risk factors for colorectal cancer. There are a number of acceptable methods of screening for this type of cancer. Each test has its own benefits and drawbacks. Discuss with your doctor what is most appropriate for you during your annual wellness visit. The different tests include: colonoscopy (considered the best screening method), a fecal occult blood test, a fecal DNA test, and sigmoidoscopy. 
 
-A bone mass density test is recommended when a woman turns 65 to screen for osteoporosis. This test is only recommended one time, as a screening. Some providers will use this same test as a disease monitoring tool if you already have osteoporosis. -Breast cancer screenings are recommended every other year for women of normal risk, age 54-69. 
-Cervical cancer screenings for women over age 72 are only recommended with certain risk factors. Here is a list of your current Health Maintenance items (your personalized list of preventive services) with a due date: 
Health Maintenance Due Topic Date Due  
 Annual Well Visit  06/12/2020

## 2020-07-30 NOTE — PROGRESS NOTES
1. Have you been to the ER, urgent care clinic since your last visit? Hospitalized since your last visit? No.     2. Have you seen or consulted any other health care providers outside of the [address] [place] [address] since your last visit? Include any pap smears or colon screening.  No.     Chief Complaint   Patient presents with    Follow Up Chronic Condition    Hypertension    Heart Murmur     Had echo done in

## 2020-07-30 NOTE — ACP (ADVANCE CARE PLANNING)
Advance Care Planning       Advance Care Planning (ACP) Physician/NP/PA (Provider) Conversation        Date of ACP Conversation: 7/30/2020    Conversation Conducted with:   Patient with Decision Making Dania Bello Maker:    Current Designated Health Care Decision Maker:   (If there is a valid [address] named in the [address]" box in the ACP activity, but it is not visible above, be sure to open that field and then select the health care decision maker relationship (ie \"primary\") in the blank space to the right of the name.)    Note: Assess and validate information in current ACP documents, as indicated. If no Authorized Decision Maker has previously been identified, then patient chooses [address]:  \"Who would you like to name as your primary health care decision-maker? \"    Name: Christiano Ordoñez Relationship:Daughter Phone number: [phone]      Note: If the relationship of these Decision-Makers to the patient does NOT follow your state's Next of Kin hierarchy, recommend that patient complete ACP document that meets state-specific requirements to allow them to act on the patient's behalf when appropriate. Care Preferences:    Hospitalization: \"If your health worsens and it becomes clear that your chance of recovery is unlikely, what would your preference be regarding hospitalization? \"  If the patient would want hospitalization, answer \"yes\". If the patient would prefer comfort-focused treatment without hospitalization, answer \"no\". yes      Ventilation: \"If you were in your present state of health and suddenly became very ill and were unable to breathe on your own, what would your preference be about the use of a ventilator (breathing machine) if it was available to you? \"    If patient would desire the use of a ventilator (breathing machine), answer \"yes\", if not answer \"no\":yes    \"If your health worsens and it becomes clear that your chance of recovery is unlikely, what would your preference be about the use of a ventilator (breathing machine) if it was available to you? \"   no      Resuscitation:  \"CPR works best to restart the heart when there is a sudden event, like a heart attack, in someone who is otherwise healthy. Unfortunately, CPR does not typically restart the heart for people who have serious health conditions or who are very sick. \"    \"In the event your heart stopped as a result of an underlying serious health condition, would you want attempts to be made to restart your heart (answer \"yes\" for attempt to resuscitate) or would you prefer a natural death (answer \"no\" for do not attempt to resuscitate)? \"   yes    NOTE: If the patient has a valid advance directive AND provides care preference(s) that are inconsistent with that prior directive, advise the patient to consider either: creating a new advance directive that complies with state-specific requirements; or, if that is not possible, orally revoking that prior directive in accordance with state-specific requirements, which must be documented in the EHR.     Conversation Outcomes / Follow-Up Plan:   Recommended completion of Advance Directive      Length of Voluntary ACP Conversation in minutes:  <16 minutes (Non-Billable)      Anjali Hollis MD

## 2020-07-30 NOTE — PROGRESS NOTES
Cheyenne Marina is a 69 y.o. female who was seen by synchronous (real-time) audio-video technology on 7/30/2020 for Follow Up Chronic Condition; Hypertension; and Heart Murmur (Had echocardiogram done on 6/9/2020 at [facility].)        Assessment & Plan:   Diagnoses and all orders for this visit:    1. Essential hypertension  -     AMB SUPPLY ORDER    2. Murmur, cardiac    3. Dyslipidemia  -     atorvastatin (LIPITOR) 40 mg tablet; One tab daily, PO    4. KATIE (obstructive sleep apnea)    5. Atypical ductal hyperplasia of right breast    6. Vitamin D deficiency    7. Medicare annual wellness visit, subsequent    8. Screening for alcoholism    9. Screening for depression        Atypical ductal hyperplasia- on evista after evaluation from med onc. Last mammogram- 8/2019. Keep f/u with oncology  htn- controlled with lifestyle- no changes for now but will have low threshold to add HCTZ. Prescription for home monitor written and will be mailed to her. Murmurreviewed echocardiogram in great detail with patientno valvular issues and only possible diastolic dysfunction foundmaintain blood pressure control discussed  Hyperlipidemia- continue statin but will increase to anal point. Last FLP 12/2019 -no changes. katie- on cpap- continue to use mask.    vit D deficiency - continue supplement-     rtc in 6 months with labs    25 minutes spent on phone  With pt discussing problems. Subjective:     Breast calcifications- s/p breast bx- seeing oncology NP at [facility] and states she is not allergic to this.  htn- not on meds- eats healthy diet. Hyperlipidemia- on statin- no myalgia, no abd pain. katie- on cpap. Prior to Admission medications    Medication Sig Start Date End Date Taking? Authorizing Provider   atorvastatin (LIPITOR) 10 mg tablet TAKE 1 TABLET BY MOUTH DAILY 3/31/20  Yes Shelli Moran MD   raloxifene (EVISTA) 60 mg tablet Take 60 mg by mouth daily.  2/17/20  Yes Provider, Historical   cholecalciferol, vitamin D3, (VITAMIN D3) 2,000 unit tab Take 4,000 Int'l Units by mouth daily. Yes Provider, Historical   omega-3 fatty acids-vitamin e (FISH OIL) 1,000 mg cap Take 2 Caps by mouth daily. Yes Provider, Historical   iron 50 mg iron Tab Take 325 mg by mouth daily. Take one po daily   Yes Provider, Historical   acetaminophen (TYLENOL ARTHRITIS PAIN) 650 mg CR tablet Take 650 mg by mouth every six (6) hours as needed. Yes Provider, Historical     Patient Active Problem List   Diagnosis Code    Dyslipidemia E78.5    Sickle cell trait (HCC) D57.3    Microcalcifications of the breast R92.0    Vitamin D deficiency E55.9    Fibrocystic breast changes N60.19    H/O colonoscopy Z98.890    Advance directive discussed with patient Z70.80    PAOLA (obstructive sleep apnea) G47.33    Atypical ductal hyperplasia of right breast N60.91    Murmur, cardiac R01.1     Patient Active Problem List    Diagnosis Date Noted    Murmur, cardiac 02/25/2020    Atypical ductal hyperplasia of right breast 09/20/2018    PAOLA (obstructive sleep apnea) 03/02/2017    Advance directive discussed with patient     H/O colonoscopy 08/12/2014    Fibrocystic breast changes 01/28/2014    Vitamin D deficiency     Microcalcifications of the breast     Sickle cell trait ([facility] [address] 09/05/2013    Dyslipidemia 04/23/2013     Current Outpatient Medications   Medication Sig Dispense Refill    atorvastatin (LIPITOR) 10 mg tablet TAKE 1 TABLET BY MOUTH DAILY 90 Tab 1    raloxifene (EVISTA) 60 mg tablet Take 60 mg by mouth daily.  cholecalciferol, vitamin D3, (VITAMIN D3) 2,000 unit tab Take 4,000 Int'l Units by mouth daily.  omega-3 fatty acids-vitamin e (FISH OIL) 1,000 mg cap Take 2 Caps by mouth daily.  iron 50 mg iron Tab Take 325 mg by mouth daily. Take one po daily      acetaminophen (TYLENOL ARTHRITIS PAIN) 650 mg CR tablet Take 650 mg by mouth every six (6) hours as needed.        Allergies Allergen Reactions    Other Food Other (comments)     balsamic vinegar/vinegar: swelling in leg     Past Medical History:   Diagnosis Date    Advance directive discussed with patient 09/06/2016    Atypical ductal hyperplasia of right breast 2018    Being followed by Dr. Dorita Luis.  Dyslipidemia     H/O colonoscopy 08/12/14    Done by Dr. Gant Cardinal: diverticular dz & hemorrhoids, repeat in 10 years    Hyperlipemia     Hypertension     Microcalcifications of the breast 2013    left breast s/p Vacuum-assisted core needle biopsy of the left breast with placement of localizing clip  6/18/2013. Being followed by Dr. Emilio Martell. Suppose to have mammoram in 6 months. Path showed firbrocystic changes    PAOLA (obstructive sleep apnea) 03/02/2017    on CPAP machine. Being followed by Dr. Ronald Ledezma    Sickle cell trait ([facility] [address]     Vitamin D deficiency      Past Surgical History:   Procedure Laterality Date    HX BREAST BIOPSY Bilateral 1994    [facility]ilateral [facility]    HX BREAST LUMPECTOMY Right 10/2/2018    RIGHT BREAST NEEDLE LOC [facility]AL [facility] [address] [place] [address]    unsure of exact date 3979-2621, partial hysterectomy. kept both ovaries    HX TONSIL AND ADENOIDECTOMY  1962    ANIBAL BIOPSY BREAST STEREOTACTIC Left 2013    benign    [place] KNEE SCOPE,MED OR LAT MENIS REPAIR  2006    right knee     Family History   Problem Relation Age of Onset   Valri Pio Hypertension Mother     Diabetes Mother     Hypertension Father     Hypertension Maternal Grandmother     Hypertension Maternal Grandfather      Social History     Tobacco Use    Smoking status: Never Smoker    Smokeless tobacco: Never Used    Tobacco comment: pt counseled to continue to not smoke. Substance Use Topics    Alcohol use:  Yes     Alcohol/week: 0.0 standard drinks     Comment: occ       ROS  Cardiac- no chest pain or palpitations  Pulmonary- no sob or wheezes  GI- no n/v or diarrhea. Objective:   No flowsheet data found. [INSTRUCTIONS:  \"[x]\" Indicates a positive item  \"[]\" Indicates a negative item  -- DELETE ALL ITEMS NOT EXAMINED]      We discussed the expected course, resolution and complications of the diagnosis(es) in detail. Medication risks, benefits, costs, interactions, and alternatives were discussed as indicated. I advised her to contact the office if her condition worsens, changes or fails to improve as anticipated. She expressed understanding with the diagnosis(es) and plan. Elsie Ayon, who was evaluated through a patient-initiated, synchronous (real-time) audio-video encounter, and/or her healthcare decision maker, is aware that it is a billable service, with coverage as determined by her insurance carrier. She provided verbal consent to proceed: Yes, and patient identification was verified. It was conducted pursuant to the emergency declaration under the [address] [place] [address] and the [employer] General Act. A caregiver was present when appropriate. Ability to conduct physical exam was limited. I was at home. The patient was at home. Francisco J Contreras MD          This is the Subsequent Medicare Annual Wellness Exam, performed 12 months or more after the Initial AWV or the last Subsequent AWV    I have reviewed the patient's medical history in detail and updated the computerized patient record.      History     Patient Active Problem List   Diagnosis Code    Dyslipidemia E78.5    Sickle cell trait (HCC) D57.3    Microcalcifications of the breast R92.0    Vitamin D deficiency E55.9    Fibrocystic breast changes N60.19    H/O colonoscopy Z98.890    Advance directive discussed with patient Z70.80    PAOLA (obstructive sleep apnea) G47.33    Atypical ductal hyperplasia of right breast N60.91    Murmur, cardiac R01.1     Past Medical History: Diagnosis Date    Advance directive discussed with patient 09/06/2016    Atypical ductal hyperplasia of right breast 2018    Being followed by Dr. Shakila Branham.  Dyslipidemia     H/O colonoscopy 08/12/14    Done by Dr. Sylvester Mallory: diverticular dz & hemorrhoids, repeat in 10 years    Hyperlipemia     Hypertension     Microcalcifications of the breast 2013    left breast s/p Vacuum-assisted core needle biopsy of the left breast with placement of localizing clip  6/18/2013. Being followed by Dr. Bahman Mclean. Suppose to have mammoram in 6 months. Path showed firbrocystic changes    PAOLA (obstructive sleep apnea) 03/02/2017    on CPAP machine. Being followed by Dr. Sabine Pruitt    Sickle cell trait ([facility] [address]     Vitamin D deficiency       Past Surgical History:   Procedure Laterality Date    HX BREAST BIOPSY Bilateral 1994    [facility]    HX BREAST LUMPECTOMY Right 10/2/2018    RIGHT BREAST NEEDLE LOC EXCISIONAL BIOPSY performed by Leonel Maurer MD at [address] [place] [address]    unsure of exact date [id number], partial hysterectomy. kept both ovaries    HX TONSIL AND ADENOIDECTOMY  1962    ANIBAL BIOPSY BREAST STEREOTACTIC Left 2013    benign    [place] KNEE SCOPE,MED OR LAT MENIS REPAIR  2006    right knee     Current Outpatient Medications   Medication Sig Dispense Refill    atorvastatin (LIPITOR) 40 mg tablet One tab daily, PO 90 Tab 3    raloxifene (EVISTA) 60 mg tablet Take 60 mg by mouth daily.  cholecalciferol, vitamin D3, (VITAMIN D3) 2,000 unit tab Take 4,000 Int'l Units by mouth daily.  omega-3 fatty acids-vitamin e (FISH OIL) 1,000 mg cap Take 2 Caps by mouth daily.  iron 50 mg iron Tab Take 325 mg by mouth daily. Take one po daily      acetaminophen (TYLENOL ARTHRITIS PAIN) 650 mg CR tablet Take 650 mg by mouth every six (6) hours as needed.        Allergies   Allergen Reactions    Other Food Other (comments)     balsamic vinegar/vinegar: swelling in leg       Family History   Problem Relation Age of Onset   Keenan Costello Hypertension Mother     Diabetes Mother     Hypertension Father     Hypertension Maternal Grandmother     Hypertension Maternal Grandfather      Social History     Tobacco Use    Smoking status: Never Smoker    Smokeless tobacco: Never Used    Tobacco comment: pt counseled to continue to not smoke. Substance Use Topics    Alcohol use: Yes     Alcohol/week: 0.0 standard drinks     Comment: occ       Depression Risk Factor Screening:     3 most recent PHQ Screens 7/30/2020   Little interest or pleasure in doing things Not at all   Feeling down, depressed, irritable, or hopeless Not at all   Total Score PHQ 2 0       Alcohol Risk Factor Screening:   Do you average 1 drink per night or more than 7 drinks a week:  No    On any one occasion in the past three months have you have had more than 3 drinks containing alcohol:  No      Functional Ability and Level of Safety:   Hearing: Hearing is good. Activities of Daily Living: The home contains: grab bars  Patient does total self care     Ambulation: with mild difficulty occasionally uses a cane. Fall Risk:  Fall Risk Assessment, last 12 mths 7/30/2020   Able to walk? Yes   Fall in past 12 months? Yes   Fall with injury?  No   Number of falls in past 12 months 1   Fall Risk Score 1     Abuse Screen:  Patient is not abused       Cognitive Screening   Has your family/caregiver stated any concerns about your memory: no    Cognitive Screening: Normal - 15 animals named in less than one minute    Patient Care Team   Patient Care Team:  Huan Yanez MD as PCP - General (Internal Medicine)  Huan Yanez MD as PCP - REHABILITATION [facility] Empaneled Provider  Yusuf Toney., RN as Nurse Triston Garcia MD (General Surgery)  Juan M Goncalves MD as Physician (Ophthalmology)  Doug Disla MD (Breast Surgery)    Assessment/Plan   Education and counseling provided:  Are appropriate based on today's review and evaluation  End-of-Life planning (with patient's consent)    Diagnoses and all orders for this visit:    1. Essential hypertension  -     AMB SUPPLY ORDER    2. Murmur, cardiac    3. Dyslipidemia  -     atorvastatin (LIPITOR) 40 mg tablet; One tab daily, PO    4. PAOLA (obstructive sleep apnea)    5. Atypical ductal hyperplasia of right breast    6. Vitamin D deficiency    7. Medicare annual wellness visit, subsequent    8. Screening for alcoholism    9. Screening for depression        Health Maintenance Due   Topic Date Due    Medicare Yearly Exam  06/12/2020       Jaguar Almeida, who was evaluated through a synchronous (real-time) audio only encounter, and/or her healthcare decision maker, is aware that it is a billable service, with coverage as determined by her insurance carrier. She provided verbal consent to proceed: Yes, and patient identification was verified. It was conducted pursuant to the emergency declaration under the [address] [place] [address], [address] [place] [address] authority and the [employer] General Act. A caregiver was present when appropriate. Ability to conduct physical exam was limited. I was at home. The patient was at home.     Lisa Costello MD

## 2020-09-08 ENCOUNTER — DOCUMENTATION ONLY (OUTPATIENT)
Dept: FAMILY MEDICINE CLINIC | Age: 70
End: 2020-09-08

## 2020-09-13 NOTE — PROGRESS NOTES
Please let pt know that labs were normal except:    1) Alk phos little up. Is she having an RUQ pain, n/v, yellowing of eyes or skin, n/v?    2) lipids are okay. LDL little up at 102 and needs to 100 or less. Continue to work on diet and exercise. negative No oral lesions; no gross abnormalities

## 2020-09-27 DIAGNOSIS — E78.5 DYSLIPIDEMIA: ICD-10-CM

## 2020-10-01 RX ORDER — ATORVASTATIN CALCIUM 10 MG/1
TABLET, FILM COATED ORAL
Qty: 90 TAB | Refills: 1 | Status: SHIPPED | OUTPATIENT
Start: 2020-10-01

## 2020-12-03 ENCOUNTER — HOSPITAL ENCOUNTER (OUTPATIENT)
Dept: LAB | Age: 70
Discharge: HOME | End: 2020-12-03
Payer: MEDICARE

## 2020-12-03 ENCOUNTER — APPOINTMENT (OUTPATIENT)
Dept: FAMILY MEDICINE CLINIC | Age: 70
End: 2020-12-03

## 2020-12-03 DIAGNOSIS — R01.1 MURMUR, CARDIAC: ICD-10-CM

## 2020-12-03 DIAGNOSIS — I10 ESSENTIAL HYPERTENSION: ICD-10-CM

## 2020-12-03 DIAGNOSIS — E55.9 VITAMIN D DEFICIENCY: ICD-10-CM

## 2020-12-03 DIAGNOSIS — E78.5 DYSLIPIDEMIA: ICD-10-CM

## 2020-12-03 LAB
25(OH)D3 SERPL-MCNC: 48 NG/ML (ref 30–100)
ALBUMIN SERPL-MCNC: 3.6 G/DL (ref 3.4–5)
ALBUMIN/GLOB SERPL: 1.2 {RATIO} (ref 0.8–1.7)
ALP SERPL-CCNC: 85 U/L (ref 45–117)
ALT SERPL-CCNC: 25 U/L (ref 13–56)
ANION GAP SERPL CALC-SCNC: 9 MMOL/L (ref 3–18)
APPEARANCE UR: CLEAR
AST SERPL-CCNC: 20 U/L (ref 10–38)
BILIRUB SERPL-MCNC: 0.5 MG/DL (ref 0.2–1)
BILIRUB UR QL: NEGATIVE
BUN SERPL-MCNC: 15 MG/DL (ref 7–18)
BUN/CREAT SERPL: 31 (ref 12–20)
CALCIUM SERPL-MCNC: 8.8 MG/DL (ref 8.5–10.1)
CHLORIDE SERPL-SCNC: 107 MMOL/L (ref 100–111)
CHOLEST SERPL-MCNC: 219 MG/DL
CO2 SERPL-SCNC: 28 MMOL/L (ref 21–32)
COLOR UR: YELLOW
CREAT SERPL-MCNC: 0.49 MG/DL (ref 0.6–1.3)
ERYTHROCYTE [DISTWIDTH] IN BLOOD BY AUTOMATED COUNT: 14.3 % (ref 11.6–14.5)
GLOBULIN SER CALC-MCNC: 3.1 G/DL (ref 2–4)
GLUCOSE SERPL-MCNC: 90 MG/DL (ref 74–99)
GLUCOSE UR STRIP.AUTO-MCNC: NEGATIVE MG/DL
HCT VFR BLD AUTO: 36.3 % (ref 35–45)
HDLC SERPL-MCNC: 115 MG/DL (ref 40–60)
HDLC SERPL: 1.9 {RATIO} (ref 0–5)
HGB BLD-MCNC: 12.3 G/DL (ref 12–16)
HGB UR QL STRIP: NEGATIVE
KETONES UR QL STRIP.AUTO: NEGATIVE MG/DL
LDLC SERPL CALC-MCNC: 93 MG/DL (ref 0–100)
LEUKOCYTE ESTERASE UR QL STRIP.AUTO: NEGATIVE
LIPID PROFILE,FLP: ABNORMAL
MCH RBC QN AUTO: 30.4 PG (ref 24–34)
MCHC RBC AUTO-ENTMCNC: 33.9 G/DL (ref 31–37)
MCV RBC AUTO: 89.6 FL (ref 74–97)
NITRITE UR QL STRIP.AUTO: NEGATIVE
PH UR STRIP: 7 [PH] (ref 5–8)
PLATELET # BLD AUTO: 326 K/UL (ref 135–420)
PMV BLD AUTO: 10.2 FL (ref 9.2–11.8)
POTASSIUM SERPL-SCNC: 3.9 MMOL/L (ref 3.5–5.5)
PROT SERPL-MCNC: 6.7 G/DL (ref 6.4–8.2)
PROT UR STRIP-MCNC: NEGATIVE MG/DL
RBC # BLD AUTO: 4.05 M/UL (ref 4.2–5.3)
SODIUM SERPL-SCNC: 144 MMOL/L (ref 136–145)
SP GR UR REFRACTOMETRY: <1.005 (ref 1–1.03)
TRIGL SERPL-MCNC: 55 MG/DL (ref ?–150)
TSH SERPL DL<=0.05 MIU/L-ACNC: 0.96 UIU/ML (ref 0.36–3.74)
UROBILINOGEN UR QL STRIP.AUTO: 0.2 EU/DL (ref 0.2–1)
VLDLC SERPL CALC-MCNC: 11 MG/DL
WBC # BLD AUTO: 4.4 K/UL (ref 4.6–13.2)

## 2020-12-03 PROCEDURE — 84443 ASSAY THYROID STIM HORMONE: CPT

## 2020-12-03 PROCEDURE — 80061 LIPID PANEL: CPT

## 2020-12-03 PROCEDURE — 85027 COMPLETE CBC AUTOMATED: CPT

## 2020-12-03 PROCEDURE — 80053 COMPREHEN METABOLIC PANEL: CPT

## 2020-12-03 PROCEDURE — 81003 URINALYSIS AUTO W/O SCOPE: CPT

## 2020-12-03 PROCEDURE — 82306 VITAMIN D 25 HYDROXY: CPT

## 2020-12-10 ENCOUNTER — VIRTUAL VISIT (OUTPATIENT)
Dept: FAMILY MEDICINE CLINIC | Age: 70
End: 2020-12-10
Payer: MEDICARE

## 2020-12-10 DIAGNOSIS — I10 ESSENTIAL HYPERTENSION: Primary | ICD-10-CM

## 2020-12-10 DIAGNOSIS — R01.1 MURMUR, CARDIAC: ICD-10-CM

## 2020-12-10 DIAGNOSIS — G47.33 OSA (OBSTRUCTIVE SLEEP APNEA): ICD-10-CM

## 2020-12-10 DIAGNOSIS — N60.91 ATYPICAL DUCTAL HYPERPLASIA OF RIGHT BREAST: ICD-10-CM

## 2020-12-10 DIAGNOSIS — E55.9 VITAMIN D DEFICIENCY: ICD-10-CM

## 2020-12-10 PROCEDURE — 99442 PR PHYS/QHP TELEPHONE EVALUATION 11-20 MIN: CPT | Performed by: INTERNAL MEDICINE

## 2020-12-10 NOTE — PROGRESS NOTES
1. Have you been to the ER, urgent care clinic since your last visit? Hospitalized since your last visit? No    2. Have you seen or consulted any other health care providers outside of the [address] [place] [address] since your last visit? Include any pap smears or colon screening.  No

## 2020-12-10 NOTE — PROGRESS NOTES
Unruly Luke is a 69 y.o. female who was seen by synchronous (real-time) audio only technology on 12/10/2020 for Follow Up Chronic Condition and Results (labs)        Assessment & Plan:   Diagnoses and all orders for this visit:    1. Essential hypertension    2. Murmur, cardiac    3. PAOLA (obstructive sleep apnea)    4. Vitamin D deficiency    5. Atypical ductal hyperplasia of right breast        Atypical ductal hyperplasia- on evista after evaluation from med onc. Last mammogram- 8/2020- f/u in 6 months recommended. Keep f/u with oncology  htn- controlled with lifestyle- no changes for now but will have low threshold to add HCTZ. Prescription for home monitor written and will be mailed to her. Murmurreviewed echocardiogram 6/2020 in great detail with patientno valvular issues and only possible diastolic dysfunction foundmaintain blood pressure control discussed  Hyperlipidemia- continue statin but will increase to anal point. Last FLP 12/2019 -no changes. paola- on cpap- continue to use mask.    vit D deficiency - continue supplement-     rtc in 6 months with labs    15 minutes spent on phone  With pt discussing problems. Subjective:     Breast calcifications- s/p breast bx- seeing oncology [facility] at [facility] and states she is not allergic to this.  htn- not on meds- eats healthy diet. bp at home 125//87. Hyperlipidemia- on statin- no myalgia, no abd pain. paola- on cpap. Vit D deficiency- no bone pain or cramping.     Lab Results   Component Value Date/Time    Vitamin D 25-Hydroxy 48.0 12/03/2020 09:00 AM       Lab Results   Component Value Date/Time    TSH 0.96 12/03/2020 09:00 AM     Lab Results   Component Value Date/Time    Cholesterol, total 219 (H) 12/03/2020 09:00 AM    HDL Cholesterol 115 (H) 12/03/2020 09:00 AM    LDL, calculated 93 12/03/2020 09:00 AM    VLDL, calculated 11 12/03/2020 09:00 AM    Triglyceride 55 12/03/2020 09:00 AM    CHOL/HDL Ratio 1.9 12/03/2020 09:00 AM     Lab Results   Component Value Date/Time    WBC 4.4 (L) 12/03/2020 09:00 AM    HGB 12.3 12/03/2020 09:00 AM    HCT 36.3 12/03/2020 09:00 AM    PLATELET 101 76/61/9111 09:00 AM    MCV 89.6 12/03/2020 09:00 AM     Lab Results   Component Value Date/Time    Sodium 144 12/03/2020 09:00 AM    Potassium 3.9 12/03/2020 09:00 AM    Chloride 107 12/03/2020 09:00 AM    CO2 28 12/03/2020 09:00 AM    Anion gap 9 12/03/2020 09:00 AM    Glucose 90 12/03/2020 09:00 AM    BUN 15 12/03/2020 09:00 AM    Creatinine 0.49 (L) 12/03/2020 09:00 AM    BUN/Creatinine ratio 31 (H) 12/03/2020 09:00 AM    GFR est AA >60 12/03/2020 09:00 AM    GFR est non-AA >60 12/03/2020 09:00 AM    Calcium 8.8 12/03/2020 09:00 AM    Bilirubin, total 0.5 12/03/2020 09:00 AM    Alk. phosphatase 85 12/03/2020 09:00 AM    Protein, total 6.7 12/03/2020 09:00 AM    Albumin 3.6 12/03/2020 09:00 AM    Globulin 3.1 12/03/2020 09:00 AM    A-G Ratio 1.2 12/03/2020 09:00 AM    ALT (SGPT) 25 12/03/2020 09:00 AM    AST (SGOT) 20 12/03/2020 09:00 AM           Prior to Admission medications    Medication Sig Start Date End Date Taking? Authorizing Provider   atorvastatin (LIPITOR) 40 mg tablet One tab daily, PO 7/30/20  Yes Vanessa Lucero MD   raloxifene (EVISTA) 60 mg tablet Take 60 mg by mouth daily. 2/17/20  Yes Provider, Historical   cholecalciferol, vitamin D3, (VITAMIN D3) 2,000 unit tab Take 4,000 Int'l Units by mouth daily. Yes Provider, Historical   omega-3 fatty acids-vitamin e (FISH OIL) 1,000 mg cap Take 2 Caps by mouth daily. Yes Provider, Historical   iron 50 mg iron Tab Take 325 mg by mouth daily. Take one po daily   Yes Provider, Historical   acetaminophen (TYLENOL ARTHRITIS PAIN) 650 mg CR tablet Take 650 mg by mouth every six (6) hours as needed.    Yes Provider, Historical   atorvastatin (LIPITOR) 10 mg tablet TAKE 1 TABLET BY MOUTH DAILY 10/1/20   Vanessa Lucero MD     Patient Active Problem List   Diagnosis Code    Dyslipidemia E78.5    Sickle cell trait (HCC) D57.3    Microcalcifications of the breast R92.0    Vitamin D deficiency E55.9    Fibrocystic breast changes N60.19    H/O colonoscopy Z98.890    Advance directive discussed with patient Z70.80    PAOLA (obstructive sleep apnea) G47.33    Atypical ductal hyperplasia of right breast N60.91    Murmur, cardiac R01.1     Patient Active Problem List    Diagnosis Date Noted    Murmur, cardiac 02/25/2020    Atypical ductal hyperplasia of right breast 09/20/2018    PAOLA (obstructive sleep apnea) 03/02/2017    Advance directive discussed with patient     H/O colonoscopy 08/12/2014    Fibrocystic breast changes 01/28/2014    Vitamin D deficiency     Microcalcifications of the breast     Sickle cell trait ([facility] [address] 09/05/2013    Dyslipidemia 04/23/2013     Current Outpatient Medications   Medication Sig Dispense Refill    atorvastatin (LIPITOR) 40 mg tablet One tab daily, PO 90 Tab 3    raloxifene (EVISTA) 60 mg tablet Take 60 mg by mouth daily.  cholecalciferol, vitamin D3, (VITAMIN D3) 2,000 unit tab Take 4,000 Int'l Units by mouth daily.  omega-3 fatty acids-vitamin e (FISH OIL) 1,000 mg cap Take 2 Caps by mouth daily.  iron 50 mg iron Tab Take 325 mg by mouth daily. Take one po daily      acetaminophen (TYLENOL ARTHRITIS PAIN) 650 mg CR tablet Take 650 mg by mouth every six (6) hours as needed.  atorvastatin (LIPITOR) 10 mg tablet TAKE 1 TABLET BY MOUTH DAILY 90 Tab 1     Allergies   Allergen Reactions    Other Food Other (comments)     balsamic vinegar/vinegar: swelling in leg     Past Medical History:   Diagnosis Date    Advance directive discussed with patient 09/06/2016    Atypical ductal hyperplasia of right breast 2018    Being followed by Dr. Joby Toledo.     Dyslipidemia     H/O colonoscopy 08/12/14    Done by Dr. Grace Brown: diverticular dz & hemorrhoids, repeat in 10 years    Hyperlipemia     Hypertension     Microcalcifications of the breast 2013    left breast s/p Vacuum-assisted core needle biopsy of the left breast with placement of localizing clip  6/18/2013. Being followed by Dr. Wilmer Cruz. Suppose to have mammoram in 6 months. Path showed firbrocystic changes    PAOLA (obstructive sleep apnea) 03/02/2017    on CPAP machine. Being followed by Dr. Ita Waddell    Sickle cell trait ([facility] [address]     Vitamin D deficiency      Past Surgical History:   Procedure Laterality Date    HX BREAST BIOPSY Bilateral 1994    [facility]    HX BREAST LUMPECTOMY Right 10/2/2018    RIGHT BREAST NEEDLE LOC EXCISIONAL BIOPSY performed by Terri Marie MD at [address] [facility]    unsure of exact date [id number], partial hysterectomy. kept both ovaries    HX TONSIL AND ADENOIDECTOMY  1962    ANIBAL BIOPSY BREAST STEREOTACTIC Left 2013    benign    ME KNEE SCOPE,MED OR LAT MENIS REPAIR  2006    right knee     Family History   Problem Relation Age of Onset   [facility] Hypertension Mother     Diabetes Mother     Hypertension Father     Hypertension Maternal Grandmother     Hypertension Maternal Grandfather      Social History     Tobacco Use    Smoking status: Never Smoker    Smokeless tobacco: Never Used    Tobacco comment: pt counseled to continue to not smoke. Substance Use Topics    Alcohol use: Yes     Alcohol/week: 0.0 standard drinks     Comment: occ       ROS  Cardiac- no chest pain or palpitations  Pulmonary- no sob or wheezes  GI- no n/v or diarrhea. Objective:   No flowsheet data found. [INSTRUCTIONS:  \"[x]\" Indicates a positive item  \"[]\" Indicates a negative item  -- DELETE ALL ITEMS NOT EXAMINED]      We discussed the expected course, resolution and complications of the diagnosis(es) in detail. Medication risks, benefits, costs, interactions, and alternatives were discussed as indicated. I advised her to contact the office if her condition worsens, changes or fails to improve as anticipated.  She expressed understanding with the diagnosis(es) and plan. Mabel Putnam, who was evaluated through a patient-initiated, synchronous (real-time) audio only encounter, and/or her healthcare decision maker, is aware that it is a billable service, with coverage as determined by her insurance carrier. She provided verbal consent to proceed: Yes, and patient identification was verified. It was conducted pursuant to the emergency declaration under the [address] [place] [address] and the [employer] General Act. A caregiver was present when appropriate. Ability to conduct physical exam was limited. I was at home. The patient was at home.       Nataly Vee MD

## (undated) DEVICE — PETROLATUM GAUZE CISION DRESSING,OVERWRAP: Brand: VASELINE

## (undated) DEVICE — SUTURE MCRYL SZ 4-0 L18IN ABSRB UD L19MM PS-2 3/8 CIR PRIM Y496G

## (undated) DEVICE — SUTURE VCRL SZ 3-0 L18IN ABSRB UD POLYGLACTIN 910 BRAID TIE J910T

## (undated) DEVICE — DEPAUL MAJOR PROCEDURE PACK: Brand: MEDLINE INDUSTRIES, INC.

## (undated) DEVICE — TOWEL SURG W16XL26IN BLU NONFENESTRATED DLX ST 2 PER PK

## (undated) DEVICE — SOLUTION IV 1000ML 0.9% SOD CHL

## (undated) DEVICE — KENDALL SCD EXPRESS SLEEVES, KNEE LENGTH, MEDIUM: Brand: KENDALL SCD

## (undated) DEVICE — COVER LT HNDL BLU PLAS

## (undated) DEVICE — SUTURE VCRL SZ 2-0 L27IN ABSRB UD L26MM SH 1/2 CIR J417H

## (undated) DEVICE — SUTURE PROL SZ 4-0 L18IN NONABSORBABLE BLU L13MM PC-1 3/8 8619G

## (undated) DEVICE — STERILE POLYISOPRENE POWDER-FREE SURGICAL GLOVES: Brand: PROTEXIS

## (undated) DEVICE — (D)PREP SKN CHLRAPRP APPL 26ML -- CONVERT TO ITEM 371833

## (undated) DEVICE — SUTURE VCRL SZ 3-0 L27IN ABSRB UD L26MM SH 1/2 CIR J416H

## (undated) DEVICE — SUT SLK 2-0SH 30IN BLK --

## (undated) DEVICE — TELFA NON-ADHERENT PADS PREPAK: Brand: TELFA

## (undated) DEVICE — SPONGE: SPECIALTY K-DISS XR  100/CS: Brand: MEDICAL ACTION INDUSTRIES

## (undated) DEVICE — NEEDLE HYPO 30GA L0.5IN BGE POLYPR HUB S STL REG BVL STR